# Patient Record
Sex: FEMALE | Race: WHITE | NOT HISPANIC OR LATINO | Employment: OTHER | URBAN - METROPOLITAN AREA
[De-identification: names, ages, dates, MRNs, and addresses within clinical notes are randomized per-mention and may not be internally consistent; named-entity substitution may affect disease eponyms.]

---

## 2017-01-20 ENCOUNTER — RECORDS - HEALTHEAST (OUTPATIENT)
Dept: GENERAL RADIOLOGY | Facility: CLINIC | Age: 57
End: 2017-01-20

## 2017-01-20 ENCOUNTER — OFFICE VISIT - HEALTHEAST (OUTPATIENT)
Dept: FAMILY MEDICINE | Facility: CLINIC | Age: 57
End: 2017-01-20

## 2017-01-20 DIAGNOSIS — R07.89 OTHER CHEST PAIN: ICD-10-CM
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
## 2017-01-20 DIAGNOSIS — R07.89 CHEST WALL PAIN: ICD-10-CM

## 2017-01-20 ASSESSMENT — MIFFLIN-ST. JEOR: SCORE: 1668.12

## 2017-01-21 ENCOUNTER — COMMUNICATION - HEALTHEAST (OUTPATIENT)
Dept: SCHEDULING | Facility: CLINIC | Age: 57
End: 2017-01-21

## 2017-06-12 ENCOUNTER — OFFICE VISIT - HEALTHEAST (OUTPATIENT)
Dept: FAMILY MEDICINE | Facility: CLINIC | Age: 57
End: 2017-06-12

## 2017-06-12 DIAGNOSIS — G47.33 OBSTRUCTIVE SLEEP APNEA (ADULT) (PEDIATRIC): ICD-10-CM

## 2017-06-12 DIAGNOSIS — E78.2 MIXED HYPERLIPIDEMIA: ICD-10-CM

## 2017-06-12 DIAGNOSIS — Z00.00 ROUTINE GENERAL MEDICAL EXAMINATION AT A HEALTH CARE FACILITY: ICD-10-CM

## 2017-06-12 DIAGNOSIS — M06.9 RHEUMATOID ARTHRITIS INVOLVING MULTIPLE SITES, UNSPECIFIED RHEUMATOID FACTOR PRESENCE: ICD-10-CM

## 2017-06-12 DIAGNOSIS — E66.9 OBESITY: ICD-10-CM

## 2017-06-12 DIAGNOSIS — I10 ESSENTIAL HYPERTENSION WITH GOAL BLOOD PRESSURE LESS THAN 140/90: ICD-10-CM

## 2017-06-12 DIAGNOSIS — F17.200 SMOKING: ICD-10-CM

## 2017-06-12 LAB
CHOLEST SERPL-MCNC: 217 MG/DL
FASTING STATUS PATIENT QL REPORTED: YES
HDLC SERPL-MCNC: 34 MG/DL
LDLC SERPL CALC-MCNC: 144 MG/DL
TRIGL SERPL-MCNC: 193 MG/DL

## 2017-06-12 ASSESSMENT — MIFFLIN-ST. JEOR: SCORE: 1624.35

## 2017-08-02 ENCOUNTER — OFFICE VISIT - HEALTHEAST (OUTPATIENT)
Dept: RHEUMATOLOGY | Facility: CLINIC | Age: 57
End: 2017-08-02

## 2017-08-02 ENCOUNTER — RECORDS - HEALTHEAST (OUTPATIENT)
Dept: GENERAL RADIOLOGY | Age: 57
End: 2017-08-02

## 2017-08-02 DIAGNOSIS — M19.049 CMC DJD(CARPOMETACARPAL DEGENERATIVE JOINT DISEASE), LOCALIZED PRIMARY, UNSPECIFIED LATERALITY: ICD-10-CM

## 2017-08-02 DIAGNOSIS — M15.0 PRIMARY GENERALIZED (OSTEO)ARTHRITIS: ICD-10-CM

## 2017-08-02 DIAGNOSIS — M25.50 POLYARTHRALGIA: ICD-10-CM

## 2017-08-02 DIAGNOSIS — M25.50 PAIN IN UNSPECIFIED JOINT: ICD-10-CM

## 2017-08-02 DIAGNOSIS — M15.0 PRIMARY OSTEOARTHRITIS INVOLVING MULTIPLE JOINTS: ICD-10-CM

## 2017-08-02 ASSESSMENT — MIFFLIN-ST. JEOR: SCORE: 1615.73

## 2017-09-13 ENCOUNTER — COMMUNICATION - HEALTHEAST (OUTPATIENT)
Dept: FAMILY MEDICINE | Facility: CLINIC | Age: 57
End: 2017-09-13

## 2017-09-13 DIAGNOSIS — Z91.09 OTHER ALLERGY, OTHER THAN TO MEDICINAL AGENTS: ICD-10-CM

## 2017-11-01 ENCOUNTER — OFFICE VISIT - HEALTHEAST (OUTPATIENT)
Dept: PODIATRY | Age: 57
End: 2017-11-01

## 2017-11-01 DIAGNOSIS — L60.0 INGROWN TOENAIL: ICD-10-CM

## 2017-11-01 ASSESSMENT — MIFFLIN-ST. JEOR: SCORE: 1614.82

## 2017-11-06 ENCOUNTER — OFFICE VISIT - HEALTHEAST (OUTPATIENT)
Dept: RHEUMATOLOGY | Facility: CLINIC | Age: 57
End: 2017-11-06

## 2017-11-06 DIAGNOSIS — M25.50 POLYARTHRALGIA: ICD-10-CM

## 2017-11-06 DIAGNOSIS — M47.816 LUMBAR SPONDYLOSIS: ICD-10-CM

## 2017-11-06 DIAGNOSIS — M19.032 LOCALIZED PRIMARY OSTEOARTHROSIS OF CARPOMETACARPAL JOINT OF LEFT WRIST: ICD-10-CM

## 2017-11-06 DIAGNOSIS — M15.0 PRIMARY OSTEOARTHRITIS INVOLVING MULTIPLE JOINTS: ICD-10-CM

## 2017-11-06 ASSESSMENT — MIFFLIN-ST. JEOR: SCORE: 1614.82

## 2018-01-08 ENCOUNTER — OFFICE VISIT - HEALTHEAST (OUTPATIENT)
Dept: FAMILY MEDICINE | Facility: CLINIC | Age: 58
End: 2018-01-08

## 2018-01-08 DIAGNOSIS — J01.00 ACUTE NON-RECURRENT MAXILLARY SINUSITIS: ICD-10-CM

## 2018-01-08 ASSESSMENT — MIFFLIN-ST. JEOR: SCORE: 1637.96

## 2018-06-12 ENCOUNTER — COMMUNICATION - HEALTHEAST (OUTPATIENT)
Dept: FAMILY MEDICINE | Facility: CLINIC | Age: 58
End: 2018-06-12

## 2018-06-14 ENCOUNTER — OFFICE VISIT - HEALTHEAST (OUTPATIENT)
Dept: FAMILY MEDICINE | Facility: CLINIC | Age: 58
End: 2018-06-14

## 2018-06-14 ENCOUNTER — COMMUNICATION - HEALTHEAST (OUTPATIENT)
Dept: FAMILY MEDICINE | Facility: CLINIC | Age: 58
End: 2018-06-14

## 2018-06-14 DIAGNOSIS — I10 ESSENTIAL HYPERTENSION: ICD-10-CM

## 2018-06-14 DIAGNOSIS — Z79.899 MEDICATION MANAGEMENT: ICD-10-CM

## 2018-06-14 DIAGNOSIS — F33.0 MILD EPISODE OF RECURRENT MAJOR DEPRESSIVE DISORDER (H): ICD-10-CM

## 2018-06-14 DIAGNOSIS — G47.33 OBSTRUCTIVE SLEEP APNEA: ICD-10-CM

## 2018-06-14 DIAGNOSIS — F17.200 SMOKING: ICD-10-CM

## 2018-06-14 DIAGNOSIS — E66.01 MORBID OBESITY (H): ICD-10-CM

## 2018-06-14 DIAGNOSIS — Z00.00 ROUTINE GENERAL MEDICAL EXAMINATION AT A HEALTH CARE FACILITY: ICD-10-CM

## 2018-06-14 DIAGNOSIS — E78.2 MIXED HYPERLIPIDEMIA: ICD-10-CM

## 2018-06-14 LAB
ANION GAP SERPL CALCULATED.3IONS-SCNC: 12 MMOL/L (ref 5–18)
BUN SERPL-MCNC: 12 MG/DL (ref 8–22)
CALCIUM SERPL-MCNC: 9.6 MG/DL (ref 8.5–10.5)
CHLORIDE BLD-SCNC: 109 MMOL/L (ref 98–107)
CHOLEST SERPL-MCNC: 247 MG/DL
CO2 SERPL-SCNC: 20 MMOL/L (ref 22–31)
CREAT SERPL-MCNC: 0.74 MG/DL (ref 0.6–1.1)
FASTING STATUS PATIENT QL REPORTED: YES
GFR SERPL CREATININE-BSD FRML MDRD: >60 ML/MIN/1.73M2
GLUCOSE BLD-MCNC: 107 MG/DL (ref 70–125)
HDLC SERPL-MCNC: 36 MG/DL
HGB BLD-MCNC: 15.6 G/DL (ref 12–16)
LDLC SERPL CALC-MCNC: 157 MG/DL
POTASSIUM BLD-SCNC: 4.4 MMOL/L (ref 3.5–5)
SODIUM SERPL-SCNC: 141 MMOL/L (ref 136–145)
TRIGL SERPL-MCNC: 271 MG/DL

## 2018-06-14 ASSESSMENT — MIFFLIN-ST. JEOR: SCORE: 1666.53

## 2018-06-19 ENCOUNTER — HOSPITAL ENCOUNTER (OUTPATIENT)
Dept: MAMMOGRAPHY | Facility: CLINIC | Age: 58
Discharge: HOME | End: 2018-06-19
Attending: NURSE PRACTITIONER

## 2018-06-19 DIAGNOSIS — Z12.31 VISIT FOR SCREENING MAMMOGRAM: ICD-10-CM

## 2018-06-22 ENCOUNTER — COMMUNICATION - HEALTHEAST (OUTPATIENT)
Dept: FAMILY MEDICINE | Facility: CLINIC | Age: 58
End: 2018-06-22

## 2018-07-11 ENCOUNTER — RECORDS - HEALTHEAST (OUTPATIENT)
Dept: ADMINISTRATIVE | Facility: OTHER | Age: 58
End: 2018-07-11

## 2018-07-17 ENCOUNTER — OFFICE VISIT - HEALTHEAST (OUTPATIENT)
Dept: FAMILY MEDICINE | Facility: CLINIC | Age: 58
End: 2018-07-17

## 2018-07-17 DIAGNOSIS — F33.0 MILD EPISODE OF RECURRENT MAJOR DEPRESSIVE DISORDER (H): ICD-10-CM

## 2018-07-17 ASSESSMENT — MIFFLIN-ST. JEOR: SCORE: 1640.22

## 2018-07-20 ENCOUNTER — RECORDS - HEALTHEAST (OUTPATIENT)
Dept: ADMINISTRATIVE | Facility: OTHER | Age: 58
End: 2018-07-20

## 2018-08-01 ENCOUNTER — RECORDS - HEALTHEAST (OUTPATIENT)
Dept: ADMINISTRATIVE | Facility: OTHER | Age: 58
End: 2018-08-01

## 2018-08-15 ENCOUNTER — COMMUNICATION - HEALTHEAST (OUTPATIENT)
Dept: FAMILY MEDICINE | Facility: CLINIC | Age: 58
End: 2018-08-15

## 2018-08-15 ENCOUNTER — RECORDS - HEALTHEAST (OUTPATIENT)
Dept: ADMINISTRATIVE | Facility: OTHER | Age: 58
End: 2018-08-15

## 2018-08-21 ENCOUNTER — RECORDS - HEALTHEAST (OUTPATIENT)
Dept: ADMINISTRATIVE | Facility: OTHER | Age: 58
End: 2018-08-21

## 2018-08-28 ENCOUNTER — COMMUNICATION - HEALTHEAST (OUTPATIENT)
Dept: FAMILY MEDICINE | Facility: CLINIC | Age: 58
End: 2018-08-28

## 2018-08-28 ENCOUNTER — AMBULATORY - HEALTHEAST (OUTPATIENT)
Dept: NURSING | Facility: CLINIC | Age: 58
End: 2018-08-28

## 2018-08-28 DIAGNOSIS — Z00.00 ROUTINE GENERAL MEDICAL EXAMINATION AT A HEALTH CARE FACILITY: ICD-10-CM

## 2019-02-13 ENCOUNTER — OFFICE VISIT - HEALTHEAST (OUTPATIENT)
Dept: FAMILY MEDICINE | Facility: CLINIC | Age: 59
End: 2019-02-13

## 2019-02-13 DIAGNOSIS — B86 SCABIES: ICD-10-CM

## 2019-02-13 DIAGNOSIS — R21 RASH AND NONSPECIFIC SKIN ERUPTION: ICD-10-CM

## 2019-02-22 ENCOUNTER — COMMUNICATION - HEALTHEAST (OUTPATIENT)
Dept: FAMILY MEDICINE | Facility: CLINIC | Age: 59
End: 2019-02-22

## 2019-06-21 ENCOUNTER — HOSPITAL ENCOUNTER (OUTPATIENT)
Dept: MAMMOGRAPHY | Facility: CLINIC | Age: 59
Discharge: HOME | End: 2019-06-21
Attending: NURSE PRACTITIONER

## 2019-06-21 DIAGNOSIS — Z12.31 VISIT FOR SCREENING MAMMOGRAM: ICD-10-CM

## 2019-07-11 ENCOUNTER — RECORDS - HEALTHEAST (OUTPATIENT)
Dept: GENERAL RADIOLOGY | Facility: CLINIC | Age: 59
End: 2019-07-11

## 2019-07-11 ENCOUNTER — COMMUNICATION - HEALTHEAST (OUTPATIENT)
Dept: FAMILY MEDICINE | Facility: CLINIC | Age: 59
End: 2019-07-11

## 2019-07-11 ENCOUNTER — AMBULATORY - HEALTHEAST (OUTPATIENT)
Dept: FAMILY MEDICINE | Facility: CLINIC | Age: 59
End: 2019-07-11

## 2019-07-11 ENCOUNTER — OFFICE VISIT - HEALTHEAST (OUTPATIENT)
Dept: FAMILY MEDICINE | Facility: CLINIC | Age: 59
End: 2019-07-11

## 2019-07-11 DIAGNOSIS — L02.92 BOIL: ICD-10-CM

## 2019-07-11 DIAGNOSIS — M79.675 PAIN OF TOE OF LEFT FOOT: ICD-10-CM

## 2019-07-11 DIAGNOSIS — F33.0 MILD EPISODE OF RECURRENT MAJOR DEPRESSIVE DISORDER (H): ICD-10-CM

## 2019-07-11 DIAGNOSIS — Z00.01 ENCOUNTER FOR GENERAL ADULT MEDICAL EXAMINATION WITH ABNORMAL FINDINGS: ICD-10-CM

## 2019-07-11 DIAGNOSIS — R05.9 COUGH: ICD-10-CM

## 2019-07-11 DIAGNOSIS — E78.2 MIXED HYPERLIPIDEMIA: ICD-10-CM

## 2019-07-11 DIAGNOSIS — E66.01 MORBID OBESITY (H): ICD-10-CM

## 2019-07-11 DIAGNOSIS — J20.9 ACUTE BRONCHITIS, UNSPECIFIED ORGANISM: ICD-10-CM

## 2019-07-11 DIAGNOSIS — R60.9 EDEMA, UNSPECIFIED TYPE: ICD-10-CM

## 2019-07-11 DIAGNOSIS — I10 HYPERTENSION: ICD-10-CM

## 2019-07-11 DIAGNOSIS — M47.816 LUMBAR SPONDYLOSIS: ICD-10-CM

## 2019-07-11 DIAGNOSIS — Z79.899 MEDICATION MANAGEMENT: ICD-10-CM

## 2019-07-11 LAB
ALBUMIN SERPL-MCNC: 3.9 G/DL (ref 3.5–5)
ALP SERPL-CCNC: 71 U/L (ref 45–120)
ALT SERPL W P-5'-P-CCNC: 23 U/L (ref 0–45)
ANION GAP SERPL CALCULATED.3IONS-SCNC: 7 MMOL/L (ref 5–18)
AST SERPL W P-5'-P-CCNC: 14 U/L (ref 0–40)
BILIRUB SERPL-MCNC: 0.3 MG/DL (ref 0–1)
BUN SERPL-MCNC: 9 MG/DL (ref 8–22)
CALCIUM SERPL-MCNC: 9.6 MG/DL (ref 8.5–10.5)
CHLORIDE BLD-SCNC: 107 MMOL/L (ref 98–107)
CHOLEST SERPL-MCNC: 207 MG/DL
CO2 SERPL-SCNC: 28 MMOL/L (ref 22–31)
CREAT SERPL-MCNC: 0.77 MG/DL (ref 0.6–1.1)
FASTING STATUS PATIENT QL REPORTED: YES
GFR SERPL CREATININE-BSD FRML MDRD: >60 ML/MIN/1.73M2
GLUCOSE BLD-MCNC: 88 MG/DL (ref 70–125)
HDLC SERPL-MCNC: 36 MG/DL
HGB BLD-MCNC: 15.6 G/DL (ref 12–16)
LDLC SERPL CALC-MCNC: 123 MG/DL
POTASSIUM BLD-SCNC: 4.3 MMOL/L (ref 3.5–5)
PROT SERPL-MCNC: 6.5 G/DL (ref 6–8)
SODIUM SERPL-SCNC: 142 MMOL/L (ref 136–145)
TRIGL SERPL-MCNC: 241 MG/DL
URATE SERPL-MCNC: 5.2 MG/DL (ref 2–7.5)

## 2019-07-11 ASSESSMENT — MIFFLIN-ST. JEOR: SCORE: 1625.59

## 2019-07-12 ENCOUNTER — COMMUNICATION - HEALTHEAST (OUTPATIENT)
Dept: FAMILY MEDICINE | Facility: CLINIC | Age: 59
End: 2019-07-12

## 2019-07-25 ENCOUNTER — AMBULATORY - HEALTHEAST (OUTPATIENT)
Dept: FAMILY MEDICINE | Facility: CLINIC | Age: 59
End: 2019-07-25

## 2019-07-25 ENCOUNTER — COMMUNICATION - HEALTHEAST (OUTPATIENT)
Dept: VASCULAR SURGERY | Facility: CLINIC | Age: 59
End: 2019-07-25

## 2019-07-25 DIAGNOSIS — R93.89 ABNORMAL CHEST X-RAY: ICD-10-CM

## 2019-07-26 ENCOUNTER — COMMUNICATION - HEALTHEAST (OUTPATIENT)
Dept: FAMILY MEDICINE | Facility: CLINIC | Age: 59
End: 2019-07-26

## 2019-08-05 ENCOUNTER — OFFICE VISIT - HEALTHEAST (OUTPATIENT)
Dept: FAMILY MEDICINE | Facility: CLINIC | Age: 59
End: 2019-08-05

## 2019-08-05 DIAGNOSIS — I10 HYPERTENSION: ICD-10-CM

## 2019-08-05 DIAGNOSIS — F33.0 MILD EPISODE OF RECURRENT MAJOR DEPRESSIVE DISORDER (H): ICD-10-CM

## 2019-08-05 DIAGNOSIS — E78.2 MIXED HYPERLIPIDEMIA: ICD-10-CM

## 2019-08-05 DIAGNOSIS — B35.1 ONYCHOMYCOSIS: ICD-10-CM

## 2019-08-05 DIAGNOSIS — R60.9 EDEMA, UNSPECIFIED TYPE: ICD-10-CM

## 2019-08-05 LAB
ANION GAP SERPL CALCULATED.3IONS-SCNC: 10 MMOL/L (ref 5–18)
BUN SERPL-MCNC: 14 MG/DL (ref 8–22)
CALCIUM SERPL-MCNC: 10 MG/DL (ref 8.5–10.5)
CHLORIDE BLD-SCNC: 103 MMOL/L (ref 98–107)
CO2 SERPL-SCNC: 27 MMOL/L (ref 22–31)
CREAT SERPL-MCNC: 0.74 MG/DL (ref 0.6–1.1)
GFR SERPL CREATININE-BSD FRML MDRD: >60 ML/MIN/1.73M2
GLUCOSE BLD-MCNC: 110 MG/DL (ref 70–125)
POTASSIUM BLD-SCNC: 4.1 MMOL/L (ref 3.5–5)
SODIUM SERPL-SCNC: 140 MMOL/L (ref 136–145)

## 2019-08-05 ASSESSMENT — MIFFLIN-ST. JEOR: SCORE: 1621.05

## 2019-08-09 ENCOUNTER — HOSPITAL ENCOUNTER (OUTPATIENT)
Dept: CT IMAGING | Facility: HOSPITAL | Age: 59
Discharge: HOME | End: 2019-08-09
Attending: NURSE PRACTITIONER

## 2019-08-09 DIAGNOSIS — R93.89 ABNORMAL CHEST X-RAY: ICD-10-CM

## 2019-08-09 ASSESSMENT — MIFFLIN-ST. JEOR: SCORE: 1625.02

## 2019-08-13 ENCOUNTER — OFFICE VISIT - HEALTHEAST (OUTPATIENT)
Dept: PODIATRY | Facility: CLINIC | Age: 59
End: 2019-08-13

## 2019-08-13 DIAGNOSIS — L60.0 INGROWN TOENAIL: ICD-10-CM

## 2019-08-13 DIAGNOSIS — L60.2 ONYCHAUXIS: ICD-10-CM

## 2019-08-13 DIAGNOSIS — B35.1 NAIL FUNGUS: ICD-10-CM

## 2019-08-13 ASSESSMENT — MIFFLIN-ST. JEOR: SCORE: 1625.02

## 2019-08-15 ENCOUNTER — COMMUNICATION - HEALTHEAST (OUTPATIENT)
Dept: FAMILY MEDICINE | Facility: CLINIC | Age: 59
End: 2019-08-15

## 2019-09-25 ENCOUNTER — COMMUNICATION - HEALTHEAST (OUTPATIENT)
Dept: FAMILY MEDICINE | Facility: CLINIC | Age: 59
End: 2019-09-25

## 2019-09-25 DIAGNOSIS — J20.9 ACUTE BRONCHITIS, UNSPECIFIED ORGANISM: ICD-10-CM

## 2019-09-30 ENCOUNTER — OFFICE VISIT - HEALTHEAST (OUTPATIENT)
Dept: VASCULAR SURGERY | Facility: CLINIC | Age: 59
End: 2019-09-30

## 2019-09-30 DIAGNOSIS — I87.2 VENOUS INSUFFICIENCY OF BOTH LOWER EXTREMITIES: ICD-10-CM

## 2019-09-30 DIAGNOSIS — I73.9 PAD (PERIPHERAL ARTERY DISEASE) (H): ICD-10-CM

## 2019-09-30 DIAGNOSIS — I89.0 LYMPHEDEMA OF BOTH LOWER EXTREMITIES: ICD-10-CM

## 2019-09-30 ASSESSMENT — MIFFLIN-ST. JEOR: SCORE: 1625.02

## 2019-10-02 ENCOUNTER — COMMUNICATION - HEALTHEAST (OUTPATIENT)
Dept: FAMILY MEDICINE | Facility: CLINIC | Age: 59
End: 2019-10-02

## 2019-10-02 DIAGNOSIS — I10 HYPERTENSION: ICD-10-CM

## 2019-10-02 DIAGNOSIS — E78.2 MIXED HYPERLIPIDEMIA: ICD-10-CM

## 2019-10-04 ENCOUNTER — OFFICE VISIT - HEALTHEAST (OUTPATIENT)
Dept: FAMILY MEDICINE | Facility: CLINIC | Age: 59
End: 2019-10-04

## 2019-10-04 DIAGNOSIS — E78.2 MIXED HYPERLIPIDEMIA: ICD-10-CM

## 2019-10-04 DIAGNOSIS — R60.9 EDEMA, UNSPECIFIED TYPE: ICD-10-CM

## 2019-10-04 DIAGNOSIS — F33.0 MILD EPISODE OF RECURRENT MAJOR DEPRESSIVE DISORDER (H): ICD-10-CM

## 2019-10-04 DIAGNOSIS — I10 ESSENTIAL HYPERTENSION: ICD-10-CM

## 2019-10-04 ASSESSMENT — MIFFLIN-ST. JEOR: SCORE: 1655.87

## 2019-10-04 ASSESSMENT — PATIENT HEALTH QUESTIONNAIRE - PHQ9: SUM OF ALL RESPONSES TO PHQ QUESTIONS 1-9: 3

## 2019-10-07 ENCOUNTER — RECORDS - HEALTHEAST (OUTPATIENT)
Dept: VASCULAR ULTRASOUND | Facility: CLINIC | Age: 59
End: 2019-10-07

## 2019-10-07 ENCOUNTER — OFFICE VISIT - HEALTHEAST (OUTPATIENT)
Dept: VASCULAR SURGERY | Facility: CLINIC | Age: 59
End: 2019-10-07

## 2019-10-07 DIAGNOSIS — I87.2 VENOUS INSUFFICIENCY OF BOTH LOWER EXTREMITIES: ICD-10-CM

## 2019-10-07 DIAGNOSIS — I73.9 PAD (PERIPHERAL ARTERY DISEASE) (H): ICD-10-CM

## 2019-10-07 DIAGNOSIS — I89.0 LYMPHEDEMA, NOT ELSEWHERE CLASSIFIED: ICD-10-CM

## 2019-10-07 DIAGNOSIS — I87.2 VENOUS INSUFFICIENCY (CHRONIC) (PERIPHERAL): ICD-10-CM

## 2019-10-07 DIAGNOSIS — I73.9 PERIPHERAL VASCULAR DISEASE, UNSPECIFIED (H): ICD-10-CM

## 2019-10-07 DIAGNOSIS — I89.0 LYMPHEDEMA OF BOTH LOWER EXTREMITIES: ICD-10-CM

## 2019-10-07 ASSESSMENT — MIFFLIN-ST. JEOR: SCORE: 1652.24

## 2019-10-08 ENCOUNTER — AMBULATORY - HEALTHEAST (OUTPATIENT)
Dept: LAB | Facility: CLINIC | Age: 59
End: 2019-10-08

## 2019-10-08 DIAGNOSIS — E78.2 MIXED HYPERLIPIDEMIA: ICD-10-CM

## 2019-10-08 LAB
CHOLEST SERPL-MCNC: 135 MG/DL
FASTING STATUS PATIENT QL REPORTED: YES
HDLC SERPL-MCNC: 37 MG/DL
LDLC SERPL CALC-MCNC: 55 MG/DL
TRIGL SERPL-MCNC: 214 MG/DL

## 2019-10-10 ENCOUNTER — AMBULATORY - HEALTHEAST (OUTPATIENT)
Dept: FAMILY MEDICINE | Facility: CLINIC | Age: 59
End: 2019-10-10

## 2019-10-10 DIAGNOSIS — E78.2 MIXED HYPERLIPIDEMIA: ICD-10-CM

## 2019-10-11 ENCOUNTER — COMMUNICATION - HEALTHEAST (OUTPATIENT)
Dept: FAMILY MEDICINE | Facility: CLINIC | Age: 59
End: 2019-10-11

## 2019-10-14 ENCOUNTER — COMMUNICATION - HEALTHEAST (OUTPATIENT)
Dept: VASCULAR SURGERY | Facility: CLINIC | Age: 59
End: 2019-10-14

## 2019-10-21 ENCOUNTER — COMMUNICATION - HEALTHEAST (OUTPATIENT)
Dept: VASCULAR SURGERY | Facility: CLINIC | Age: 59
End: 2019-10-21

## 2019-12-30 ENCOUNTER — COMMUNICATION - HEALTHEAST (OUTPATIENT)
Dept: FAMILY MEDICINE | Facility: CLINIC | Age: 59
End: 2019-12-30

## 2019-12-30 DIAGNOSIS — E78.2 MIXED HYPERLIPIDEMIA: ICD-10-CM

## 2020-01-13 ENCOUNTER — OFFICE VISIT - HEALTHEAST (OUTPATIENT)
Dept: VASCULAR SURGERY | Facility: CLINIC | Age: 60
End: 2020-01-13

## 2020-01-13 DIAGNOSIS — M79.89 SWELLING OF LIMB: ICD-10-CM

## 2020-01-13 ASSESSMENT — MIFFLIN-ST. JEOR: SCORE: 1652.24

## 2020-01-14 ENCOUNTER — OFFICE VISIT - HEALTHEAST (OUTPATIENT)
Dept: FAMILY MEDICINE | Facility: CLINIC | Age: 60
End: 2020-01-14

## 2020-01-14 DIAGNOSIS — F33.0 MILD EPISODE OF RECURRENT MAJOR DEPRESSIVE DISORDER (H): ICD-10-CM

## 2020-01-14 DIAGNOSIS — M47.816 LUMBAR SPONDYLOSIS: ICD-10-CM

## 2020-01-14 DIAGNOSIS — F17.200 SMOKING: ICD-10-CM

## 2020-01-14 DIAGNOSIS — I10 ESSENTIAL HYPERTENSION: ICD-10-CM

## 2020-01-14 DIAGNOSIS — E66.01 MORBID OBESITY (H): ICD-10-CM

## 2020-01-14 DIAGNOSIS — G47.33 OBSTRUCTIVE SLEEP APNEA: ICD-10-CM

## 2020-01-14 DIAGNOSIS — E78.2 MIXED HYPERLIPIDEMIA: ICD-10-CM

## 2020-01-14 LAB
CHOLEST SERPL-MCNC: 134 MG/DL
FASTING STATUS PATIENT QL REPORTED: YES
HDLC SERPL-MCNC: 35 MG/DL
LDLC SERPL CALC-MCNC: 58 MG/DL
TRIGL SERPL-MCNC: 204 MG/DL

## 2020-01-14 ASSESSMENT — MIFFLIN-ST. JEOR: SCORE: 1659.5

## 2020-01-14 ASSESSMENT — PATIENT HEALTH QUESTIONNAIRE - PHQ9: SUM OF ALL RESPONSES TO PHQ QUESTIONS 1-9: 4

## 2020-04-09 ENCOUNTER — COMMUNICATION - HEALTHEAST (OUTPATIENT)
Dept: SCHEDULING | Facility: CLINIC | Age: 60
End: 2020-04-09

## 2020-04-09 DIAGNOSIS — F33.0 MILD EPISODE OF RECURRENT MAJOR DEPRESSIVE DISORDER (H): ICD-10-CM

## 2020-06-25 ENCOUNTER — COMMUNICATION - HEALTHEAST (OUTPATIENT)
Dept: FAMILY MEDICINE | Facility: CLINIC | Age: 60
End: 2020-06-25

## 2020-06-25 DIAGNOSIS — M47.816 LUMBAR SPONDYLOSIS: ICD-10-CM

## 2020-06-26 ENCOUNTER — COMMUNICATION - HEALTHEAST (OUTPATIENT)
Dept: FAMILY MEDICINE | Facility: CLINIC | Age: 60
End: 2020-06-26

## 2020-06-26 DIAGNOSIS — F33.0 MILD EPISODE OF RECURRENT MAJOR DEPRESSIVE DISORDER (H): ICD-10-CM

## 2020-06-27 ENCOUNTER — COMMUNICATION - HEALTHEAST (OUTPATIENT)
Dept: FAMILY MEDICINE | Facility: CLINIC | Age: 60
End: 2020-06-27

## 2020-06-27 DIAGNOSIS — I10 HYPERTENSION: ICD-10-CM

## 2020-06-27 DIAGNOSIS — F33.0 MILD EPISODE OF RECURRENT MAJOR DEPRESSIVE DISORDER (H): ICD-10-CM

## 2020-07-10 ENCOUNTER — RECORDS - HEALTHEAST (OUTPATIENT)
Dept: ADMINISTRATIVE | Facility: OTHER | Age: 60
End: 2020-07-10

## 2020-07-10 LAB — OCCULT BLOOD (FIT)_EXT (HISTORICAL CONVERSION): NEGATIVE

## 2020-08-07 ENCOUNTER — COMMUNICATION - HEALTHEAST (OUTPATIENT)
Dept: FAMILY MEDICINE | Facility: CLINIC | Age: 60
End: 2020-08-07

## 2020-08-07 DIAGNOSIS — B35.1 ONYCHOMYCOSIS: ICD-10-CM

## 2020-08-25 ENCOUNTER — RECORDS - HEALTHEAST (OUTPATIENT)
Dept: HEALTH INFORMATION MANAGEMENT | Facility: CLINIC | Age: 60
End: 2020-08-25

## 2020-12-07 ENCOUNTER — COMMUNICATION - HEALTHEAST (OUTPATIENT)
Dept: PHARMACY | Facility: CLINIC | Age: 60
End: 2020-12-07

## 2020-12-07 DIAGNOSIS — I10 HYPERTENSION: ICD-10-CM

## 2020-12-08 ENCOUNTER — COMMUNICATION - HEALTHEAST (OUTPATIENT)
Dept: FAMILY MEDICINE | Facility: CLINIC | Age: 60
End: 2020-12-08

## 2020-12-08 DIAGNOSIS — I10 HYPERTENSION: ICD-10-CM

## 2020-12-14 ENCOUNTER — COMMUNICATION - HEALTHEAST (OUTPATIENT)
Dept: FAMILY MEDICINE | Facility: CLINIC | Age: 60
End: 2020-12-14

## 2021-01-08 ENCOUNTER — OFFICE VISIT - HEALTHEAST (OUTPATIENT)
Dept: FAMILY MEDICINE | Facility: CLINIC | Age: 61
End: 2021-01-08

## 2021-01-08 DIAGNOSIS — Z23 ENCOUNTER FOR IMMUNIZATION: ICD-10-CM

## 2021-01-08 DIAGNOSIS — Z79.899 MEDICATION MANAGEMENT: ICD-10-CM

## 2021-01-08 DIAGNOSIS — E66.01 MORBID OBESITY (H): ICD-10-CM

## 2021-01-08 DIAGNOSIS — Z00.00 ROUTINE GENERAL MEDICAL EXAMINATION AT A HEALTH CARE FACILITY: ICD-10-CM

## 2021-01-08 DIAGNOSIS — F33.0 MILD EPISODE OF RECURRENT MAJOR DEPRESSIVE DISORDER (H): ICD-10-CM

## 2021-01-08 DIAGNOSIS — G47.33 OBSTRUCTIVE SLEEP APNEA: ICD-10-CM

## 2021-01-08 DIAGNOSIS — I10 HYPERTENSION: ICD-10-CM

## 2021-01-08 DIAGNOSIS — I10 ESSENTIAL HYPERTENSION: ICD-10-CM

## 2021-01-08 DIAGNOSIS — E78.2 MIXED HYPERLIPIDEMIA: ICD-10-CM

## 2021-01-08 DIAGNOSIS — F17.200 SMOKING: ICD-10-CM

## 2021-01-08 LAB
ALBUMIN SERPL-MCNC: 4.2 G/DL (ref 3.5–5)
ALBUMIN UR-MCNC: NEGATIVE MG/DL
ALP SERPL-CCNC: 83 U/L (ref 45–120)
ALT SERPL W P-5'-P-CCNC: 35 U/L (ref 0–45)
ANION GAP SERPL CALCULATED.3IONS-SCNC: 12 MMOL/L (ref 5–18)
APPEARANCE UR: ABNORMAL
AST SERPL W P-5'-P-CCNC: 15 U/L (ref 0–40)
BACTERIA #/AREA URNS HPF: ABNORMAL HPF
BILIRUB SERPL-MCNC: 0.4 MG/DL (ref 0–1)
BILIRUB UR QL STRIP: NEGATIVE
BUN SERPL-MCNC: 13 MG/DL (ref 8–22)
CALCIUM SERPL-MCNC: 9.6 MG/DL (ref 8.5–10.5)
CHLORIDE BLD-SCNC: 103 MMOL/L (ref 98–107)
CHOLEST SERPL-MCNC: 166 MG/DL
CO2 SERPL-SCNC: 26 MMOL/L (ref 22–31)
COLOR UR AUTO: YELLOW
CREAT SERPL-MCNC: 0.76 MG/DL (ref 0.6–1.1)
ERYTHROCYTE [DISTWIDTH] IN BLOOD BY AUTOMATED COUNT: 11.9 % (ref 11–14.5)
FASTING STATUS PATIENT QL REPORTED: YES
GFR SERPL CREATININE-BSD FRML MDRD: >60 ML/MIN/1.73M2
GLUCOSE BLD-MCNC: 131 MG/DL (ref 70–125)
GLUCOSE UR STRIP-MCNC: NEGATIVE MG/DL
HCT VFR BLD AUTO: 49.3 % (ref 35–47)
HDLC SERPL-MCNC: 42 MG/DL
HGB BLD-MCNC: 16.4 G/DL (ref 12–16)
HGB UR QL STRIP: ABNORMAL
KETONES UR STRIP-MCNC: NEGATIVE MG/DL
LDLC SERPL CALC-MCNC: 86 MG/DL
LEUKOCYTE ESTERASE UR QL STRIP: NEGATIVE
MCH RBC QN AUTO: 32.4 PG (ref 27–34)
MCHC RBC AUTO-ENTMCNC: 33.4 G/DL (ref 32–36)
MCV RBC AUTO: 97 FL (ref 80–100)
MUCOUS THREADS #/AREA URNS LPF: ABNORMAL LPF
NITRATE UR QL: NEGATIVE
PH UR STRIP: 5.5 [PH] (ref 5–8)
PLATELET # BLD AUTO: 228 THOU/UL (ref 140–440)
PMV BLD AUTO: 7.8 FL (ref 7–10)
POTASSIUM BLD-SCNC: 4 MMOL/L (ref 3.5–5)
PROT SERPL-MCNC: 6.8 G/DL (ref 6–8)
RBC # BLD AUTO: 5.08 MILL/UL (ref 3.8–5.4)
RBC #/AREA URNS AUTO: ABNORMAL HPF
SODIUM SERPL-SCNC: 141 MMOL/L (ref 136–145)
SP GR UR STRIP: 1.02 (ref 1–1.03)
SQUAMOUS #/AREA URNS AUTO: ABNORMAL LPF
TRIGL SERPL-MCNC: 191 MG/DL
UROBILINOGEN UR STRIP-ACNC: ABNORMAL
WBC #/AREA URNS AUTO: ABNORMAL HPF
WBC: 7.4 THOU/UL (ref 4–11)

## 2021-01-08 RX ORDER — HYDROCHLOROTHIAZIDE 25 MG/1
25 TABLET ORAL DAILY
Qty: 90 TABLET | Refills: 2 | Status: SHIPPED | OUTPATIENT
Start: 2021-01-08 | End: 2021-11-22

## 2021-01-08 RX ORDER — FLUOXETINE 10 MG/1
10 CAPSULE ORAL DAILY
Qty: 90 CAPSULE | Refills: 2 | Status: SHIPPED | OUTPATIENT
Start: 2021-01-08 | End: 2021-11-22

## 2021-01-08 ASSESSMENT — PATIENT HEALTH QUESTIONNAIRE - PHQ9: SUM OF ALL RESPONSES TO PHQ QUESTIONS 1-9: 2

## 2021-01-08 ASSESSMENT — MIFFLIN-ST. JEOR: SCORE: 1700.7

## 2021-01-10 ENCOUNTER — AMBULATORY - HEALTHEAST (OUTPATIENT)
Dept: FAMILY MEDICINE | Facility: CLINIC | Age: 61
End: 2021-01-10

## 2021-01-10 DIAGNOSIS — D58.2 ELEVATED HEMOGLOBIN (H): ICD-10-CM

## 2021-01-10 DIAGNOSIS — E78.2 MIXED HYPERLIPIDEMIA: ICD-10-CM

## 2021-01-10 DIAGNOSIS — R73.01 IMPAIRED FASTING GLUCOSE: ICD-10-CM

## 2021-03-29 ENCOUNTER — COMMUNICATION - HEALTHEAST (OUTPATIENT)
Dept: FAMILY MEDICINE | Facility: CLINIC | Age: 61
End: 2021-03-29

## 2021-03-29 DIAGNOSIS — M47.816 LUMBAR SPONDYLOSIS: ICD-10-CM

## 2021-03-30 RX ORDER — IBUPROFEN 600 MG/1
600 TABLET, FILM COATED ORAL EVERY 6 HOURS PRN
Qty: 30 TABLET | Refills: 0 | Status: SHIPPED | OUTPATIENT
Start: 2021-03-30 | End: 2022-04-10

## 2021-04-04 ENCOUNTER — COMMUNICATION - HEALTHEAST (OUTPATIENT)
Dept: FAMILY MEDICINE | Facility: CLINIC | Age: 61
End: 2021-04-04

## 2021-04-04 DIAGNOSIS — E78.2 MIXED HYPERLIPIDEMIA: ICD-10-CM

## 2021-04-05 RX ORDER — ATORVASTATIN CALCIUM 40 MG/1
TABLET, FILM COATED ORAL
Qty: 90 TABLET | Refills: 3 | Status: SHIPPED | OUTPATIENT
Start: 2021-04-05 | End: 2021-11-22

## 2021-04-08 ENCOUNTER — HOSPITAL ENCOUNTER (OUTPATIENT)
Dept: MAMMOGRAPHY | Facility: CLINIC | Age: 61
Discharge: HOME | End: 2021-04-08
Attending: NURSE PRACTITIONER

## 2021-04-08 DIAGNOSIS — Z12.31 VISIT FOR SCREENING MAMMOGRAM: ICD-10-CM

## 2021-04-10 ENCOUNTER — AMBULATORY - HEALTHEAST (OUTPATIENT)
Dept: NURSING | Facility: CLINIC | Age: 61
End: 2021-04-10

## 2021-05-26 ASSESSMENT — PATIENT HEALTH QUESTIONNAIRE - PHQ9: SUM OF ALL RESPONSES TO PHQ QUESTIONS 1-9: 3

## 2021-05-27 ENCOUNTER — RECORDS - HEALTHEAST (OUTPATIENT)
Dept: ADMINISTRATIVE | Facility: CLINIC | Age: 61
End: 2021-05-27

## 2021-05-27 ASSESSMENT — PATIENT HEALTH QUESTIONNAIRE - PHQ9
SUM OF ALL RESPONSES TO PHQ QUESTIONS 1-9: 2
SUM OF ALL RESPONSES TO PHQ QUESTIONS 1-9: 4

## 2021-05-28 ENCOUNTER — RECORDS - HEALTHEAST (OUTPATIENT)
Dept: ADMINISTRATIVE | Facility: CLINIC | Age: 61
End: 2021-05-28

## 2021-05-30 ENCOUNTER — RECORDS - HEALTHEAST (OUTPATIENT)
Dept: ADMINISTRATIVE | Facility: CLINIC | Age: 61
End: 2021-05-30

## 2021-05-30 VITALS — HEIGHT: 65 IN | WEIGHT: 243 LBS | BODY MASS INDEX: 40.48 KG/M2

## 2021-05-30 NOTE — TELEPHONE ENCOUNTER
----- Message from Leonora Dolan CNP sent at 7/25/2019  9:52 PM CDT -----  Please notify the patient that her chest x-ray does not show pneumonia.  The radiologist did incidentally note a possible aortic aneurysm which is an abnormal bulge within the wall of the major blood vessel in her heart.  This generally does not have symptoms and needs to be monitored.  I placed an order for a chest CT to confirm whether this is actually present.  Thanks.

## 2021-05-30 NOTE — TELEPHONE ENCOUNTER
----- Message from Leonora Dolan CNP sent at 7/11/2019  8:07 PM CDT -----  Please notify the patient that her labs are normal except her cholesterol is elevated, especially her triglycerides.  I sent a prescription for her to restart the Atorvastatin 20 mg daily. I encourage her to consume a healthy diet, exercise, and avoid alcohol.  I recommend a lab recheck in 3 months.  Thanks.

## 2021-05-30 NOTE — TELEPHONE ENCOUNTER
Reason contacted:  Results   Information relayed:  Below message   Additional questions:  No  Further follow-up needed:  No  Okay to leave a detailed message:  No

## 2021-05-30 NOTE — PROGRESS NOTES
Assessment and Plan:       1. Encounter for general adult medical examination with abnormal findings  Discussed consuming a healthy diet and exercising.  She is up-to-date on vaccinations.    - Hemoglobin    2. Hypertension  This has been suboptimal without medication.  Due to lower extremity edema, will restart hydrochlorothiazide.  She will follow-up in 2 weeks for blood pressure and BMP recheck.   - hydroCHLOROthiazide (HYDRODIURIL) 25 MG tablet; Take 1 tablet (25 mg total) by mouth daily.  Dispense: 90 tablet; Refill: 0    3. Mixed hyperlipidemia  She stopped her statin on her own.  Will check lipid cascade.  I anticipate restarting this due to history of hyperlipidemia and smoking.   - Lipid Cascade    4. Mild episode of recurrent major depressive disorder (H)  She states this is controlled.  She continues Fluoxetine.     5. Morbid Obesity  The following high BMI interventions were performed this visit: encouragement to exercise and lifestyle education regarding diet    6. Medication management  - Comprehensive Metabolic Panel    7. Acute bronchitis, unspecified organism  Chest x-ray shows no acute infiltrate.  Will have radiology review.  Due to abnormal lung sounds with auscultation, will treat with Doxycyline. Educated on its indications and side effects. She is instructed to avoid sun exposure.  Also provided prescription for Ventolin to use as needed.   - doxycycline (VIBRA-TABS) 100 MG tablet; Take 1 tablet (100 mg total) by mouth 2 (two) times a day for 10 days.  Dispense: 20 tablet; Refill: 0  - albuterol (PROAIR HFA;PROVENTIL HFA;VENTOLIN HFA) 90 mcg/actuation inhaler; Inhale 2 puffs every 4 (four) hours as needed for wheezing.  Dispense: 1 each; Refill: 0    8. Cough  - XR Chest 2 Views; Future    9. Lumbar spondylosis  Patient will take Ibuprofen as needed for flares of back pain.  I encouraged her not to take this regularly.   - ibuprofen (ADVIL,MOTRIN) 600 MG tablet; Take 1 tablet (600 mg total)  by mouth every 6 (six) hours as needed for pain.  Dispense: 30 tablet; Refill: 0    10. Edema, unspecified type  Will start Hydrochlorothiazide.  Educated on its indications and side effects. Will also refer to vascular center for possible venous insufficiency or PVD.    - Ambulatory referral to Vascular Medicine    11. Pain of toe of left foot  Will rule out gout. She denies any known trauma.  If uric acid is abnormal, may consider referral to podiatry.    - Uric Acid    12.  Boil  Patient's lump within her right axilla appears to be a boil.  Lymphadenopathy is another differential.  Discussed applying warm compresses.  Will treat with Doxycycline.  She is to follow-up if symptoms persist or worsen.       The patient's current medical problems were reviewed.    The following health maintenance schedule was reviewed with the patient and provided in printed form in the after visit summary:   Health Maintenance   Topic Date Due     ADVANCE DIRECTIVES DISCUSSED WITH PATIENT  06/05/2011     INFLUENZA VACCINE RULE BASED (1) 08/01/2019     DEPRESSION FOLLOW UP  01/11/2020     PAP SMEAR  06/06/2021     MAMMOGRAM  06/21/2021     COLONOSCOPY  09/13/2022     TD 18+ HE  06/06/2026     TDAP ADULT ONE TIME DOSE  Completed        Subjective:   Chief Complaint: Rochelle Garza is an 58 y.o. female here for an Annual Wellness visit.   HPI: Patient is single and is not sexually active.  She has no concerns for STDs.  Last Pap smear was on 6/6/2016 and was normal.  Patient has difficulty tolerating Pap smears.  Patient has multiple comorbidities including depression, obesity, NADIA, hypertension, hyperlipidemia, lumbar spondylosis.  Patient discontinued her hydrochlorothiazide as her blood pressure has been well controlled.  Over the past few months, though, she has been experiencing bilateral lower extremity swelling.  This is occurring daily for her.  She denies any recent travel.  She denies any personal family history of blood clots.   Patient has had intermittent toe pain throughout the winter.  It varies on which toe is being affected.  Over the past week, she has had a second and third toe pain.  She has noticed some bruising of her toes.  She denies any trauma. She denies calf pain. Patient has a history of hyperlipidemia.  She discontinued atorvastatin on her own.  Last cholesterol check was on 6/14/2018 with a total cholesterol 247, triglycerides 271, HDL 36, .  She is not consuming a healthy diet or exercising.    Patient is concerned of cold symptoms for 1 week.  She complains of rhinorrhea, postnasal drainage, occasionally productive cough with clear drainage.  She will occasionally vomit due to the cough.  She denies headache, stomachache, nausea, vomiting, fever, chest tightness, wheezing.  She has had some mild shortness of breath.  She has been using over-the-counter cough syrup and cough drops.  No one else around her is ill.    Patient has had a small sore present within her right axilla for 1 month.  It has not changed in size.  She has not noticed any drainage.  Patient states it is painful to touch.  She has a history of boils in the past.  No fever has been present.    She requests Ibuprofen to take as needed for chronic lumbar pain.      Review of Systems:  Please see above.  The rest of the review of systems are negative for all systems.    Patient Care Team:  Leonora Dolan CNP as PCP - General     Patient Active Problem List   Diagnosis     Osteoarthrosis     Rosacea     Insomnia     Edema     Allergies     Morbid Obesity     Major Depression, Recurrent     Obstructive sleep apnea     Hypertension     Diverticulitis Of Colon     Abnormal involuntary movement     Mixed hyperlipidemia     Polyarthralgia     Primary osteoarthritis involving multiple joints     CMC DJD(carpometacarpal degenerative joint disease), localized primary     Lumbar spondylosis     Past Medical History:   Diagnosis Date     Anxiety       Depression      Hyperlipidemia      Hypertension      Ingrown toenail      Onychomycosis      Painful swelling of joint      Rheumatoid arthritis (H)      Sleep apnea     Using CPAP machine      Past Surgical History:   Procedure Laterality Date     ANKLE SURGERY Right      HERNIA REPAIR       LUMBAR FUSION  2006    Done at [facility] APPENDECTOMY      Description: Appendectomy;  Recorded: 09/23/2008;     SPINAL FUSION        Family History   Problem Relation Age of Onset     Breast cancer Mother 49     Hypertension Father      Dementia Father      No Medical Problems Sister      No Medical Problems Daughter      No Medical Problems Maternal Grandmother      No Medical Problems Maternal Grandfather      Diabetes Paternal Grandmother      No Medical Problems Paternal Grandfather      No Medical Problems Maternal Aunt      No Medical Problems Paternal Aunt      BRCA 1/2 Neg Hx      Colon cancer Neg Hx      Endometrial cancer Neg Hx      Ovarian cancer Neg Hx       Social History     Socioeconomic History     Marital status: Single     Spouse name: Not on file     Number of children: Not on file     Years of education: Not on file     Highest education level: Not on file   Occupational History     Not on file   Social Needs     Financial resource strain: Not on file     Food insecurity:     Worry: Not on file     Inability: Not on file     Transportation needs:     Medical: Not on file     Non-medical: Not on file   Tobacco Use     Smoking status: Current Every Day Smoker     Packs/day: 0.50     Smokeless tobacco: Never Used   Substance and Sexual Activity     Alcohol use: No     Drug use: No     Sexual activity: Never   Lifestyle     Physical activity:     Days per week: Not on file     Minutes per session: Not on file     Stress: Not on file   Relationships     Social connections:     Talks on phone: Not on file     Gets together: Not on file     Attends Anglican service: Not on file     Active member of club  "or organization: Not on file     Attends meetings of clubs or organizations: Not on file     Relationship status: Not on file     Intimate partner violence:     Fear of current or ex partner: Not on file     Emotionally abused: Not on file     Physically abused: Not on file     Forced sexual activity: Not on file   Other Topics Concern     Not on file   Social History Narrative     Not on file      Current Outpatient Medications   Medication Sig Dispense Refill     FLUoxetine (PROZAC) 10 MG tablet Take 1 tablet (10 mg total) by mouth daily. 90 tablet 2     fluticasone (FLONASE) 50 mcg/actuation nasal spray SHAKE WELL AND USE 2 SPRAYS IN EACH NOSTRIL DAILY FOR 10 DAYS 48 g 3     albuterol (PROAIR HFA;PROVENTIL HFA;VENTOLIN HFA) 90 mcg/actuation inhaler Inhale 2 puffs every 4 (four) hours as needed for wheezing. 1 each 0     doxycycline (VIBRA-TABS) 100 MG tablet Take 1 tablet (100 mg total) by mouth 2 (two) times a day for 10 days. 20 tablet 0     hydroCHLOROthiazide (HYDRODIURIL) 25 MG tablet Take 1 tablet (25 mg total) by mouth daily. 90 tablet 0     ibuprofen (ADVIL,MOTRIN) 600 MG tablet Take 1 tablet (600 mg total) by mouth every 6 (six) hours as needed for pain. 30 tablet 0     No current facility-administered medications for this visit.       Objective:   Vital Signs:   Visit Vitals  /88   Pulse 82   Temp 98.3  F (36.8  C)   Ht 5' 3.75\" (1.619 m)   Wt (!) 238 lb (108 kg)   SpO2 98%   BMI 41.17 kg/m         VisionScreening:  No exam data present     PHYSICAL EXAM  /88   Pulse 82   Temp 98.3  F (36.8  C)   Ht 5' 3.75\" (1.619 m)   Wt (!) 238 lb (108 kg)   SpO2 98%   BMI 41.17 kg/m      General Appearance:    Alert, cooperative, no distress, appears stated age   Head:    Normocephalic, without obvious abnormality, atraumatic   Eyes:    PERRL, EOM's intact, both eyes   Ears:    Normal TM's and external ear canals, both ears   Nose:   Nares normal, septum midline, mucosa red, no drainage     or " sinus tenderness   Throat:   Lips, mucosa, and tongue normal; teeth and gums normal   Neck:   Supple, symmetrical, trachea midline, no adenopathy;     thyroid:  no enlargement/tenderness/nodules; no carotid    bruit or JVD   Back:     Symmetric, no curvature, ROM normal, no CVA tenderness   Lungs:     Slight expiratory rhonchi heard in right lower lung, respirations unlabored, no wheezing or rales noted.    Chest Wall:    No tenderness or deformity    Heart:    Regular rate and rhythm, S1 and S2 normal, no murmur, rub    or gallop   Breast Exam:    No tenderness, masses, or nipple abnormality   Abdomen:     Soft, non-tender, bowel sounds active all four quadrants,     no masses, no organomegaly   Genitalia:    deferred   Rectal:    deferred   Extremities:   Extremities normal, atraumatic, +1 nonpitting edema present in bilateral lower extremities.  The toes of both feet are warm to touch yet appear mildly cyanotic.  Pedal pulses present and palpable.  She is non-tender to palpation of the toes.    Pulses:   2+ and symmetric all extremities   Skin:   She has a 1/2 cm palpable lump present within the right axilla.  It is mildly erythematous and tender to touch.    Lymph nodes:   Cervical, supraclavicular, and axillary nodes normal   Neurologic:   CNII-XII intact, normal strength, sensation and reflexes     Throughout     I ordered and personally revtiewed a chest x-ray showing no obvious infiltrate.  Will have radiology review.        Assessment Results 7/11/2019   Activities of Daily Living No help needed   Instrumental Activities of Daily Living No help needed   Get Up and Go Score Less than 12 seconds   Mini Cog Total Score 5   Some recent data might be hidden     A Mini-Cog score of 0-2 suggests the possibility of dementia, score of 3-5 suggests no dementia    Identified Health Risks:     The patient was provided with suggestions to help her develop a healthy physical lifestyle.   She is at risk for lack of  exercise and has been provided with information to increase physical activity for the benefit of her well-being.  The patient was provided with written information regarding signs of hearing loss.  Information on urinary incontinence and treatment options given to patient.  Information regarding advance directives (living hui), including where she can download the appropriate form, was provided to the patient via the AVS.

## 2021-05-31 ENCOUNTER — RECORDS - HEALTHEAST (OUTPATIENT)
Dept: ADMINISTRATIVE | Facility: CLINIC | Age: 61
End: 2021-05-31

## 2021-05-31 VITALS — WEIGHT: 242.1 LBS | HEIGHT: 63 IN | BODY MASS INDEX: 42.89 KG/M2

## 2021-05-31 VITALS — HEIGHT: 63 IN | BODY MASS INDEX: 42.52 KG/M2 | WEIGHT: 240 LBS

## 2021-05-31 VITALS — BODY MASS INDEX: 43.43 KG/M2 | WEIGHT: 245.1 LBS | HEIGHT: 63 IN

## 2021-05-31 VITALS — HEIGHT: 63 IN | WEIGHT: 240.2 LBS | BODY MASS INDEX: 42.56 KG/M2

## 2021-05-31 NOTE — TELEPHONE ENCOUNTER
----- Message from Leonora Dolan CNP sent at 8/14/2019  5:17 PM CDT -----  Please notify the patient that her chest CT did not show an aneurysm.  It did show multiple small lung nodules.  They do not appear concerning at this time, but the radiologist recommends repeating the CT scan in one year. I encourage her to quit smoking.  Thanks.

## 2021-05-31 NOTE — PROGRESS NOTES
FOOT AND ANKLE SURGERY/PODIATRY Progress Note        ASSESSMENT:   Onychomycosis, onychauxis, onychocryptosis    HPI: Rochelle Garza was seen again today complaining of long thick painful nails both feet.  She stated that the second toenail left foot is quite painful.  She has a sharp pain which is aggravated by her shoe gear.  She does not recall any particular trauma to the toenail.  She has not had any associated redness or swelling.  The pain is moderate to severe.  There are no factors which give her any relief.    Past Medical History:   Diagnosis Date     Anxiety      Depression      Hyperlipidemia      Hypertension      Ingrown toenail      Onychomycosis      Painful swelling of joint      Rheumatoid arthritis (H)      Sleep apnea     Using CPAP machine       Past Surgical History:   Procedure Laterality Date     ANKLE SURGERY Right      HERNIA REPAIR       LUMBAR FUSION  2006    Done at [facility] APPENDECTOMY      Description: Appendectomy;  Recorded: 09/23/2008;     SPINAL FUSION         Allergies   Allergen Reactions     Penicillins Unknown     Sulfites          Current Outpatient Medications:      albuterol (PROAIR HFA;PROVENTIL HFA;VENTOLIN HFA) 90 mcg/actuation inhaler, INHALE 2 PUFF EVERY 4 HOURS AS NEEDED, Disp: 90 g, Rfl: 0     atorvastatin (LIPITOR) 20 MG tablet, Take 1 tablet (20 mg total) by mouth at bedtime., Disp: 90 tablet, Rfl: 0     ciclopirox (PENLAC) 8 % solution, Apply over nail and surrounding skin. Apply daily over previous coat. After seven (7) days, may remove with alcohol and continue cycle., Disp: 6.6 mL, Rfl: 3     FLUoxetine (PROZAC) 10 MG tablet, Take 1 tablet (10 mg total) by mouth daily., Disp: 90 tablet, Rfl: 2     fluticasone (FLONASE) 50 mcg/actuation nasal spray, SHAKE WELL AND USE 2 SPRAYS IN EACH NOSTRIL DAILY FOR 10 DAYS, Disp: 48 g, Rfl: 3     hydroCHLOROthiazide (HYDRODIURIL) 25 MG tablet, Take 1 tablet (25 mg total) by mouth daily., Disp: 90 tablet, Rfl: 2      ibuprofen (ADVIL,MOTRIN) 600 MG tablet, Take 1 tablet (600 mg total) by mouth every 6 (six) hours as needed for pain., Disp: 30 tablet, Rfl: 0    Family History   Problem Relation Age of Onset     Breast cancer Mother 49     Hypertension Father      Dementia Father      No Medical Problems Sister      No Medical Problems Daughter      No Medical Problems Maternal Grandmother      No Medical Problems Maternal Grandfather      Diabetes Paternal Grandmother      No Medical Problems Paternal Grandfather      No Medical Problems Maternal Aunt      No Medical Problems Paternal Aunt      BRCA 1/2 Neg Hx      Colon cancer Neg Hx      Endometrial cancer Neg Hx      Ovarian cancer Neg Hx        Social History     Socioeconomic History     Marital status: Single     Spouse name: Not on file     Number of children: Not on file     Years of education: Not on file     Highest education level: Not on file   Occupational History     Not on file   Social Needs     Financial resource strain: Not on file     Food insecurity:     Worry: Not on file     Inability: Not on file     Transportation needs:     Medical: Not on file     Non-medical: Not on file   Tobacco Use     Smoking status: Current Every Day Smoker     Packs/day: 0.50     Smokeless tobacco: Never Used   Substance and Sexual Activity     Alcohol use: No     Drug use: No     Sexual activity: Never   Lifestyle     Physical activity:     Days per week: Not on file     Minutes per session: Not on file     Stress: Not on file   Relationships     Social connections:     Talks on phone: Not on file     Gets together: Not on file     Attends Judaism service: Not on file     Active member of club or organization: Not on file     Attends meetings of clubs or organizations: Not on file     Relationship status: Not on file     Intimate partner violence:     Fear of current or ex partner: Not on file     Emotionally abused: Not on file     Physically abused: Not on file     Forced  sexual activity: Not on file   Other Topics Concern     Not on file   Social History Narrative     Not on file       10 point Review of Systems is negative        Vitals:    08/13/19 1012   BP: 120/80   Pulse: 72   Resp: 20   Temp: 98.8  F (37.1  C)       BMI= Body mass index is 40.68 kg/m .    OBJECTIVE:  General appearance: Patient is alert and fully cooperative with history & exam.  No sign of distress is noted during the visit.  Vascular: Dorsalis pedis and posterior tibial pulses are palpable. There is no pedal hair growth bilaterally.  CFT < 3 sec from anterior tibial surface to distal digits bilaterally. There is no appreciable edema noted.  Dermatologic: Nails on the left foot are elongated and 2-3 times normal thickness.  The lateral border of the second toenail is incurvated.  There is no edema, erythema surrounding any of the nails left foot.  Turgor and texture are within normal limits. No coloration or temperature changes. No primary or secondary lesions noted.  Neurologic: All epicritic and proprioceptive sensations are grossly intact bilaterally.  Musculoskeletal: All active and passive ankle, subtalar, midtarsal, and 1st MPJ range of motion are grossly intact without pain or crepitus, with the exception of none. Manual muscle strength is within normal limits bilaterally. All dorsiflexors, plantarflexors, invertors, evertors are intact bilaterally. Tenderness present to second toenail left foot on palpation.  No tenderness to left foot or ankle with range of motion. Calf is soft/non-tenderwithout warmth/induration    Imaging:     Ct Chest With Contrast    Result Date: 8/9/2019  EXAM: CT CHEST W CONTRAST LOCATION: [facility] DATE/TIME: 8/9/2019 11:05 AM INDICATION: Right parahilar enlargement. Aortic disease, nontraumatic abnormal chest x-ray showing possible ascending aortic aneurysm. COMPARISON: Chest radiograph 07/11/2019. TECHNIQUE: Helical images were obtained through the chest during  injection of IV contrast. Multiplanar reformats were obtained. Dose reduction techniques were used. CONTRAST: 90 ml omni 350. FINDINGS: LUNGS AND PLEURA: Right basilar atelectasis. Mild basilar bronchiectasis. Mosaicism which can be seen with small airways disease and/or gas trapping. 2 mm left apex nodule (series 5, image 26). 1 mm subpleural left upper lobe nodule (image 70). Negative pleural spaces. MEDIASTINUM: Nonaneurysmal aorta. Ascending aorta measures 3.8 cm. Patent aortic branch vessels. Borderline pulmonary trunk enlargement at 3 cm. No adenopathy. Small hiatus hernia. LIMITED UPPER ABDOMEN: Elevated right hemidiaphragm. 2 x 2.1 cm presumed left adrenal adenoma. MUSCULOSKELETAL: Negative.     CONCLUSION: 1.  Nonaneurysmal aorta. No dissection. 2.  No mediastinal or hilar adenopathy. 3.  Mild basilar predominant bronchiectasis. 4.  Couple tiny pulmonary nodules which may be incidental. Optional 12 month follow-up chest CT could be performed per guidelines below. REFERENCE: Guidelines for Management of Incidental Pulmonary Nodules Detected on CT Images: From the Fleischner Society 2017. Guidelines apply to incidental nodules in patients who are 35 years or older. Guidelines do not apply to lung cancer screening, patients with immunosuppression, or patients with known primary cancer. MULTIPLE NODULES Nodule size less than or equal to 6 mm Low-risk patients: No follow-up needed. High-risk patients: Optional follow-up at 12 months. Nodule size 6 mm or larger Low-risk patients: Follow-up CT at 3-6 months, then consider CT at 18-24 months. High-risk patients: Follow-up CT at 3-6 months, then at 18-24 months if no change. -Use most suspicious nodule as guide to management.            TREATMENT:  Debrided nails 1 through 5 left foot.  I informed the patient that this should give her some relief.  If she continues to have pain I would recommend total nail excision and matrixectomy of the second toenail left  foot.        Neftali Troy; NONA  [facility] Foot & Ankle Surgery/Podiatry

## 2021-05-31 NOTE — PROGRESS NOTES
Assessment and Plan:     1. Onychomycosis  We will treat with Penlac, use as directed.  Educated on its indications and side effects.  Will refer to podiatry due to discomfort within the toe.  - ciclopirox (PENLAC) 8 % solution; Apply over nail and surrounding skin. Apply daily over previous coat. After seven (7) days, may remove with alcohol and continue cycle.  Dispense: 6.6 mL; Refill: 3  - Ambulatory referral to Podiatry    2. Mild episode of recurrent major depressive disorder (H)  She continues fluoxetine 10 mg daily.  This is stable.  - FLUoxetine (PROZAC) 10 MG tablet; Take 1 tablet (10 mg total) by mouth daily.  Dispense: 90 tablet; Refill: 2    3. Hypertension  This is controlled with hydrochlorothiazide.  We will recheck BMP today.  - hydroCHLOROthiazide (HYDRODIURIL) 25 MG tablet; Take 1 tablet (25 mg total) by mouth daily.  Dispense: 90 tablet; Refill: 2  - Basic Metabolic Panel    4. Edema, unspecified type  She has found significant improvement with the edema.  She continues hydrochlorothiazide.    5. Mixed hyperlipidemia  She continues atorvastatin.  She will follow-up for lab only recheck in 3 months.  She is content with the plan.      Subjective:     Rochelle is a 59 y.o. female presenting to the clinic for for follow-up on edema.  Patient presented to the clinic for a physical on 7/11/2019 with concerns of lower extremity edema.  Blood pressure was suboptimal.  We restarted hydrochlorothiazide 25 mg daily.  Patient has been tolerating the medication well without any side effects.  She has not been checking her blood pressure at home.  The edema has significantly improved.  She denies chest pain, shortness of breath with exertion, orthopnea, syncope.  Patient has hyperlipidemia.  She recently restarted her atorvastatin 20 mg daily.  She is taking fluoxetine 10 mg daily for depression.  She feels as though this is well controlled.  She denies thoughts of suicide.  Patient has had thickened toenails for  multiple years.  She is now having discomfort within her left foot second toenail.  She has not tried any over-the-counter products for her symptoms.  Patient had a chest x-ray on 7/11/2019 showing stable prominence of the right hilum, possibly ascending aortic aneurysm.  Patient has a CT of the chest scheduled for Friday.    Review of Systems: A complete 14 point review of systems was obtained and is negative or as stated in the history of present illness.    Social History     Socioeconomic History     Marital status: Single     Spouse name: Not on file     Number of children: Not on file     Years of education: Not on file     Highest education level: Not on file   Occupational History     Not on file   Social Needs     Financial resource strain: Not on file     Food insecurity:     Worry: Not on file     Inability: Not on file     Transportation needs:     Medical: Not on file     Non-medical: Not on file   Tobacco Use     Smoking status: Current Every Day Smoker     Packs/day: 0.50     Smokeless tobacco: Never Used   Substance and Sexual Activity     Alcohol use: No     Drug use: No     Sexual activity: Never   Lifestyle     Physical activity:     Days per week: Not on file     Minutes per session: Not on file     Stress: Not on file   Relationships     Social connections:     Talks on phone: Not on file     Gets together: Not on file     Attends Zoroastrian service: Not on file     Active member of club or organization: Not on file     Attends meetings of clubs or organizations: Not on file     Relationship status: Not on file     Intimate partner violence:     Fear of current or ex partner: Not on file     Emotionally abused: Not on file     Physically abused: Not on file     Forced sexual activity: Not on file   Other Topics Concern     Not on file   Social History Narrative     Not on file       Active Ambulatory Problems     Diagnosis Date Noted     Osteoarthrosis      Rosacea      Insomnia      Edema       "Allergies      Morbid Obesity      Major Depression, Recurrent      Obstructive sleep apnea      Hypertension      Diverticulitis Of Colon      Abnormal involuntary movement      Mixed hyperlipidemia 06/12/2017     Polyarthralgia 08/02/2017     Primary osteoarthritis involving multiple joints 08/02/2017     CMC DJD(carpometacarpal degenerative joint disease), localized primary 08/02/2017     Lumbar spondylosis 11/06/2017     Resolved Ambulatory Problems     Diagnosis Date Noted     Dyspepsia      Hyperlipidemia      Rheumatoid arthritis (H) 06/06/2016     Past Medical History:   Diagnosis Date     Anxiety      Depression      Hyperlipidemia      Hypertension      Ingrown toenail      Onychomycosis      Painful swelling of joint      Rheumatoid arthritis (H)      Sleep apnea        Family History   Problem Relation Age of Onset     Breast cancer Mother 49     Hypertension Father      Dementia Father      No Medical Problems Sister      No Medical Problems Daughter      No Medical Problems Maternal Grandmother      No Medical Problems Maternal Grandfather      Diabetes Paternal Grandmother      No Medical Problems Paternal Grandfather      No Medical Problems Maternal Aunt      No Medical Problems Paternal Aunt      BRCA 1/2 Neg Hx      Colon cancer Neg Hx      Endometrial cancer Neg Hx      Ovarian cancer Neg Hx        Objective:     /80   Pulse 67   Ht 5' 3.75\" (1.619 m)   Wt (!) 237 lb (107.5 kg)   SpO2 97%   BMI 41.00 kg/m      Patient is alert, in no obvious distress.   Skin: Warm, dry.  No lesions or rashes.  Skin turgor rapid return.   HEENT:  Head normocephalic, atraumatic.  Eyes normal.  Ears normal.  Nose patent, mucosa pink.  Oropharynx mucosa pink.  No lesions or tonsillar enlargement.   Neck: Supple, no lymphadenopathy.  Lungs:  Clear to auscultation. Respirations even and unlabored.  No wheezing or rales noted.   Heart:  Regular rate and rhythm.  No murmurs.   Musculoskeletal:  No edema " present in bilateral lower extremities.

## 2021-06-01 ENCOUNTER — RECORDS - HEALTHEAST (OUTPATIENT)
Dept: ADMINISTRATIVE | Facility: CLINIC | Age: 61
End: 2021-06-01

## 2021-06-01 VITALS — BODY MASS INDEX: 43.52 KG/M2 | WEIGHT: 245.6 LBS | HEIGHT: 63 IN

## 2021-06-01 VITALS — BODY MASS INDEX: 44.54 KG/M2 | HEIGHT: 63 IN | WEIGHT: 251.4 LBS

## 2021-06-01 NOTE — TELEPHONE ENCOUNTER
Refill Approved    Rx renewed per Medication Renewal Policy. Medication was last renewed on 7/11/19.8/5/19    Marjorie Wise, [facility] Triage/Med Refill 10/2/2019     Requested Prescriptions   Pending Prescriptions Disp Refills     hydroCHLOROthiazide (HYDRODIURIL) 25 MG tablet [Pharmacy Med Name: HYDROCHLOROTHIAZIDE 25MG TABLETS] 90 tablet 0     Sig: TAKE 1 TABLET(25 MG) BY MOUTH DAILY       Diuretics/Combination Diuretics Refill Protocol  Passed - 10/2/2019  5:07 AM        Passed - Visit with PCP or prescribing provider visit in past 12 months     Last office visit with prescriber/PCP: 8/5/2019 Leonora Dolan CNP OR same dept: 8/5/2019 Leonora Dolan CNP OR same specialty: 8/5/2019 Leonora Dolan CNP  Last physical: 7/11/2019 Last MTM visit: Visit date not found   Next visit within 3 mo: Visit date not found  Next physical within 3 mo: Visit date not found  Prescriber OR PCP: Leonora Dolan CNP  Last diagnosis associated with med order: 1. Hypertension  - hydroCHLOROthiazide (HYDRODIURIL) 25 MG tablet [Pharmacy Med Name: HYDROCHLOROTHIAZIDE 25MG TABLETS]; TAKE 1 TABLET(25 MG) BY MOUTH DAILY  Dispense: 90 tablet; Refill: 0    2. Mixed hyperlipidemia  - atorvastatin (LIPITOR) 20 MG tablet [Pharmacy Med Name: ATORVASTATIN 20MG TABLETS]; TAKE 1 TABLET(20 MG) BY MOUTH AT BEDTIME  Dispense: 90 tablet; Refill: 0    If protocol passes may refill for 12 months if within 3 months of last provider visit (or a total of 15 months).             Passed - Serum Potassium in past 12 months      Lab Results   Component Value Date    Potassium 4.1 08/05/2019             Passed - Serum Sodium in past 12 months      Lab Results   Component Value Date    Sodium 140 08/05/2019             Passed - Blood pressure on file in past 12 months     BP Readings from Last 1 Encounters:   09/30/19 112/70             Passed - Serum Creatinine in past 12 months      Creatinine   Date Value Ref Range Status   08/05/2019 0.74 0.60 - 1.10  mg/dL Final             atorvastatin (LIPITOR) 20 MG tablet [Pharmacy Med Name: ATORVASTATIN 20MG TABLETS] 90 tablet 0     Sig: TAKE 1 TABLET(20 MG) BY MOUTH AT BEDTIME       Statins Refill Protocol (Hmg CoA Reductase Inhibitors) Passed - 10/2/2019  5:07 AM        Passed - PCP or prescribing provider visit in past 12 months      Last office visit with prescriber/PCP: 8/5/2019 Leonora Dolan CNP OR same dept: 8/5/2019 Leonora Dolan CNP OR same specialty: 8/5/2019 Leonora Dolan CNP  Last physical: 7/11/2019 Last MTM visit: Visit date not found   Next visit within 3 mo: Visit date not found  Next physical within 3 mo: Visit date not found  Prescriber OR PCP: Leonora Dolan CNP  Last diagnosis associated with med order: 1. Hypertension  - hydroCHLOROthiazide (HYDRODIURIL) 25 MG tablet [Pharmacy Med Name: HYDROCHLOROTHIAZIDE 25MG TABLETS]; TAKE 1 TABLET(25 MG) BY MOUTH DAILY  Dispense: 90 tablet; Refill: 0    2. Mixed hyperlipidemia  - atorvastatin (LIPITOR) 20 MG tablet [Pharmacy Med Name: ATORVASTATIN 20MG TABLETS]; TAKE 1 TABLET(20 MG) BY MOUTH AT BEDTIME  Dispense: 90 tablet; Refill: 0    If protocol passes may refill for 12 months if within 3 months of last provider visit (or a total of 15 months).

## 2021-06-01 NOTE — PROGRESS NOTES
Vascular Medicine Progress note    Dr Scooter Brunson MD, Vascular Medicine      Rochelle Garza    Medical Record #:  [id number]    Date of Service: 09/30/2019     Date last seen:  Visit date not found    PRIMARY CARE PROVIDER: Leonora Dolan CNP      IMPRESSION/PLAN: Bilateral leg swelling, swelling is worse at the end of the day better in the early morning in addition patient did notice that there is discoloration of both feet on leg dependency and is better with leg elevation  Patient denies any history of restless leg symptoms, spontaneous bleeding spontaneous ulceration no history of claudication yet the patient reports pain when she walks pain affects both lower extremities after walking for almost like half a block but pain is better when she rests  Patient denies any history of chest pain shortness of breath or palpitations  Patient denies any history of DVT no history of varicose veins yet the patient did work as a CNA for 28 years    DISPOSITION:  1- Doppler venous ultrasound with insufficiency study  2-patient will definitely benefit from compression treatment, the degree and the level is to be determined once test results are available  3- care instructions were given to the patient in addition to skin care  4- EVIE with toe pressures  5-we will see the patient once test results are available      ______________________________________________________________________    Subjective:    ALLERGIES:  Penicillins and Sulfites    MEDS:    Current Outpatient Medications:      atorvastatin (LIPITOR) 20 MG tablet, Take 1 tablet (20 mg total) by mouth at bedtime., Disp: 90 tablet, Rfl: 0     ciclopirox (PENLAC) 8 % solution, Apply over nail and surrounding skin. Apply daily over previous coat. After seven (7) days, may remove with alcohol and continue cycle., Disp: 6.6 mL, Rfl: 3     FLUoxetine (PROZAC) 10 MG tablet, Take 1 tablet (10 mg total) by mouth daily., Disp: 90 tablet, Rfl: 2     fluticasone (FLONASE) 50  "mcg/actuation nasal spray, SHAKE WELL AND USE 2 SPRAYS IN EACH NOSTRIL DAILY FOR 10 DAYS, Disp: 48 g, Rfl: 3     hydroCHLOROthiazide (HYDRODIURIL) 25 MG tablet, Take 1 tablet (25 mg total) by mouth daily., Disp: 90 tablet, Rfl: 2     ibuprofen (ADVIL,MOTRIN) 600 MG tablet, Take 1 tablet (600 mg total) by mouth every 6 (six) hours as needed for pain., Disp: 30 tablet, Rfl: 0     VENTOLIN HFA 90 mcg/actuation inhaler, INHALE 2 PUFF EVERY 4 HOURS AS NEEDED, Disp: 3 each, Rfl: 1    REVIEW OF SYSTEMS:    A 12 point ROS was reviewed and except for what is listed in the HPI above, all others are negative    Objective:    PE:  /70 (Patient Site: Left Arm, Patient Position: Sitting, Cuff Size: Adult Large)   Pulse 68   Temp 98.6  F (37  C) (Oral)   Ht 5' 4\" (1.626 m)   Wt (!) 237 lb (107.5 kg)   BMI 40.68 kg/m    Wt Readings from Last 1 Encounters:   09/30/19 (!) 237 lb (107.5 kg)     Body mass index is 40.68 kg/m .    EXAM:  GENERAL: This is a well-developed 59 y.o. female who appears her stated age  EYES: Grossly normal.  MOUTH: Buccal mucosa normal   CARDIAC:  Normal S1 and S2, no Murmur  CHEST/LUNG:  Clear lung fields bilaterally  GASTROINTESINAL (ABDOMEN):Soft, non-tender, B/S present, no pulsatile mass     MUSCULOSKELETAL: Grossly normal and both lower extremities are intact.  HEME/LYMPH: No lymphedema  NEUROLOGIC: Focally intact, Alert and oriented x 3.   PSYCH: appropriate affect  INTEGUMENT: No open lesions or ulcers  Pulse Exam: Palpable DP and PT pulses bilaterally  Circumferential measures:  Vasc Edema 9/30/2019   Right just above MTP 23.1   Right Ankle 25   Right Widest Calf 40.7   Left - just above MTP 23.1   Left Ankle 24.5   Left Widest Calf 42           DIAGNOSTIC STUDIES:     No results found.  I personally reviewed the following imaging today and those on care everywhere, if indicated    LABS:      Sodium   Date Value Ref Range Status   08/05/2019 140 136 - 145 mmol/L Final   07/11/2019 142 136 " - 145 mmol/L Final   06/14/2018 141 136 - 145 mmol/L Final     Potassium   Date Value Ref Range Status   08/05/2019 4.1 3.5 - 5.0 mmol/L Final   07/11/2019 4.3 3.5 - 5.0 mmol/L Final   06/14/2018 4.4 3.5 - 5.0 mmol/L Final     Chloride   Date Value Ref Range Status   08/05/2019 103 98 - 107 mmol/L Final   07/11/2019 107 98 - 107 mmol/L Final   06/14/2018 109 (H) 98 - 107 mmol/L Final     BUN   Date Value Ref Range Status   08/05/2019 14 8 - 22 mg/dL Final   07/11/2019 9 8 - 22 mg/dL Final   06/14/2018 12 8 - 22 mg/dL Final     Creatinine   Date Value Ref Range Status   08/05/2019 0.74 0.60 - 1.10 mg/dL Final   07/11/2019 0.77 0.60 - 1.10 mg/dL Final   06/14/2018 0.74 0.60 - 1.10 mg/dL Final     Hemoglobin   Date Value Ref Range Status   07/11/2019 15.6 12.0 - 16.0 g/dL Final   06/14/2018 15.6 12.0 - 16.0 g/dL Final   06/12/2017 15.8 12.0 - 16.0 g/dL Final     Platelets   Date Value Ref Range Status   10/07/2010 188 140 - 440 thou/uL Final     BNP   Date Value Ref Range Status   09/07/2012 17 <79 pg/mL Final   10/07/2010 <10 <75 pg/mL Final     Comment:     Checked and verified.       Total time spent 25  minutes face to face with patient with more than 50% time spent in counseling and coordination of care.    Scooter Brunson MD  VASCULAR MEDICINE

## 2021-06-01 NOTE — TELEPHONE ENCOUNTER
Refill Approved    Rx renewed per Medication Renewal Policy. Medication was last renewed on 7/11/19.    Marjorie Wise, [facility] Triage/Med Refill 9/26/2019     Requested Prescriptions   Pending Prescriptions Disp Refills     VENTOLIN HFA 90 mcg/actuation inhaler [Pharmacy Med Name: VENTOLIN HFA INH W/DOS CTR 200PUFFS] 90 g 0     Sig: INHALE 2 PUFF EVERY 4 HOURS AS NEEDED       Albuterol/Levalbuterol Refill Protocol Passed - 9/25/2019  9:11 AM        Passed - PCP or prescribing provider visit in last year     Last office visit with prescriber/PCP: 8/5/2019 Leonora Dolan CNP OR same dept: 8/5/2019 Leonora Dolan CNP OR same specialty: 8/5/2019 Leonora Dolan CNP Last physical: 7/11/2019       Next appt within 3 mo: Visit date not found  Next physical within 3 mo: Visit date not found  Prescriber OR PCP: Leonora Dolan CNP  Last diagnosis associated with med order: 1. Acute bronchitis, unspecified organism  - VENTOLIN HFA 90 mcg/actuation inhaler [Pharmacy Med Name: VENTOLIN HFA INH W/DOS CTR 200PUFFS]; INHALE 2 PUFF EVERY 4 HOURS AS NEEDED  Dispense: 90 g; Refill: 0    If protocol passes may refill for 6 months if within 3 months of last provider visit (or a total of 9 months). If patient requesting >1 inhaler per month refill x 6 months and have patient make appointment with provider.

## 2021-06-02 ENCOUNTER — RECORDS - HEALTHEAST (OUTPATIENT)
Dept: ADMINISTRATIVE | Facility: CLINIC | Age: 61
End: 2021-06-02

## 2021-06-02 VITALS — WEIGHT: 244 LBS | BODY MASS INDEX: 43.92 KG/M2

## 2021-06-02 NOTE — TELEPHONE ENCOUNTER
----- Message from Leonora Dolan CNP sent at 10/10/2019  7:37 PM CDT -----  Please notify the patient that her cholesterol has improved, but her triglycerides remain elevated.  I increased her Atorvastatin to 40 mg daily.  I recommend a lab only recheck in 3 months.  Thanks.

## 2021-06-02 NOTE — PROGRESS NOTES
Vascular Medicine Progress note    Dr Scooter Brunson MD, Vascular Medicine      Rochelle Garza    Medical Record #:  [id number]    Date of Service: 10/07/2019     Date last seen:  9/30/2019    PRIMARY CARE PROVIDER: Leonora Dolan CNP      IMPRESSION/PLAN: Patient is here for follow-up  ABIs were completely normal no evidence of PAD not consistent with  Venous insufficiency study showed no significant evidence of venous reflux    DISPOSITION: Bilateral leg swelling no evidence of significant venous reflux and no evidence of secondary lymphedema    1- patient can benefit from compression treatment, 15 to 20 mmHg, knee-high bilateral  2- follow-up in 3 months      ______________________________________________________________________    Subjective:    ALLERGIES:  Penicillins and Sulfites    MEDS:    Current Outpatient Medications:      atorvastatin (LIPITOR) 20 MG tablet, TAKE 1 TABLET(20 MG) BY MOUTH AT BEDTIME, Disp: 90 tablet, Rfl: 3     ciclopirox (PENLAC) 8 % solution, Apply over nail and surrounding skin. Apply daily over previous coat. After seven (7) days, may remove with alcohol and continue cycle., Disp: 6.6 mL, Rfl: 3     FLUoxetine (PROZAC) 10 MG tablet, Take 1 tablet (10 mg total) by mouth daily., Disp: 90 tablet, Rfl: 2     fluticasone (FLONASE) 50 mcg/actuation nasal spray, SHAKE WELL AND USE 2 SPRAYS IN EACH NOSTRIL DAILY FOR 10 DAYS, Disp: 48 g, Rfl: 3     hydroCHLOROthiazide (HYDRODIURIL) 25 MG tablet, Take 1 tablet (25 mg total) by mouth daily., Disp: 90 tablet, Rfl: 2     ibuprofen (ADVIL,MOTRIN) 600 MG tablet, Take 1 tablet (600 mg total) by mouth every 6 (six) hours as needed for pain., Disp: 30 tablet, Rfl: 0     VENTOLIN HFA 90 mcg/actuation inhaler, INHALE 2 PUFF EVERY 4 HOURS AS NEEDED, Disp: 3 each, Rfl: 1    REVIEW OF SYSTEMS:    A 12 point ROS was reviewed and except for what is listed in the HPI above, all others are negative    Objective:    PE:  /86 (Patient Site: Left Arm, Patient  "Position: Sitting, Cuff Size: Adult Large)   Pulse 68   Temp 98  F (36.7  C) (Oral)   Resp 16   Ht 5' 4\" (1.626 m)   Wt (!) 243 lb (110.2 kg)   BMI 41.71 kg/m    Wt Readings from Last 1 Encounters:   10/07/19 (!) 243 lb (110.2 kg)     Body mass index is 41.71 kg/m .    EXAM:  GENERAL: This is a well-developed 59 y.o. female who appears her stated age  EYES: Grossly normal.  MOUTH: Buccal mucosa normal   CARDIAC:  Normal S1 and S2, no Murmur  CHEST/LUNG:  Clear lung fields bilaterally  GASTROINTESINAL (ABDOMEN):Soft, non-tender, B/S present, no pulsatile mass     MUSCULOSKELETAL: Grossly normal and both lower extremities are intact.  HEME/LYMPH: No lymphedema  NEUROLOGIC: Focally intact, Alert and oriented x 3.   PSYCH: appropriate affect  INTEGUMENT: No open lesions or ulcers  Pulse Exam: Intact  Circumferential measures:  Vasc Edema 9/30/2019   Right just above MTP 23.1   Right Ankle 25   Right Widest Calf 40.7   Left - just above MTP 23.1   Left Ankle 24.5   Left Widest Calf 42           DIAGNOSTIC STUDIES:     No results found.  I personally reviewed the following imaging today and those on care everywhere, if indicated    LABS:      Sodium   Date Value Ref Range Status   08/05/2019 140 136 - 145 mmol/L Final   07/11/2019 142 136 - 145 mmol/L Final   06/14/2018 141 136 - 145 mmol/L Final     Potassium   Date Value Ref Range Status   08/05/2019 4.1 3.5 - 5.0 mmol/L Final   07/11/2019 4.3 3.5 - 5.0 mmol/L Final   06/14/2018 4.4 3.5 - 5.0 mmol/L Final     Chloride   Date Value Ref Range Status   08/05/2019 103 98 - 107 mmol/L Final   07/11/2019 107 98 - 107 mmol/L Final   06/14/2018 109 (H) 98 - 107 mmol/L Final     BUN   Date Value Ref Range Status   08/05/2019 14 8 - 22 mg/dL Final   07/11/2019 9 8 - 22 mg/dL Final   06/14/2018 12 8 - 22 mg/dL Final     Creatinine   Date Value Ref Range Status   08/05/2019 0.74 0.60 - 1.10 mg/dL Final   07/11/2019 0.77 0.60 - 1.10 mg/dL Final   06/14/2018 0.74 0.60 - 1.10 " mg/dL Final     Hemoglobin   Date Value Ref Range Status   07/11/2019 15.6 12.0 - 16.0 g/dL Final   06/14/2018 15.6 12.0 - 16.0 g/dL Final   06/12/2017 15.8 12.0 - 16.0 g/dL Final     Platelets   Date Value Ref Range Status   10/07/2010 188 140 - 440 thou/uL Final     BNP   Date Value Ref Range Status   09/07/2012 17 <79 pg/mL Final   10/07/2010 <10 <75 pg/mL Final     Comment:     Checked and verified.       Total time spent 15  minutes face to face with patient with more than 50% time spent in counseling and coordination of care.    Scooter Brunson MD  VASCULAR MEDICINE

## 2021-06-02 NOTE — TELEPHONE ENCOUNTER
Patient notified of us results.  She will wear her compression stockings as ordered and follow up with MD Brunson January, 2020

## 2021-06-02 NOTE — PROGRESS NOTES
Assessment and Plan:     1. Mixed hyperlipidemia  Patient is tolerating a atorvastatin well.  She is not fasting today.  She will follow-up for fasting lab only cholesterol check within the next week.  - Lipid Cascade; Future    2. Mild episode of recurrent major depressive disorder (H)  She continues fluoxetine 10 mg daily.    3. Hypertension  This is controlled with hydrochlorothiazide.    4. Edema, unspecified type  She continues hydrochlorothiazide.  She has a follow-up appointment with vascular clinic next week.  She is content with the plan.      Subjective:     Rochelle is a 59 y.o. female presenting to the clinic for cholesterol check.  Patient has multiple comorbidities including hypertension, NADIA, depression, edema, hyperlipidemia.  Patient restarted atorvastatin 20 mg daily for hyperlipidemia.  She is tolerating the medication well.  Last cholesterol check was on 7/11/2019 with a total cholesterol 207, triglycerides 241, HDL 36, .  She has hypertension which is controlled with hydrochlorothiazide 25 mg daily.  She does not check her blood pressure outside the clinic.  She denies chest pain, shortness of breath with exertion, orthopnea, syncope.  She is seen in the vascular clinic for lower extremity edema.  Patient states she may wear compression socks.  She is taking fluoxetine 10 mg daily for depression.  This is well controlled.  She denies thoughts of suicide.    Review of Systems: A complete 14 point review of systems was obtained and is negative or as stated in the history of present illness.    Social History     Socioeconomic History     Marital status: Single     Spouse name: Not on file     Number of children: Not on file     Years of education: Not on file     Highest education level: Not on file   Occupational History     Not on file   Social Needs     Financial resource strain: Not on file     Food insecurity:     Worry: Not on file     Inability: Not on file     Transportation needs:      Medical: Not on file     Non-medical: Not on file   Tobacco Use     Smoking status: Current Every Day Smoker     Packs/day: 0.50     Smokeless tobacco: Never Used   Substance and Sexual Activity     Alcohol use: No     Drug use: No     Sexual activity: Never   Lifestyle     Physical activity:     Days per week: Not on file     Minutes per session: Not on file     Stress: Not on file   Relationships     Social connections:     Talks on phone: Not on file     Gets together: Not on file     Attends Taoist service: Not on file     Active member of club or organization: Not on file     Attends meetings of clubs or organizations: Not on file     Relationship status: Not on file     Intimate partner violence:     Fear of current or ex partner: Not on file     Emotionally abused: Not on file     Physically abused: Not on file     Forced sexual activity: Not on file   Other Topics Concern     Not on file   Social History Narrative     Not on file       Active Ambulatory Problems     Diagnosis Date Noted     Osteoarthrosis      Rosacea      Insomnia      Edema      Allergies      Morbid Obesity      Major Depression, Recurrent      Obstructive sleep apnea      Hypertension      Diverticulitis Of Colon      Abnormal involuntary movement      Mixed hyperlipidemia 06/12/2017     Polyarthralgia 08/02/2017     Primary osteoarthritis involving multiple joints 08/02/2017     CMC DJD(carpometacarpal degenerative joint disease), localized primary 08/02/2017     Lumbar spondylosis 11/06/2017     Resolved Ambulatory Problems     Diagnosis Date Noted     Dyspepsia      Hyperlipidemia      Rheumatoid arthritis (H) 06/06/2016     Past Medical History:   Diagnosis Date     Anxiety      Depression      Hyperlipidemia      Hypertension      Ingrown toenail      Onychomycosis      Painful swelling of joint      Sleep apnea        Family History   Problem Relation Age of Onset     Breast cancer Mother 49     Hypertension Father      Dementia  "Father      No Medical Problems Sister      No Medical Problems Daughter      No Medical Problems Maternal Grandmother      No Medical Problems Maternal Grandfather      Diabetes Paternal Grandmother      No Medical Problems Paternal Grandfather      No Medical Problems Maternal Aunt      No Medical Problems Paternal Aunt      BRCA 1/2 Neg Hx      Colon cancer Neg Hx      Endometrial cancer Neg Hx      Ovarian cancer Neg Hx        Objective:     /72   Pulse 82   Ht 5' 4\" (1.626 m)   Wt (!) 243 lb 12.8 oz (110.6 kg)   SpO2 95%   BMI 41.85 kg/m      Patient is alert, in no obvious distress.   Skin: Warm, dry.   Neck: Supple, no lymphadenopathy. No thyromegaly.  Lungs:  Clear to auscultation. Respirations even and unlabored.  No wheezing or rales noted.   Heart:  Regular rate and rhythm.  No murmurs, S3, S4, gallops, or rubs.    Abdomen: Soft, nontender.  No organomegaly. Bowel sounds normoactive. No guarding or masses noted.   Musculoskeletal:  +1 pitting edema present in bilateral lower extremities.               "

## 2021-06-03 VITALS
WEIGHT: 243 LBS | BODY MASS INDEX: 41.48 KG/M2 | DIASTOLIC BLOOD PRESSURE: 86 MMHG | TEMPERATURE: 98 F | SYSTOLIC BLOOD PRESSURE: 118 MMHG | HEIGHT: 64 IN | RESPIRATION RATE: 16 BRPM | HEART RATE: 68 BPM

## 2021-06-03 VITALS
WEIGHT: 237 LBS | SYSTOLIC BLOOD PRESSURE: 112 MMHG | TEMPERATURE: 98.6 F | HEART RATE: 68 BPM | HEIGHT: 64 IN | DIASTOLIC BLOOD PRESSURE: 70 MMHG | BODY MASS INDEX: 40.46 KG/M2

## 2021-06-03 VITALS — BODY MASS INDEX: 40.46 KG/M2 | WEIGHT: 237 LBS | HEIGHT: 64 IN

## 2021-06-03 VITALS — HEIGHT: 64 IN | BODY MASS INDEX: 40.46 KG/M2 | WEIGHT: 237 LBS

## 2021-06-03 VITALS
OXYGEN SATURATION: 95 % | HEIGHT: 64 IN | WEIGHT: 243.8 LBS | HEART RATE: 82 BPM | BODY MASS INDEX: 41.62 KG/M2 | DIASTOLIC BLOOD PRESSURE: 72 MMHG | SYSTOLIC BLOOD PRESSURE: 110 MMHG

## 2021-06-03 VITALS — HEIGHT: 64 IN | WEIGHT: 238 LBS | BODY MASS INDEX: 40.63 KG/M2

## 2021-06-04 VITALS
HEART RATE: 86 BPM | DIASTOLIC BLOOD PRESSURE: 80 MMHG | SYSTOLIC BLOOD PRESSURE: 120 MMHG | TEMPERATURE: 98.2 F | OXYGEN SATURATION: 98 % | WEIGHT: 243 LBS | BODY MASS INDEX: 41.48 KG/M2 | HEIGHT: 64 IN

## 2021-06-04 VITALS
HEIGHT: 64 IN | WEIGHT: 244.6 LBS | HEART RATE: 81 BPM | BODY MASS INDEX: 41.76 KG/M2 | SYSTOLIC BLOOD PRESSURE: 112 MMHG | DIASTOLIC BLOOD PRESSURE: 74 MMHG | OXYGEN SATURATION: 93 %

## 2021-06-04 NOTE — TELEPHONE ENCOUNTER
Refill Approved    Rx renewed per Medication Renewal Policy. Medication was last renewed on 7.11.19.    Helen Yusuf, [facility] Triage/Med Refill 1/1/2020     Requested Prescriptions   Pending Prescriptions Disp Refills     atorvastatin (LIPITOR) 40 MG tablet [Pharmacy Med Name: ATORVASTATIN 40MG TABLETS] 90 tablet 0     Sig: TAKE 1 TABLET(40 MG) BY MOUTH AT BEDTIME       Statins Refill Protocol (Hmg CoA Reductase Inhibitors) Passed - 12/30/2019 11:13 AM        Passed - PCP or prescribing provider visit in past 12 months      Last office visit with prescriber/PCP: 10/4/2019 Leonora Dolan CNP OR same dept: 10/4/2019 Leonora Dolan CNP OR same specialty: 10/4/2019 Leonora Dolan CNP  Last physical: 7/11/2019 Last MTM visit: Visit date not found   Next visit within 3 mo: Visit date not found  Next physical within 3 mo: Visit date not found  Prescriber OR PCP: Leonora Dolan CNP  Last diagnosis associated with med order: 1. Mixed hyperlipidemia  - atorvastatin (LIPITOR) 40 MG tablet [Pharmacy Med Name: ATORVASTATIN 40MG TABLETS]; TAKE 1 TABLET(40 MG) BY MOUTH AT BEDTIME  Dispense: 90 tablet; Refill: 0    If protocol passes may refill for 12 months if within 3 months of last provider visit (or a total of 15 months).

## 2021-06-05 VITALS
BODY MASS INDEX: 41.94 KG/M2 | HEIGHT: 65 IN | OXYGEN SATURATION: 98 % | WEIGHT: 251.7 LBS | SYSTOLIC BLOOD PRESSURE: 122 MMHG | HEART RATE: 85 BPM | DIASTOLIC BLOOD PRESSURE: 90 MMHG

## 2021-06-05 NOTE — PROGRESS NOTES
"Vascular Medicine Progress note    Dr Scooter Brunson MD, Vascular Medicine      Rochelle Garza    Medical Record #:  [id number]    Date of Service: 01/13/2020     Date last seen:  10/7/2019    PRIMARY CARE PROVIDER: Leonora Dolan CNP      IMPRESSION/PLAN: Bilateral leg swelling secondary to venous insufficiency  Patient wears her compression stockings her measurements are much better than the initial exam and her symptoms are way way better than before she has some residual fatigue and leg tightness other than that she is almost symptom-free    DISPOSITION:  Continue with wearing compression stockings follow-up in 1 year      ______________________________________________________________________    Subjective:    ALLERGIES:  Penicillins and Sulfites    MEDS:    Current Outpatient Medications:      atorvastatin (LIPITOR) 40 MG tablet, TAKE 1 TABLET(40 MG) BY MOUTH AT BEDTIME, Disp: 90 tablet, Rfl: 0     FLUoxetine (PROZAC) 10 MG tablet, Take 1 tablet (10 mg total) by mouth daily., Disp: 90 tablet, Rfl: 2     fluticasone (FLONASE) 50 mcg/actuation nasal spray, SHAKE WELL AND USE 2 SPRAYS IN EACH NOSTRIL DAILY FOR 10 DAYS, Disp: 48 g, Rfl: 3     hydroCHLOROthiazide (HYDRODIURIL) 25 MG tablet, Take 1 tablet (25 mg total) by mouth daily., Disp: 90 tablet, Rfl: 2     ibuprofen (ADVIL,MOTRIN) 600 MG tablet, Take 1 tablet (600 mg total) by mouth every 6 (six) hours as needed for pain., Disp: 30 tablet, Rfl: 0     VENTOLIN HFA 90 mcg/actuation inhaler, INHALE 2 PUFF EVERY 4 HOURS AS NEEDED, Disp: 3 each, Rfl: 1    REVIEW OF SYSTEMS:    A 12 point ROS was reviewed and except for what is listed in the HPI above, all others are negative    Objective:    PE:  /80   Pulse 86   Temp 98.2  F (36.8  C)   Ht 5' 4\" (1.626 m)   Wt (!) 243 lb (110.2 kg)   SpO2 98%   BMI 41.71 kg/m    Wt Readings from Last 1 Encounters:   01/13/20 (!) 243 lb (110.2 kg)     Body mass index is 41.71 kg/m .    EXAM:  GENERAL: This is a " well-developed 59 y.o. female who appears her stated age  EYES: Grossly normal.  MOUTH: Buccal mucosa normal   CARDIAC:  Normal S1 and S2, no Murmur  CHEST/LUNG:  Clear lung fields bilaterally  GASTROINTESINAL (ABDOMEN):Soft, non-tender, B/S present, no pulsatile mass     MUSCULOSKELETAL: Grossly normal and both lower extremities are intact.  HEME/LYMPH: No lymphedema  NEUROLOGIC: Focally intact, Alert and oriented x 3.   PSYCH: appropriate affect  INTEGUMENT: No open lesions or ulcers  Pulse Exam: palpable  Circumferential measures:  Vasc Edema 9/30/2019 1/13/2020   Right just above MTP 23.1 23   Right Ankle 25 25   Right Widest Calf 40.7 41   Left - just above MTP 23.1 23   Left Ankle 24.5 21   Left Widest Calf 42 41           DIAGNOSTIC STUDIES:     No results found.  I personally reviewed the following imaging today and those on care everywhere, if indicated    LABS:      Sodium   Date Value Ref Range Status   08/05/2019 140 136 - 145 mmol/L Final   07/11/2019 142 136 - 145 mmol/L Final   06/14/2018 141 136 - 145 mmol/L Final     Potassium   Date Value Ref Range Status   08/05/2019 4.1 3.5 - 5.0 mmol/L Final   07/11/2019 4.3 3.5 - 5.0 mmol/L Final   06/14/2018 4.4 3.5 - 5.0 mmol/L Final     Chloride   Date Value Ref Range Status   08/05/2019 103 98 - 107 mmol/L Final   07/11/2019 107 98 - 107 mmol/L Final   06/14/2018 109 (H) 98 - 107 mmol/L Final     BUN   Date Value Ref Range Status   08/05/2019 14 8 - 22 mg/dL Final   07/11/2019 9 8 - 22 mg/dL Final   06/14/2018 12 8 - 22 mg/dL Final     Creatinine   Date Value Ref Range Status   08/05/2019 0.74 0.60 - 1.10 mg/dL Final   07/11/2019 0.77 0.60 - 1.10 mg/dL Final   06/14/2018 0.74 0.60 - 1.10 mg/dL Final     Hemoglobin   Date Value Ref Range Status   07/11/2019 15.6 12.0 - 16.0 g/dL Final   06/14/2018 15.6 12.0 - 16.0 g/dL Final   06/12/2017 15.8 12.0 - 16.0 g/dL Final     Platelets   Date Value Ref Range Status   10/07/2010 188 140 - 440 thou/uL Final     BNP    Date Value Ref Range Status   09/07/2012 17 <79 pg/mL Final   10/07/2010 <10 <75 pg/mL Final     Comment:     Checked and verified.       Total time spent 25 (15,25,35) minutes face to face with patient with more than 50% time spent in counseling and coordination of care.    Scooter Brunson MD  VASCULAR MEDICINE

## 2021-06-05 NOTE — PROGRESS NOTES
Assessment and Plan:     1. Mild episode of recurrent major depressive disorder (H)  Obtain PHQ 9 score of 4.  She continues fluoxetine 10 mg daily.  This is controlled.  - FLUoxetine (PROZAC) 10 MG tablet; Take 1 tablet (10 mg total) by mouth daily.  Dispense: 90 tablet; Refill: 2    2. Mixed hyperlipidemia  Will check lipid cascade and adjust atorvastatin accordingly.  She is taking 40 mg daily.  - Lipid [place]    3. Hypertension  This is controlled with hydrochlorothiazide.    4. Lumbar spondylosis  She continues ibuprofen as needed for lumbar pain.  - ibuprofen (ADVIL,MOTRIN) 600 MG tablet; Take 1 tablet (600 mg total) by mouth every 6 (six) hours as needed for pain.  Dispense: 30 tablet; Refill: 0    5. Morbid Obesity  Discussed weight loss goals.  The following high BMI interventions were performed this visit: encouragement to exercise and lifestyle education regarding diet    6. Obstructive sleep apnea  Patient requires 2 L of oxygen at bedtime for her CPAP machine for NADIA.    7.  Smoking  I have counseled the patient for tobacco cessation and the follow up will occur  at the next visit.      Subjective:     Rochelle is a 59 y.o. female presenting to the clinic for medication management.  Patient has hypertension which is controlled with hydrochlorothiazide 25 mg daily.  She denies chest pain, shortness of breath with exertion, edema, orthopnea, syncope.  She is taking a atorvastatin 40 mg daily for hyperlipidemia.  She tries to consume healthy diet and walks for exercise.  She does continue to smoke 1/2 pack/day and says she is not ready to quit.  She takes ibuprofen as needed for chronic lumbar pain from lumbar spondylosis.  Patient states she takes this sparingly.  She is taking fluoxetine 10 mg daily for depression.  She feels as though she is in a good place.  She occasionally has difficulty sleeping, but this does not affect her mood.  She denies thoughts of suicide.  Patient uses Flonase daily for  allergies which is well controlled.  Patient requests a letter regarding the need of oxygen for her CPAP machine.  She uses this for NADIA.    Review of Systems: A complete 14 point review of systems was obtained and is negative or as stated in the history of present illness.    Social History     Socioeconomic History     Marital status: Single     Spouse name: Not on file     Number of children: Not on file     Years of education: Not on file     Highest education level: Not on file   Occupational History     Not on file   Social Needs     Financial resource strain: Not on file     Food insecurity:     Worry: Not on file     Inability: Not on file     Transportation needs:     Medical: Not on file     Non-medical: Not on file   Tobacco Use     Smoking status: Current Every Day Smoker     Packs/day: 0.50     Smokeless tobacco: Never Used   Substance and Sexual Activity     Alcohol use: No     Drug use: No     Sexual activity: Never   Lifestyle     Physical activity:     Days per week: Not on file     Minutes per session: Not on file     Stress: Not on file   Relationships     Social connections:     Talks on phone: Not on file     Gets together: Not on file     Attends Cheondoism service: Not on file     Active member of club or organization: Not on file     Attends meetings of clubs or organizations: Not on file     Relationship status: Not on file     Intimate partner violence:     Fear of current or ex partner: Not on file     Emotionally abused: Not on file     Physically abused: Not on file     Forced sexual activity: Not on file   Other Topics Concern     Not on file   Social History Narrative     Not on file       Active Ambulatory Problems     Diagnosis Date Noted     Osteoarthrosis      Rosacea      Insomnia      Edema      Allergies      Morbid Obesity      Major Depression, Recurrent      Obstructive sleep apnea      Hypertension      Diverticulitis Of Colon      Abnormal involuntary movement      Mixed  "hyperlipidemia 06/12/2017     Polyarthralgia 08/02/2017     Primary osteoarthritis involving multiple joints 08/02/2017     CMC DJD(carpometacarpal degenerative joint disease), localized primary 08/02/2017     Lumbar spondylosis 11/06/2017     Resolved Ambulatory Problems     Diagnosis Date Noted     Dyspepsia      Hyperlipidemia      Rheumatoid arthritis (H) 06/06/2016     Past Medical History:   Diagnosis Date     Anxiety      Depression      Hyperlipidemia      Hypertension      Ingrown toenail      Onychomycosis      Painful swelling of joint      Sleep apnea        Family History   Problem Relation Age of Onset     Breast cancer Mother 49     Hypertension Father      Dementia Father      No Medical Problems Sister      No Medical Problems Daughter      No Medical Problems Maternal Grandmother      No Medical Problems Maternal Grandfather      Diabetes Paternal Grandmother      No Medical Problems Paternal Grandfather      No Medical Problems Maternal Aunt      No Medical Problems Paternal Aunt      BRCA 1/2 Neg Hx      Colon cancer Neg Hx      Endometrial cancer Neg Hx      Ovarian cancer Neg Hx        Objective:     /74 (Patient Site: Right Arm, Cuff Size: Adult Large)   Pulse 81   Ht 5' 4\" (1.626 m)   Wt (!) 244 lb 9.6 oz (110.9 kg)   SpO2 93%   BMI 41.99 kg/m      Patient is alert, in no obvious distress.   Skin: Warm, dry.  Neck: Supple, no lymphadenopathy, JVD, bruits noted.  No thyromegaly.  Lungs:  Clear to auscultation. Respirations even and unlabored.  No wheezing or rales noted.   Heart:  Regular rate and rhythm.  No murmurs, S3, S4, gallops, or rubs.    Abdomen: Soft, nontender.  No organomegaly. Bowel sounds normoactive. No guarding or masses noted.   Musculoskeletal:  No edema is present in bilateral lower extremities.                 "

## 2021-06-07 NOTE — TELEPHONE ENCOUNTER
Medication Question or Clarification  Who is calling: Patient  What medication are you calling about (include dose and sig)?:    Disp  Refills  Start  End     FLUoxetine (PROZAC) 10 MG tablet  90 tablet  2  1/14/2020      Sig - Route: Take 1 tablet (10 mg total) by mouth daily. - Oral     Sent to pharmacy as: FLUoxetine 10 mg tablet (PROzac)     E-Prescribing Status: Receipt confirmed by pharmacy (1/14/2020  7:58 AM CST)         Who prescribed the medication?: Leonora Dolan CNP    What is your question/concern?: Patient is requesting this medication to be filled in capsule form, as then her insurance covers it at 100%, where now with the tablets the patient pays 25.00 each refill out of pocket.  Requested [facility]acy: [id number]  Okay to leave a detailed message?: Yes
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
## 2021-06-09 NOTE — TELEPHONE ENCOUNTER
Refill Approved    Rx renewed per Medication Renewal Policy. Medication was last renewed on 8/5/19.    Marjorie Wise, [facility] Triage/Med Refill 6/29/2020     Requested Prescriptions   Pending Prescriptions Disp Refills     hydroCHLOROthiazide (HYDRODIURIL) 25 MG tablet [Pharmacy Med Name: HYDROCHLOROTHIAZIDE 25MG TABLETS] 90 tablet 2     Sig: TAKE 1 TABLET(25 MG) BY MOUTH DAILY       Diuretics/Combination Diuretics Refill Protocol  Passed - 6/27/2020  3:12 AM        Passed - Visit with PCP or prescribing provider visit in past 12 months     Last office visit with prescriber/PCP: 1/14/2020 Leonora Dolan CNP OR same dept: 1/14/2020 Leonora Dolan CNP OR same specialty: 1/14/2020 Leonora Dolan CNP  Last physical: 7/11/2019 Last MTM visit: Visit date not found   Next visit within 3 mo: Visit date not found  Next physical within 3 mo: Visit date not found  Prescriber OR PCP: Leonora Dolan CNP  Last diagnosis associated with med order: 1. Hypertension  - hydroCHLOROthiazide (HYDRODIURIL) 25 MG tablet [Pharmacy Med Name: HYDROCHLOROTHIAZIDE 25MG TABLETS]; TAKE 1 TABLET(25 MG) BY MOUTH DAILY  Dispense: 90 tablet; Refill: 2    2. Mild episode of recurrent major depressive disorder (H)  - FLUoxetine (PROZAC) 10 MG capsule [Pharmacy Med Name: FLUOXETINE 10MG CAPSULES]; TAKE 1 TABLET(10 MG) BY MOUTH DAILY  Dispense: 90 capsule; Refill: 1    If protocol passes may refill for 12 months if within 3 months of last provider visit (or a total of 15 months).             Passed - Serum Potassium in past 12 months      Lab Results   Component Value Date    Potassium 4.1 08/05/2019             Passed - Serum Sodium in past 12 months      Lab Results   Component Value Date    Sodium 140 08/05/2019             Passed - Blood pressure on file in past 12 months     BP Readings from Last 1 Encounters:   01/14/20 112/74             Passed - Serum Creatinine in past 12 months      Creatinine   Date Value Ref Range Status    08/05/2019 0.74 0.60 - 1.10 mg/dL Final                FLUoxetine (PROZAC) 10 MG capsule [Pharmacy Med Name: FLUOXETINE 10MG CAPSULES] 90 capsule 1     Sig: TAKE 1 TABLET(10 MG) BY MOUTH DAILY       SSRI Refill Protocol  Passed - 6/27/2020  3:12 AM        Passed - PCP or prescribing provider visit in last year     Last office visit with prescriber/PCP: 1/14/2020 Leonora Dolan CNP OR same dept: 1/14/2020 Leonora Dolan CNP OR same specialty: 1/14/2020 Leonora Dolan CNP  Last physical: 7/11/2019 Last MTM visit: Visit date not found   Next visit within 3 mo: Visit date not found  Next physical within 3 mo: Visit date not found  Prescriber OR PCP: Leonora Dolan CNP  Last diagnosis associated with med order: 1. Hypertension  - hydroCHLOROthiazide (HYDRODIURIL) 25 MG tablet [Pharmacy Med Name: HYDROCHLOROTHIAZIDE 25MG TABLETS]; TAKE 1 TABLET(25 MG) BY MOUTH DAILY  Dispense: 90 tablet; Refill: 2    2. Mild episode of recurrent major depressive disorder (H)  - FLUoxetine (PROZAC) 10 MG capsule [Pharmacy Med Name: FLUOXETINE 10MG CAPSULES]; TAKE 1 TABLET(10 MG) BY MOUTH DAILY  Dispense: 90 capsule; Refill: 1    If protocol passes may refill for 12 months if within 3 months of last provider visit (or a total of 15 months).

## 2021-06-10 NOTE — TELEPHONE ENCOUNTER
RN cannot approve Refill Request    RN can NOT refill this medication med is not covered by policy/route to provider     . Last office visit: 1/14/2020 Leonora Dolan CNP Last Physical: 7/11/2019 Last MTM visit: Visit date not found Last visit same specialty: 1/14/2020 Leonora Dolan CNP.  Next visit within 3 mo: Visit date not found  Next physical within 3 mo: Visit date not found      Marjorie Wise, [facility] Triage/Med Refill 8/7/2020    Requested Prescriptions   Pending Prescriptions Disp Refills     ciclopirox (PENLAC) 8 % solution [Pharmacy Med Name: CICLOPIROX 8% NAIL LACQUER TOP SOLN] 6.6 mL 3     Sig: APPLY OVER NAIL AND SURROUNDING SKIN. APPLY EVERY DAY OVER PREVIOUS COAT. MAY REMOVE WITH ALCOHOL AFTER 7 DAYS AND REPEAT       There is no refill protocol information for this order

## 2021-06-11 NOTE — PROGRESS NOTES
Assessment and Plan:    1. Routine general medical examination at a health care facility  Discussed consuming a healthy diet and exercising.  Discussed adequate calcium and vitamin D intake.  She is up-to-date on mammogram and colonoscopy.  She is up-to-date on vaccinations.  Patient  - Glucose  - Lipid Cascade  - Hemoglobin    2. Rheumatoid arthritis involving multiple sites, unspecified rheumatoid factor presence  States she has been diagnosed with this in the past.  Will refer to rheumatology for further evaluation due to ongoing polyarthropathy.  - Ambulatory referral to Rheumatology    3. Obstructive Sleep Apnea  Patient continues CPAP machine.    4. Essential hypertension with goal blood pressure less than 140/90  Patient discontinued her hydrochlorothiazide.  Blood pressure is well controlled today.  Will discontinue hydrochlorothiazide and continue to monitor.    5. Mixed hyperlipidemia  Patient discontinued her atorvastatin on her own.  Will notify patient of results.    6. Obesity  The following high BMI interventions were performed this visit: exercise promotion: strength training, exercise promotion: stretching and nutrition therapy    7. Smoking  I have counseled the patient for tobacco cessation and the follow up will occur  at the next visit.  She declines smoking cessation treatment options.     Subjective:     Rochelle is a 56 y.o. female presenting to the clinic for a female physical.     LMP: 11 years ago   Hx of abnormal pap smear: none  Last pap smear: 6/6/16 normal, negative HPV   Perform self-breast exams: yes   Vaginal discharge or irritation: none   Sexually active: no   Contraception: none  Concerns for STDs: none  Previous pregnancies:none    Patient has hypertension and was taking hydrochlorothiazide.  Patient states she has not been taking the medication.  She has been checking her blood pressure has been normal.  She was taking atorvastatin for hyperlipidemia but has not taken it for  multiple months.  States she does have some medication at home.  Last cholesterol check was on 6/6/16 with a total cholesterol 135, triglycerides 139, HDL 36, LDL 71.  She is trying to consume a healthy diet.  She denies any exercise due to osteo and rheumatoid arthritis.  Patient states she has been diagnosed with rheumatoid arthritis in the past.  Unfortunately, I do not have this on record.  Patient requests [employer] mobility form.  States she cannot walk further than 3 blocks without having to stop due to the pain within her knees and ankles.    Review of systems:  I performed a 10 point review of systems.  All pertinent positives and negatives are noted in the HPI. All others are negative.     Allergies   Allergen Reactions     Penicillins        Current Outpatient Prescriptions on File Prior to Visit   Medication Sig Dispense Refill     atorvastatin (LIPITOR) 40 MG tablet Take 1 tablet (40 mg total) by mouth bedtime. 90 tablet 2     diclofenac (CATAFLAM) 50 MG tablet Take 1 tablet (50 mg total) by mouth 3 (three) times a day as needed. 60 tablet 1     hydrochlorothiazide (HYDRODIURIL) 25 MG tablet TAKE 1 TABLET BY MOUTH EVERY DAY 90 tablet 0     No current facility-administered medications on file prior to visit.        Social History     Social History     Marital status: Single     Spouse name: N/A     Number of children: N/A     Years of education: N/A     Occupational History     Not on file.     Social History Main Topics     Smoking status: Current Every Day Smoker     Packs/day: 0.50     Smokeless tobacco: Never Used     Alcohol use No     Drug use: No     Sexual activity: No     Other Topics Concern     Not on file     Social History Narrative       Past Medical History:   Diagnosis Date     Anxiety      Depression      Hyperlipidemia      Hypertension      Painful swelling of joint      Rheumatoid arthritis      Sleep apnea     Using CPAP machine       Family History   Problem Relation Age of Onset      Breast cancer Mother 49     Hypertension Father      Dementia Father      No Medical Problems Sister      No Medical Problems Daughter      No Medical Problems Maternal Grandmother      No Medical Problems Maternal Grandfather      Diabetes Paternal Grandmother      No Medical Problems Paternal Grandfather      No Medical Problems Maternal Aunt      No Medical Problems Paternal Aunt      BRCA 1/2 Neg Hx      Colon cancer Neg Hx      Endometrial cancer Neg Hx      Ovarian cancer Neg Hx        Past Surgical History:   Procedure Laterality Date     ANKLE SURGERY Right      HERNIA REPAIR       LUMBAR FUSION  2006    Done at [facility] APPENDECTOMY      Description: Appendectomy;  Recorded: 09/23/2008;     SPINAL FUSION         Objective:     Vitals:    06/12/17 0758   BP: 122/70   Pulse: 79   SpO2: 93%       Patient is alert, no obvious distress.   Skin: Warm, dry.  Multiple areas of seborrheic keratosis present on her trunk.   HEENT:  Eyes normal.  Ears normal.  Nose patent, mucosa pink.  Oropharynx mucosa pink, no lesions or tonsil enlargement.   Neck:  Supple, without lymphadenopathy, bruits, JVD. Thyroid normal texture and size.    Lungs:  Clear to auscultation.  No wheezing, rales noted.  Respirations even and unlabored.   Heart:  Regular rate and rhythm.  No murmurs.   Breasts:  Normal.  No surrounding adenopathy.   Abdomen: Soft, nontender.  No organomegaly.  Bowel sounds normoactive.  No guarding or masses noted.   :  Deferred per patient.   Musculoskeletal:  Full ROM of extremities.  Muscle strength equal +5/5.   Neurological:  Cranial nerves 2-12 intact.

## 2021-06-12 NOTE — PROGRESS NOTES
ASSESSMENT AND PLAN:  Rochelle Garza 57 y.o. female is seen here on 08/02/17 for evaluation of widespread severe joint pains worse in her knees and hands bases of thumbs.  She has ample evidence of osteoarthritis.  X-rays of the hands and knees are reviewed, personally, findings tricompartmental space reduction in the knee joints, and the first CMC's.  She like to proceed off the various options are discussed including pain management orally, physical therapy, surgical intervention, local injections of steroids, to go ahead with corticosteroid injections was done under ultrasound guidance for her right first CMC, 20 mg of methylprednisolone and right knee 40 m of Kenalog under ultrasound guidance.  She had a brisk Marcaine effect.  Diagnoses and all orders for this visit:    Polyarthralgia  -     XR Hands Bilateral 3 or More VWS; Future; Expected date: 8/2/17  -     XR Knees Bilateral 1 Or 2 VWS; Future; Expected date: 8/2/17    Primary osteoarthritis involving multiple joints  -     XR Hands Bilateral 3 or More VWS; Future; Expected date: 8/2/17  -     XR Knees Bilateral 1 Or 2 VWS; Future; Expected date: 8/2/17  -     triamcinolone acetonide 40 mg/mL injection 40 mg (KENALOG-40); Inject 1 mL (40 mg total) into the joint once.    CMC DJD(carpometacarpal degenerative joint disease), localized primary, unspecified laterality  -     methylPREDNISolone acetate injection 20 mg (DEPO-MEDROL); Inject 0.5 mL (20 mg total) into the joint once.      HISTORY OF PRESENTING ILLNESS:  Rochelle Garza, 57 y.o., female is here for severe joint pains..  Joints affected include multiple joints, both knee(s) and Bases of the thumbs, multiple PIPs, low back. This has gone on for several months ago. Pain is described as sharp. It is when active and at night/ wakes from sleep at night.  His symptoms are moderate, severe. The symptoms are gradually worsening. Symptoms include pain, tenderness to touch, limited range of motion.  Treatment to date  has been without significant relief.  She noted the pain level was at a lower level for the past several years as far back as 8 years.  In the past she has had injection in her back and knees with good benefit.  She had physical therapy for both.  She noted the pain level is such that it interferes with a variety of day-to-day activities.  She noted that there is very little if any morning stiffness.  She has noted generalized weakness, no rash, mouth ulcers, photosensitivity, no swelling of the joints.  She has not had psoriasis herself or the family or ulcerative colitis Crohn's disease.  She had surgery in her right ankle that continues to trouble her she has use braces.  She is a smoker.  She does not take alcohol.  Further historical information and ADL limitations as noted in the multidimensional health assessment questionnaire attached in the EMR. Rest of the 13 system ROS is negative.     ALLERGIES:Penicillins    PAST MEDICAL/ACTIVE PROBLEMS/MEDICATION/ FAMILY HISTORY/SOCIAL DATA:  The patient has a family history of  Past Medical History:   Diagnosis Date     Anxiety      Depression      Hyperlipidemia      Hypertension      Painful swelling of joint      Rheumatoid arthritis      Sleep apnea     Using CPAP machine     History   Smoking Status     Current Every Day Smoker     Packs/day: 0.50   Smokeless Tobacco     Never Used     Patient Active Problem List   Diagnosis     Osteoarthrosis     Rosacea     Insomnia     Edema     Allergies     Morbid Obesity     Major Depression, Recurrent     Obstructive Sleep Apnea     Hypertension     Diverticulitis Of Colon     Tremor     Rheumatoid arthritis     Mixed hyperlipidemia     Current Outpatient Prescriptions   Medication Sig Dispense Refill     diclofenac (CATAFLAM) 50 MG tablet Take 1 tablet (50 mg total) by mouth 3 (three) times a day as needed. 60 tablet 1     No current facility-administered medications for this visit.        COMPREHENSIVE  "EXAMINATION:  Vitals:    08/02/17 0846   BP: 130/70   Patient Site: Left Arm   Patient Position: Sitting   Cuff Size: Adult Regular   Pulse: 64   Weight: (!) 240 lb 3.2 oz (109 kg)   Height: 5' 2.5\" (1.588 m)     A well appearing alert oriented female. Vital data as noted above. Her eyes without inflammation/scleromalacia. ENTwithout oral mucositis, thrush, nasal deformity, external ear redness, deformity. Her neck is without lymphadenopathy and supple. Lungs normal sounds, no pleural rub. Heart auscultation normal rate, rhythm; no pericardial rub and murmurs. Abdomen soft, non tender, no organomegaly. Skin examined for heliotrope, malar area eruption, lupus pernio, periungual erythema, sclerodactyly, papules, erythema nodosum, purpura, nail pitting, onycholysis, and obvious psoriasis lesion. Neurological examination shows normal alertness, speech, facial symmetry, tone and power in upper and lower extremities, Tinel's and Phalen's at wrist and gait. The joint examination is performed for swelling, tenderness, warmth, erythema, and range of motion in the following joints: DIPs, PIPs, MCPs, wrists, first CMC's, elbows, shoulders, hips, knees, ankles, feet; spine for range of motion and paraspinal muscles for tenderness. The salient normal / abnormal findings are appended.  She has marked tenderness of the first CMC with crepitation, Heberden nodes, JLT of both the knees.  She does not have synovitis in any of the palpable appendicular joints.    LAB / IMAGING DATA:  ALT   Date Value Ref Range Status   05/27/2015 36 0 - 45 U/L Final   11/22/2013 28 <46 IU/L Final   09/07/2012 27 <46 IU/L Final     Albumin   Date Value Ref Range Status   05/27/2015 4.0 3.5 - 5.0 g/dL Final   11/22/2013 3.9 3.5 - 5.0 g/dL Final   09/07/2012 3.9 3.5 - 5.0 g/dL Final     Creatinine   Date Value Ref Range Status   06/06/2016 0.73 0.60 - 1.10 mg/dL Final   05/27/2015 0.71 0.60 - 1.10 mg/dL Final   11/22/2013 0.77 0.60 - 1.10 mg/dL Final "       WBC   Date Value Ref Range Status   10/07/2010 5.3 4.0 - 11.0 thou/uL Final     Hemoglobin   Date Value Ref Range Status   06/12/2017 15.8 12.0 - 16.0 g/dL Final   06/06/2016 16.5 (H) 12.0 - 16.0 g/dL Final   11/22/2013 15.4 12.0 - 16.0 g/dL Final     Platelets   Date Value Ref Range Status   10/07/2010 188 140 - 440 thou/uL Final       No results found for: NATY, RF, SEDRATE

## 2021-06-13 NOTE — PROGRESS NOTES
"Assessment: Onychocryptosis, medial border of the left great toenail.    Plan: Local anesthesia was given, consisting of 3 ml of 2% lidocaine plain. The left great toe was prepped with betadine. A tourniquet was fixed at the base of the left great toe to maintain local hemostasis. The offending nail border was freed from surrounding soft tissues and excised. The nail root was treated with phenol for 30 seconds x3 applications. The site was flushed with alcohol and dressed with bacitracin, Telfa and gauze. The tourniquet was removed and good perfusion was restored to the toe. The patient was given written and verbal post-procedure instructions. Follow up here as needed.        Subjective: New patient visit to the [facility] with ingrown toenail troubles.  The medial border of the left great toenail has been painful over the past several months.  Trimming provides temporary relief, but symptoms quickly return. She is ready to proceed with permanent partial nail removal.  She has noticed discoloration and thickening in the neighboring second toenail.  Concerns about fungus.  No prior treatment.  History of partial nail avulsion with chemical matrixectomy of the lateral border of the left great toenail in 2011.  History of right hallux total nail avulsion without matrixectomy in 2014.    Physical Exam:  /80  Pulse 72  Resp 16  Ht 5' 2.5\" (1.588 m)  Wt (!) 240 lb (108.9 kg)  SpO2 99%  BMI 43.2 kg/m2  General: Pleasant 57 y.o. female in no acute distress.  Vascular: DP pulses are palpable. PT pulses are palpable. Pedal hair is present. Feet are warm to the touch.  Cardiac: Pulse is regular.  Lymphatic: No edema at the ankles.  Neuro: Sensation in the feet is grossly intact to light touch.  Derm: Ingrown nail with surrounding redness and swelling.  Musculoskeletal: Adequate passive range of motion in foot and ankle joints.      Medical History:   has a past medical history of Anxiety; Depression; " Hyperlipidemia; Hypertension; Ingrown toenail; Onychomycosis; Painful swelling of joint; Rheumatoid arthritis; and Sleep apnea.    Surgical History:   has a past surgical history that includes appendectomy; Spinal fusion; Lumbar fusion (2006); Ankle surgery (Right); and Hernia repair.    Allergies:  Allergies   Allergen Reactions     Penicillins        Medications:    Current Outpatient Prescriptions:      fluticasone (FLONASE) 50 mcg/actuation nasal spray, SHAKE WELL AND USE 2 SPRAYS IN EACH NOSTRIL DAILY FOR 10 DAYS, Disp: 48 g, Rfl: 3     FLUZONE QUAD [id number] 60 mcg (15 mcg x 4)/0.5 mL Susp injection, , Disp: , Rfl: 0    Family History:  family history includes Breast cancer (age of onset: 49) in her mother; Dementia in her father; Diabetes in her paternal grandmother; Hypertension in her father; No Medical Problems in her daughter, maternal aunt, maternal grandfather, maternal grandmother, paternal aunt, paternal grandfather, and sister. There is no history of BRCA 1/2, Colon cancer, Endometrial cancer, or Ovarian cancer.    Social History:  Reviewed, and she reports that she has been smoking.  She has been smoking about 0.50 packs per day. She has never used smokeless tobacco. She reports that she does not drink alcohol or use illicit drugs.    Review of Systems:  A 12 point comprehensive review of systems was negative except as noted.

## 2021-06-13 NOTE — PROGRESS NOTES
ASSESSMENT AND PLAN:  Rochelle Garza 57 y.o. female is seen here on 11/06/17 for follow-up of osteoarthritis widespread including mostly painful and her first CMC's and knees, with good response to corticosteroid injections done in August.  She is aware of the option of taking acetaminophen over-the-counter.  She is aware of the options besides that for management of osteoarthritis.  There is little to suggest inflammatory arthropathy is going on here.  Her x-rays discussed with her also confirmed the is clinical suspicion of osteoarthritis of the first CMC is in the knees.  Offered her a duloxetine, she would like to defer.  She will let us know the phone within the next few days if she so desires.  She is to return for follow-up here in 6 months or sooner.    Diagnoses and all orders for this visit:    Primary osteoarthritis involving multiple joints    Polyarthralgia    Localized primary osteoarthrosis of carpometacarpal joint of left wrist    Lumbar spondylosis      HISTORY OF PRESENTING ILLNESS:  Rochelle Garza, 57 y.o., female is here for follow-up of osteoarthritis associated polyarthralgias.  She is done so much better after the right first CMC in the right knee injection.  She noted her pain level to be 2.0/10.  She still has difficulty performing some of the day-to-day activities.  She has noted more pain in her left first CMC area and also the left knee.  She noted left hip pain to be moderately severe.  In fact is a left lower back area pain not trochanteric of the groin region pain.  This is worse with lateral part of the day.  This is associated with no stiffness in the mornings in any of the joints.  She noted no fever or weight loss blurry vision eye redness mouth also nausea.  There is no rash.  She had physical therapy for both.  She noted the pain level is such that it interferes with a variety of day-to-day activities.  There is no radiation down the lower extremities no numbness tingling in either side.   She has had surgery in the lumbar spine.  Lumbar spine MRI reports of 2015 were reviewed.  She noted that there is very little if any morning stiffness.  She has noted generalized weakness, no rash, mouth ulcers, photosensitivity, no swelling of the joints.  She has not had psoriasis herself or the family or ulcerative colitis Crohn's disease.  She had surgery in her right ankle that continues to trouble her she has use braces.  She is a smoker.  She does not take alcohol.  Further historical information and ADL limitations as noted in the multidimensional health assessment questionnaire attached in the EMR. Rest of the 13 system ROS is negative.     ALLERGIES:Penicillins    PAST MEDICAL/ACTIVE PROBLEMS/MEDICATION/ FAMILY HISTORY/SOCIAL DATA:  The patient has a family history of  Past Medical History:   Diagnosis Date     Anxiety      Depression      Hyperlipidemia      Hypertension      Ingrown toenail      Onychomycosis      Painful swelling of joint      Rheumatoid arthritis      Sleep apnea     Using CPAP machine     History   Smoking Status     Current Every Day Smoker     Packs/day: 0.50   Smokeless Tobacco     Never Used     Patient Active Problem List   Diagnosis     Osteoarthrosis     Rosacea     Insomnia     Edema     Allergies     Morbid Obesity     Major Depression, Recurrent     Obstructive Sleep Apnea     Hypertension     Diverticulitis Of Colon     Tremor     Mixed hyperlipidemia     Polyarthralgia     Primary osteoarthritis involving multiple joints     CMC DJD(carpometacarpal degenerative joint disease), localized primary     Current Outpatient Prescriptions   Medication Sig Dispense Refill     fluticasone (FLONASE) 50 mcg/actuation nasal spray SHAKE WELL AND USE 2 SPRAYS IN EACH NOSTRIL DAILY FOR 10 DAYS 48 g 3     FLUZONE QUAD 3546-3956 60 mcg (15 mcg x 4)/0.5 mL Susp injection   0     No current facility-administered medications for this visit.      DETAILED EXAMINATION  11/06/17  :  Vitals:     "11/06/17 1017   BP: 122/76   Pulse: 88   Weight: (!) 240 lb (108.9 kg)   Height: 5' 2.5\" (1.588 m)     Alert oriented. Head including the face is examined for malar rash, heliotropes, scarring, lupus pernio. Eyes examined for redness such as in episcleritis/scleritis, periorbital lesions.   Neck examined  for lymph nodes, range of motion Both upper and lower extremities (all four) examined for swollen, warm &/or  tender joints, range of motion, rash, muscle weakness, edema. The salient normal / abnormal findings are appended.  She has [place] nodes.  She has tenderness of the left first CMC more so than the right, JLT of the knee left side.  There is no synovitis in any of the palpable appendicular joints.  Paraspinal tenderness.  Intact tone power in lower extremities.  Gait is normal.    LAB / IMAGING DATA:  ALT   Date Value Ref Range Status   05/27/2015 36 0 - 45 U/L Final   11/22/2013 28 <46 IU/L Final   09/07/2012 27 <46 IU/L Final     Albumin   Date Value Ref Range Status   05/27/2015 4.0 3.5 - 5.0 g/dL Final   11/22/2013 3.9 3.5 - 5.0 g/dL Final   09/07/2012 3.9 3.5 - 5.0 g/dL Final     Creatinine   Date Value Ref Range Status   06/06/2016 0.73 0.60 - 1.10 mg/dL Final   05/27/2015 0.71 0.60 - 1.10 mg/dL Final   11/22/2013 0.77 0.60 - 1.10 mg/dL Final       WBC   Date Value Ref Range Status   10/07/2010 5.3 4.0 - 11.0 thou/uL Final     Hemoglobin   Date Value Ref Range Status   06/12/2017 15.8 12.0 - 16.0 g/dL Final   06/06/2016 16.5 (H) 12.0 - 16.0 g/dL Final   11/22/2013 15.4 12.0 - 16.0 g/dL Final     Platelets   Date Value Ref Range Status   10/07/2010 188 140 - 440 thou/uL Final       No results found for: NATY, RF, SEDRATE     "

## 2021-06-13 NOTE — TELEPHONE ENCOUNTER
Received refill request from [facility] for hydrochlorothiazide.  Patient was last seen by PCP January 2020, therefore we will send a 1 month supply.

## 2021-06-13 NOTE — TELEPHONE ENCOUNTER
90 day prescription request from [facility] for- Hydrochlorothiazide 25 mg.    Looks like patient is requesting a 90 day supply instead of 30.     Please authorize this request or not.

## 2021-06-13 NOTE — TELEPHONE ENCOUNTER
RN cannot approve Refill Request    RN can NOT refill this medication Protocol failed and NO refill given. Last office visit: 1/14/2020 Leonora Dolan CNP Last Physical: 7/11/2019 Last MTM visit: Visit date not found Last visit same specialty: 1/14/2020 Leonora Dolan CNP.  Next visit within 3 mo: Visit date not found  Next physical within 3 mo: Visit date not found      Marjorie Wise, [facility] Triage/Med Refill 12/9/2020    Requested Prescriptions   Pending Prescriptions Disp Refills     hydroCHLOROthiazide (HYDRODIURIL) 25 MG tablet 30 tablet 0     Sig: Take 1 tablet (25 mg total) by mouth daily.       Diuretics/Combination Diuretics Refill Protocol  Failed - 12/8/2020 12:19 PM        Failed - Serum Potassium in past 12 months      No results found for: LN-POTASSIUM          Failed - Serum Sodium in past 12 months      No results found for: LN-SODIUM          Failed - Serum Creatinine in past 12 months      Creatinine   Date Value Ref Range Status   08/05/2019 0.74 0.60 - 1.10 mg/dL Final             Passed - Visit with PCP or prescribing provider visit in past 12 months     Last office visit with prescriber/PCP: 1/14/2020 Leonora Dolan CNP OR same dept: 1/14/2020 Leonora Dolan CNP OR same specialty: 1/14/2020 Leonora Dolan CNP  Last physical: 7/11/2019 Last MTM visit: Visit date not found   Next visit within 3 mo: Visit date not found  Next physical within 3 mo: Visit date not found  Prescriber OR PCP: Leonora Dolan CNP  Last diagnosis associated with med order: 1. Hypertension  - hydroCHLOROthiazide (HYDRODIURIL) 25 MG tablet; Take 1 tablet (25 mg total) by mouth daily.  Dispense: 30 tablet; Refill: 0    If protocol passes may refill for 12 months if within 3 months of last provider visit (or a total of 15 months).             Passed - Blood pressure on file in past 12 months     BP Readings from Last 1 Encounters:   01/14/20 112/74

## 2021-06-14 NOTE — PROGRESS NOTES
Assessment and Plan:     Patient has been advised of split billing requirements and indicates understanding: Yes  1. Routine general medical examination at a health care facility  Recommend consuming a healthy diet and exercising.  She declines HIV and hepatitis C screening.  Provided influenza vaccine.  She is otherwise up-to-date on mammogram and colon cancer screening.  - Influenza, Recombinant, Inj, Quadrivalent, PF, 18+YRS    2. Hypertension  This is controlled with hydrochlorothiazide.  - hydroCHLOROthiazide (HYDRODIURIL) 25 MG tablet; Take 1 tablet (25 mg total) by mouth daily.  Dispense: 90 tablet; Refill: 2    3. Mixed hyperlipidemia  Patient continues atorvastatin.  We will check lipid cascade results.  - Lipid Cascade  - atorvastatin (LIPITOR) 20 MG tablet; Take 1 tablet (20 mg total) by mouth daily.  Dispense: 90 tablet; Refill: 2    4. Mild episode of recurrent major depressive disorder (H)  Obtained PHQ 9 score of 2.  She continues fluoxetine.  - FLUoxetine (PROZAC) 10 MG capsule; Take 1 capsule (10 mg total) by mouth daily.  Dispense: 90 capsule; Refill: 2    5. Morbid Obesity  Discussed weight loss goals.    6. Obstructive sleep apnea  She continues to use CPAP.    7. Medication management  - HM2(CBC w/o Differential)  - Comprehensive Metabolic Panel  - Urinalysis-UC if Indicated    8. Encounter for immunization   - Influenza, Recombinant, Inj, Quadrivalent, PF, 18+YRS    9.  Smoking  Discussed smoking cessation options, but she declines.    I enjoyed my visit with Rochelle and look forward to seeing her as needed in the future.        The patient's current medical problems were reviewed.    The following health maintenance schedule was reviewed with the patient and provided in printed form in the after visit summary:   Health Maintenance   Topic Date Due     DEPRESSION ACTION PLAN  1960     HEPATITIS C SCREENING  1960     HIV SCREENING  07/20/1974     ADVANCE CARE PLANNING  06/05/2011      PAP SMEAR  06/06/2021     HPV TEST  06/06/2021     MAMMOGRAM  06/21/2021     MEDICARE ANNUAL WELLNESS VISIT  01/08/2022     COLORECTAL CANCER SCREENING  09/13/2022     LIPID  01/08/2026     TD 18+ HE  06/06/2026     Pneumococcal Vaccine: Pediatrics (0 to 5 Years) and At-Risk Patients (6 to 64 Years)  Completed     INFLUENZA VACCINE RULE BASED  Completed     TDAP ADULT ONE TIME DOSE  Completed     ZOSTER VACCINES  Completed     HEPATITIS B VACCINES  Aged Out        Subjective:   Chief Complaint: Rochelle Garza is an 60 y.o. female here for an Annual Wellness visit.   HPI: Patient is single and is not sexually.  She has not been sexually active in the past.  She has no concerns for STDs.  Last Pap smear was on 6/6/2016 and was normal.  Patient has had difficulty tolerating Pap smears and declines a Pap smear today.  She has multiple comorbidities including depression, obesity, NADIA, hypertension, hyperlipidemia, lumbar spondylosis.  Patient is currently taking hydrochlorothiazide for hypertension which is well controlled.  She is taking atorvastatin 20 mg daily for hyperlipidemia.  Last cholesterol check was on 1/14/2020 with a total cholesterol 134, triglycerides 204, HDL 35, LDL 58.  She takes fluoxetine for depression which is well controlled.  She denies thoughts of suicide.  She feels well supported.  She was able to see her brother and sister on [place].  She has NADIA and uses a CPAP.  Overall, she feels well today and has no other concerns.    Review of Systems:  Please see above.  The rest of the review of systems are negative for all systems.    Patient Care Team:  Leonora Dolan CNP as PCP - General  Leonora Dolan CNP as Assigned PCP  Neftali Troy DPM as Assigned Musculoskeletal Provider  Precious Castillo, PharmD as Pharmacist (Pharmacist)     Patient Active Problem List   Diagnosis     Osteoarthrosis     Rosacea     Insomnia     Edema     Allergies     Morbid Obesity     Major Depression, Recurrent      Obstructive sleep apnea     Hypertension     Diverticulitis Of Colon     Abnormal involuntary movement     Mixed hyperlipidemia     Polyarthralgia     Primary osteoarthritis involving multiple joints     CMC DJD(carpometacarpal degenerative joint disease), localized primary     Lumbar spondylosis     Past Medical History:   Diagnosis Date     Anxiety      Depression      Hyperlipidemia      Hypertension      Ingrown toenail      Onychomycosis      Painful swelling of joint      Rheumatoid arthritis (H)      Sleep apnea     Using CPAP machine      Past Surgical History:   Procedure Laterality Date     ANKLE SURGERY Right      HERNIA REPAIR       LUMBAR FUSION  2006    Done at [facility] APPENDECTOMY      Description: Appendectomy;  Recorded: 09/23/2008;     SPINAL FUSION        Family History   Problem Relation Age of Onset     Breast cancer Mother 49     Hypertension Father      Dementia Father      No Medical Problems Sister      No Medical Problems Daughter      No Medical Problems Maternal Grandmother      No Medical Problems Maternal Grandfather      Diabetes Paternal Grandmother      No Medical Problems Paternal Grandfather      No Medical Problems Maternal Aunt      No Medical Problems Paternal Aunt      BRCA 1/2 Neg Hx      Colon cancer Neg Hx      Endometrial cancer Neg Hx      Ovarian cancer Neg Hx       Social History     Socioeconomic History     Marital status: Single     Spouse name: Not on file     Number of children: Not on file     Years of education: Not on file     Highest education level: Not on file   Occupational History     Not on file   Social Needs     Financial resource strain: Not on file     Food insecurity     Worry: Not on file     Inability: Not on file     Transportation needs     Medical: Not on file     Non-medical: Not on file   Tobacco Use     Smoking status: Current Every Day Smoker     Packs/day: 0.50     Smokeless tobacco: Never Used   Substance and Sexual Activity      "Alcohol use: No     Drug use: No     Sexual activity: Never   Lifestyle     Physical activity     Days per week: Not on file     Minutes per session: Not on file     Stress: Not on file   Relationships     Social connections     Talks on phone: Not on file     Gets together: Not on file     Attends Sikh service: Not on file     Active member of club or organization: Not on file     Attends meetings of clubs or organizations: Not on file     Relationship status: Not on file     Intimate partner violence     Fear of current or ex partner: Not on file     Emotionally abused: Not on file     Physically abused: Not on file     Forced sexual activity: Not on file   Other Topics Concern     Not on file   Social History Narrative     Not on file      Current Outpatient Medications   Medication Sig Dispense Refill     atorvastatin (LIPITOR) 20 MG tablet Take 1 tablet (20 mg total) by mouth daily. 90 tablet 2     FLUoxetine (PROZAC) 10 MG capsule Take 1 capsule (10 mg total) by mouth daily. 90 capsule 2     fluticasone (FLONASE) 50 mcg/actuation nasal spray SHAKE WELL AND USE 2 SPRAYS IN EACH NOSTRIL DAILY FOR 10 DAYS 48 g 3     hydroCHLOROthiazide (HYDRODIURIL) 25 MG tablet Take 1 tablet (25 mg total) by mouth daily. 90 tablet 2     ibuprofen (ADVIL,MOTRIN) 600 MG tablet TAKE 1 TABLET BY MOUTH EVERY 6 HOURS AS NEEDED FOR PAIN 30 tablet 0     No current facility-administered medications for this visit.       Objective:   Vital Signs:   Visit Vitals  /90 (Patient Site: Right Arm, Patient Position: Sitting, Cuff Size: Adult Large)   Pulse 85   Ht 5' 4.57\" (1.64 m)   Wt (!) 251 lb 11.2 oz (114.2 kg)   SpO2 98%   BMI 42.45 kg/m           VisionScreening:  No exam data present     PHYSICAL EXAM  General appearance: alert, appears stated age and cooperative  Head: Normocephalic, without obvious abnormality, atraumatic  Eyes: negative  Ears: normal TM's and external ear canals both ears  Nose: Nares normal. Septum " midline. Mucosa normal. No drainage or sinus tenderness.  Throat: lips, mucosa, and tongue normal; teeth and gums normal  Neck: no adenopathy, no carotid bruit, no JVD, supple, symmetrical, trachea midline and thyroid not enlarged, symmetric, no tenderness/mass/nodules  Lungs: clear to auscultation bilaterally  Breasts: normal appearance, no masses or tenderness  Heart: regular rate and rhythm, S1, S2 normal, no murmur, click, rub or gallop  Abdomen: soft, non-tender; bowel sounds normal; no masses,  no organomegaly  Pelvic: deferred per patient   Extremities: extremities normal, atraumatic, no cyanosis or edema  Skin: Skin color, texture, turgor normal. No rashes or lesions  Neurologic: Grossly normal      Assessment Results 1/8/2021   Activities of Daily Living No help needed   Instrumental Activities of Daily Living No help needed   Get Up and Go Score -   Mini Cog Total Score 4   Some recent data might be hidden     A Mini-Cog score of 0-2 suggests the possibility of dementia, score of 3-5 suggests no dementia    Identified Health Risks:     She is at risk for lack of exercise and has been provided with information to increase physical activity for the benefit of her well-being.  The patient was provided with written information regarding signs of hearing loss.  Information regarding advance directives (living hui), including where she can download the appropriate form, was provided to the patient via the AVS.

## 2021-06-15 NOTE — PROGRESS NOTES
Assessment and Plan:     1. Acute non-recurrent maxillary sinusitis  Will treat with Doxycycline.  Educated on its indications and side effects.  She is instructed to avoid sun exposure.  Will treat cough with Tessalon Perles to use as needed.  Discussed symptomatic treatment including OTC nasal saline sprays.  If no improvement with symptoms, the patient is to follow-up.  The patient is content with the plan.   - doxycycline (VIBRA-TABS) 100 MG tablet; Take 1 tablet (100 mg total) by mouth 2 (two) times a day for 10 days.  Dispense: 20 tablet; Refill: 0  - benzonatate (TESSALON) 200 MG capsule; Take 1 capsule (200 mg total) by mouth 3 (three) times a day as needed for cough.  Dispense: 20 capsule; Refill: 0    Subjective:     Rochelle is a 57 y.o. female presenting to the clinic for concerns for cold symptoms for 10 days.  She complains of sinus congestion with purulent drainage, facial pain and pressure, productive cough, headache.  She denies stomachache, nausea, vomiting, fever, shortness of breath, chest tightness, wheezing.  She has been taking over-the-counter DayQuil.  She states that multiple people living in her building have been ill recently.  She feels as though her symptoms are worsening.    Review of Systems: A complete 14 point review of systems was obtained and is negative or as stated in the history of present illness.    Social History     Social History     Marital status: Single     Spouse name: N/A     Number of children: N/A     Years of education: N/A     Occupational History     Not on file.     Social History Main Topics     Smoking status: Current Every Day Smoker     Packs/day: 0.50     Smokeless tobacco: Never Used     Alcohol use No     Drug use: No     Sexual activity: No     Other Topics Concern     Not on file     Social History Narrative       Active Ambulatory Problems     Diagnosis Date Noted     Osteoarthrosis      Rosacea      Insomnia      Edema      Allergies      Morbid Obesity   "    Major Depression, Recurrent      Obstructive Sleep Apnea      Hypertension      Diverticulitis Of Colon      Tremor      Mixed hyperlipidemia 06/12/2017     Polyarthralgia 08/02/2017     Primary osteoarthritis involving multiple joints 08/02/2017     CMC DJD(carpometacarpal degenerative joint disease), localized primary 08/02/2017     Lumbar spondylosis 11/06/2017     Resolved Ambulatory Problems     Diagnosis Date Noted     Dyspepsia      Hyperlipidemia      Rheumatoid arthritis 06/06/2016     Past Medical History:   Diagnosis Date     Anxiety      Depression      Hyperlipidemia      Hypertension      Ingrown toenail      Onychomycosis      Painful swelling of joint      Rheumatoid arthritis      Sleep apnea        Family History   Problem Relation Age of Onset     Breast cancer Mother 49     Hypertension Father      Dementia Father      No Medical Problems Sister      No Medical Problems Daughter      No Medical Problems Maternal Grandmother      No Medical Problems Maternal Grandfather      Diabetes Paternal Grandmother      No Medical Problems Paternal Grandfather      No Medical Problems Maternal Aunt      No Medical Problems Paternal Aunt      BRCA 1/2 Neg Hx      Colon cancer Neg Hx      Endometrial cancer Neg Hx      Ovarian cancer Neg Hx        Objective:     /82  Pulse 88  Ht 5' 2.5\" (1.588 m)  Wt (!) 245 lb 1.6 oz (111.2 kg)  SpO2 98%  BMI 44.11 kg/m2    Patient is alert, in no obvious distress.   Skin: Warm, dry.  No lesions or rashes.  Skin turgor rapid return.   HEENT:  Head normocephalic, atraumatic.  Eyes normal.  Ears normal.  Nose patent, mucosa red.  Oropharynx mucosa pink.  No lesions or tonsillar enlargement.   Sinuses:  Tenderness to palpation of bilateral maxillary sinuses.   Neck: Supple, no lymphadenopathy.   Lungs:  Clear to auscultation. Respirations even and unlabored.  No wheezing or rales noted.   Heart:  Regular rate and rhythm.  No murmurs.               "

## 2021-06-16 PROBLEM — M15.0 PRIMARY OSTEOARTHRITIS INVOLVING MULTIPLE JOINTS: Status: ACTIVE | Noted: 2017-08-02

## 2021-06-16 PROBLEM — M25.50 POLYARTHRALGIA: Status: ACTIVE | Noted: 2017-08-02

## 2021-06-16 PROBLEM — M19.049 CMC DJD(CARPOMETACARPAL DEGENERATIVE JOINT DISEASE), LOCALIZED PRIMARY: Status: ACTIVE | Noted: 2017-08-02

## 2021-06-16 PROBLEM — M47.816 LUMBAR SPONDYLOSIS: Status: ACTIVE | Noted: 2017-11-06

## 2021-06-16 PROBLEM — F17.200 SMOKING: Status: ACTIVE | Noted: 2021-01-08

## 2021-06-16 PROBLEM — E78.2 MIXED HYPERLIPIDEMIA: Status: ACTIVE | Noted: 2017-06-12

## 2021-06-16 NOTE — TELEPHONE ENCOUNTER
Refill Approved    Rx renewed per Medication Renewal Policy. Medication was last renewed on 1/10/21.    Virgilio Anton, [facility] Triage/Med Refill 4/5/2021     Requested Prescriptions   Pending Prescriptions Disp Refills     atorvastatin (LIPITOR) 40 MG tablet [Pharmacy Med Name: ATORVASTATIN 40MG TABLETS] 90 tablet 0     Sig: TAKE 1 TABLET(40 MG) BY MOUTH DAILY       Statins Refill Protocol (Hmg CoA Reductase Inhibitors) Passed - 4/4/2021  6:40 AM        Passed - PCP or prescribing provider visit in past 12 months      Last office visit with prescriber/PCP: 1/14/2020 Leonora Dolan CNP OR same dept: Visit date not found OR same specialty: 1/14/2020 Leonora Dolan CNP  Last physical: 1/8/2021 Last MTM visit: Visit date not found   Next visit within 3 mo: Visit date not found  Next physical within 3 mo: Visit date not found  Prescriber OR PCP: Leonora Dolan CNP  Last diagnosis associated with med order: 1. Mixed hyperlipidemia  - atorvastatin (LIPITOR) 40 MG tablet [Pharmacy Med Name: ATORVASTATIN 40MG TABLETS]; TAKE 1 TABLET(40 MG) BY MOUTH DAILY  Dispense: 90 tablet; Refill: 0    If protocol passes may refill for 12 months if within 3 months of last provider visit (or a total of 15 months).

## 2021-06-17 NOTE — PATIENT INSTRUCTIONS - HE
"Patient Instructions by Paulette Lorenzana LPN at 10/7/2019 11:00 AM     Author: Paulette Lorenzana LPN Service: -- Author Type: Licensed Nurse    Filed: 10/7/2019 10:45 AM Encounter Date: 10/7/2019 Status: Signed    : Paulette Lorenzana LPN (Licensed Nurse)       We are prescribing some compression stockings for you. I have included different suppliers that should help you get measured and fitting to ensure proper fitting socks. You should wear this socks as much as you can. It is especially important to wear them with long periods of sitting/standing, long car rides or if you will be flying. Compression socks should get refilled every 4-6 months. They do not need to be worn at night while in bed.    If you do a lot of standing it is good to do calf raises to help keep the blood pumping. If you sit a lot at work it is good to get up periodically to walk around. Elevation of the foot of your bed 4-6\" helps the blood return back to where it is needed.        Varicose Veins      Varicose veins are swollen, enlarged veins most often found in the legs. They are usually blue or purple in color and may bulge, twist, and stand out under the skin.  Normally, veins return blood from the body to the heart. The leg veins have one-way valves that prevent blood from flowing backward in the vein. When the valves are weak or damaged, blood backs up in the veins. This may cause some of the veins to swell and bulge and become varicose veins.  Symptoms  Varicose veins may or may not cause symptoms. If symptoms do occur, they can include:    Legs that feel tired, achy, heavy, or itchy    Leg muscle cramps    Skin changes, such as discoloration, dryness, redness, or rash (in more severe cases, you may also have sores on the skin called venous leg ulcers)  Risk Factors  There are a number of factors that increase the risk for varicose veins. These can include:    Being a woman    Being older    Sitting or standing for long " periods    Being overweight    Being pregnant    Having a family history of varicose veins  Treatment begins with simple self-help measures (see below). If these dont help, there are many procedures that can be done to shrink or remove varicose veins. Your healthcare provider can tell you more about these options, if needed.  Home care    Support or compression stockings will likely be prescribed. If so, be sure to wear them as directed. They may help improve blood flow.    Exercising helps strengthen your leg muscles and improve blood flow. To get the most benefit, choose exercises such as walking, swimming, or cycling. Also try to exercise for at least 30 minutes on most days.    Raising (elevating) your legs lets gravity help blood flow back to the heart. Sit or lie with your feet above heart level a few times throughout the day, or as directed.    Avoid long periods of sitting or standing. Change positions often. Also, move your ankles, toes and knees often. This may also help improve blood flow.    If you are overweight, talk with your healthcare provider about setting up a weight-loss plan. Maintaining a healthy weight can help reduce the strain on your veins. It may also improve symptoms, such as swelling and aching.    If you have dryness and itching, ask your provider about special lotions that can be applied to the skin to help improve symptoms.  Follow-up care  Follow up with your healthcare provider, or as directed. If imaging tests were done, youll be told the results and if there are any new findings that affect your care.  When to seek medical advice  Call your healthcare provider right away if any of these occur:    Sudden, severe leg swelling, pain, or redness    Symptoms worsen, or they dont improve with self-care    Bleeding from any affected veins    Ulcers form on the legs, ankles, or feet    Fever of 100.4 F (38 C) or higher, or as advised by your provider      Understanding Spider and Varicose  Veins  Do you often hide your legs because of the way they look? You may have noticed tiny red or blue bursts (spider veins). Or maybe you have veins that bulge or look twisted (varicose veins). If so, there are treatments that can help  What are the symptoms?  Spider veins or varicose veins may never be a problem. But sometimes they can cause legs to ache or swell. Your legs may also feel heavy and tired, or like theyre burning. These symptoms may be more severe at the end of the day. Prolonged sitting or standing can also make your symptoms worse.  Who gets spider and varicose veins?  Anyone can get spider or varicose veins. But vein problems tend to be hereditary (run in families). Other factors that can affect veins include:    Pregnancy, hormones, and birth control pills    A job where you stand or sit a lot    Extra weight or lack of exercise    Age         Spider veins look like tiny webs on the ankles, legs, and upper thighs.       Ropy, dark blue, red, or flesh-colored varicose veins are most common on the thighs, calves, and feet.    What can be done?  Spider and varicose veins can affect the way you feel about yourself. Talk to your healthcare provider about your concerns. There are treatments that can ease symptoms and make your legs look better.  Your treatment choices  Treatment may include self-care, sclerotherapy (injecting veins with a chemical), surgery, or newer nonsurgical minimally invasive therapies. Spider veins and some varicose veins can be treated with sclerotherapy. Large varicose veins can often be treated with newer minimally invasive procedures and, in rare cases, surgery may be needed.     Please call [facility] to schedule an appointment. If you received a prescription please bring it with you to your appointment. You may call one of the locations below, although some locations are limited to what they carry.    Office Locations      [facility]  [address]  [place] [address]    [place] [phone]  [phone]  [place] [phone]  [email or web address]      New Locations  [facility]  [address], [place] [address]  [facility] ([facility])  [address], [place] [address]  Phone [phone] /Fax [phone]        [place]   [address]   Medical [address]/Orthotics and Prosthetics [address]  [place] [address]   Phone: [phone]  Fax [phone]    [facility]  [address]  [place] [address]  Phone: [phone]  Fax: [phone]    [facility]   [address]  [place] [address]  Phone: [phone]  Fax: [phone]    [facility]  [address]  [place] [address]  Phone: [phone]  Fax: [phone]    [facility]  [address]  [place] [address]  Phone: [phone]  Fax: [phone]    [facility]  [address]   [place] [address]   Phone: [phone]  Fax: [phone]    [place]   [address] [place] [address]   Phone: [phone]  Fax: [phone]      [facility]   [address]             [address]                 [address]     [place] [address]      [place] [address]         [place] [address]  [phone]  [phone]     [phone]               Fax: [phone]  [email or web address]                                                     [facility]  [address] [place] [address]  [phone]  [phone]  [email or web address]    Debi Ernandez  [phone]  [email or web address]    [facility]   [phone]    [place] [address]  [place], [place] [address] [phone]

## 2021-06-17 NOTE — PATIENT INSTRUCTIONS - HE
Patient Instructions by Paulette Lorenzana LPN at 9/30/2019 11:00 AM     Author: Paulette Lorenzana LPN Service: -- Author Type: Licensed Nurse    Filed: 9/30/2019 11:28 AM Encounter Date: 9/30/2019 Status: Signed    : Paulette Lorenzana LPN (Licensed Nurse)       Patient Education     Peripheral Artery Disease (PAD)   Peripheral artery disease (PAD) occurs when the arteries that carry blood to the limbs are narrowed or blocked. This is usually due to a buildup of a fatty substance called plaque in the walls of the arteries.  PAD most often affects the arteries in the legs. When these arteries are narrowed or blocked, blood flow to the legs is reduced. This can cause leg and foot pain and other symptoms. If severe enough, reduced blood flow to the legs can lead to tissue death (gangrene) and the loss of a toe, foot, or leg. Having PAD also makes it more likely that arteries in other body areas are blocked. For instance, arteries that carry blood to the heart or brain may be affected. This raises the chances of heart attack and stroke.  Risk factors  Certain factors can make PAD more likely. They include:    Smoking    Diabetes    High blood pressure    Unhealthy cholesterol levels    Obesity    Inactive lifestyle    Older age    Family history of PAD  Symptoms  Many people with PAD have no symptoms. If symptoms do occur, they can include:    Pain in the muscles of the calves, thighs or hips that gets worse with activity and better with rest (intermittent claudication)    Achy, tired, or heavy feeling in the legs    Weakness, numbness, tingling, or loss of feeling in the legs    Changes in skin color of the legs    Sores on the legs and feet    Cold leg, feet, or toes    Pain the feet or toes even when lying down (rest pain)  Home care  PAD is a chronic (lifelong) condition. Treatment is focused on managing your condition and lowering your health risks. This may include doing the following:    If you smoke,  quit. This helps prevent further damage to your arteries and lowers your health risks. Ask your provider about medicines or products that can help you quit smoking. Also consider joining a stop-smoking program or support group.    Be more active. This helps you lose weight and manage problems such as high blood pressure and unhealthy cholesterol levels. Start a walking program if advised to by your provider. Your provider may also help you form a safe exercise program that is right for your needs.    Make healthy eating changes. This includes eating less fat, salt, and sugar.    Take medicines for high blood pressure, unhealthy cholesterol levels, and diabetes as directed.    Have your blood pressure and cholesterol levels checked as often as directed.    If you have diabetes, try to keep your blood sugar well controlled. Test your blood sugar as directed.    If you are overweight, talk to your provider about forming a weight-loss plan.    Watch for cuts, scrapes, or open sores on your feet. Poor blood flow to the feet may slow healing and increase the risk of infection from these problems.   Follow-up care  Follow up with your healthcare provider, or as advised. If imaging tests such as ultrasound were done, they will be reviewed by a doctor. You will be told the results and any new findings that may affect your care.  When to seek medical advice   Call your healthcare provider right away if any of these occur:    Sudden severe pain in the legs or feet    Sudden cold, pale or blue color in the legs or feet    Weakness or numbness in the legs or feet that worsens    Any sore or wound in the legs or feet that wont heal    Weak pulse in your legs or feet  Know the signs of heart attack and stroke  People with PAD are at high risk for heart attack and stroke. Knowing the signs of these problems can help you protect your health and get help when you need it. Call 911 right away if you have any of the following:  Signs  of a heart attack    Chest discomfort, such as pain, aching, tightness, or pressure that lasts more than a few minutes, or that comes and goes    Pain or discomfort in the arms, back, shoulders, neck, or jaw    Shortness of breath    Sweating (often a cold, clammy sweat)    Nausea    Lightheadedness  Signs of a stroke    Sudden numbness or weakness of the face, arms, or legs, especially on one side    Sudden confusion or trouble speaking or understanding    Sudden trouble seeing in one or both eyes    Sudden trouble walking, dizziness, or loss of balance    Sudden, severe headache with no known cause   Date Last Reviewed: 9/21/2015 2000-2017 [employer] [address] [place] [address], [place] [address]. All rights reserved. This information is not intended as a substitute for professional medical care. Always follow your healthcare professional's instructions.

## 2021-06-17 NOTE — PATIENT INSTRUCTIONS - HE
Patient Instructions by Leonora Dolan CNP at 7/11/2019  9:50 AM     Author: Leonora Dolan CNP Service: -- Author Type: Nurse Practitioner    Filed: 7/11/2019 10:19 AM Encounter Date: 7/11/2019 Status: Signed    : Leonora Dolan CNP (Nurse Practitioner)         Patient Education     Your Health Risk Assessment indicates you feel you are not in good physical health.    A healthy lifestyle helps keep the body fit and the mind alert. It helps protect you from disease, helps you fight disease, and helps prevent chronic disease (disease that doesn't go away) from getting worse. This is important as you get older and begin to notice twinges in muscles and joints and a decline in the strength and stamina you once took for granted. A healthy lifestyle includes good healthcare, good nutrition, weight control, recreation, and regular exercise. Avoid harmful substances and do what you can to keep safe. Another part of a healthy lifestyle is stay mentally active and socially involved.    Good healthcare     Have a wellness visit every year.     If you have new symptoms, let us know right away. Don't wait until the next checkup.     Take medicines exactly as prescribed and keep your medicines in a safe place. Tell us if your medicine causes problems.   Healthy diet and weight control     Eat 3 or 4 small, nutritious, low-fat, high-fiber meals a day. Include a variety of fruits, vegetables, and whole-grain foods.     Make sure you get enough calcium in your diet. Calcium, vitamin D, and exercise help prevent osteoporosis (bone thinning).     If you live alone, try eating with others when you can. That way you get a good meal and have company while you eat it.     Try to keep a healthy weight. If you eat more calories than your body uses for energy, it will be stored as fat and you will gain weight.     Recreation   Recreation is not limited to sports and team events. It includes any activity that provides relaxation,  interest, enjoyment, and exercise. Recreation provides an outlet for physical, mental, and social energy. It can give a sense of worth and achievement. It can help you stay healthy.       Patient Education     Exercise for a Healthier Heart  You may wonder how you can improve the health of your heart. If youre thinking about exercise, youre on the right track. You dont need to become an athlete, but you do need a certain amount of brisk exercise to help strengthen your heart. If you have been diagnosed with a heart condition, your doctor may recommend exercise to help stabilize your condition. To help make exercise a habit, choose safe, fun activities.       Be sure to check with your health care provider before starting an exercise program.    Why exercise?  Exercising regularly offers many healthy rewards. It can help you do all of the following:    Improve your blood cholesterol levels to help prevent further heart trouble    Lower your blood pressure to help prevent a stroke or heart attack    Control diabetes, or reduce your risk of getting this disease    Improve your heart and lung function    Reach and maintain a healthy weight    Make your muscles stronger and more limber so you can stay active    Prevent falls and fractures by slowing the loss of bone mass (osteoporosis)    Manage stress better  Exercise tips  Ease into your routine. Set small goals. Then build on them.  Exercise on most days. Aim for a total of 150 or more minutes of moderate to  vigorous intensity activity each week. Consider 40 minutes, 3 to 4 times a week. For best results, activity should last for 40 minutes on average. It is OK to work up to the 40 minute period over time. Examples of moderate-intensity activity is walking one mile in 15 minutes or 30 to 45 minutes of yard work.  Step up your daily activity level. Along with your exercise program, try being more active throughout the day. Walk instead of drive. Do more household  tasks or yard work.  Choose one or more activities you enjoy. Walking is one of the easiest things you can do. You can also try swimming, riding a bike, or taking an exercise class.  Stop exercising and call your doctor if you:    Have chest pain or feel dizzy or lightheaded    Feel burning, tightness, pressure, or heaviness in your chest, neck, shoulders, back, or arms    Have unusual shortness of breath    Have increased joint or muscle pain    Have palpitations or an irregular heartbeat      [id number] [employer]. [address] [place] [address]. All rights reserved. This information is not intended as a substitute for professional medical care. Always follow your healthcare professional's instructions.         Patient Education   Signs of Hearing Loss  Hearing loss is a problem shared by many people. In fact, it is one of the most common health conditions, particularly as people age. Most people over age 65 have some hearing loss, and by age 80, almost everyone does. Because hearing loss usually occurs slowly over the years, you may not realize your hearing ability has gotten worse.       Have your hearing checked  Contact your [facility] care provider if you:    Have to strain to hear normal conversation.    Have to watch other peoples faces very carefully to follow what theyre saying.    Need to ask people to repeat what theyve said.    Often misunderstand what people are saying.    Turn the volume of the television or radio up so high that others complain.    Feel that people are mumbling when theyre talking to you.    Find that the effort to hear leaves you feeling tired and irritated.    Notice, when using the phone, that you hear better with 1 ear than the other.    [id number] [employer]. [address] [place] [address]. All rights reserved. This information is not intended as a substitute for professional medical care. Always follow your healthcare professional's  instructions.         Patient Education   Urinary Incontinence, Female (Adult)  Urinary incontinence means loss of control of the bladder. This problem affects many women, especially as they get older. If you have incontinence, you may be embarrassed to ask for help. But know that this problem can be treated.  Types of Incontinence  There are different types of incontinence. Two of the main types are described here. You can have more than one type.    Stress incontinence. With this type, urine leaks when pressure (stress) is put on the bladder. This may happen when you cough, sneeze, or laugh. Stress incontinence most often occurs because the pelvic floor muscles that support the bladder and urethra are weak. This can happen after pregnancy and vaginal childbirth or a hysterectomy. It can also be due to excess body weight or hormone changes.    Urge incontinence (also called overactive bladder). With this type, a sudden urge to urinate is felt often. This may happen even though there may not be much urine in the bladder. The need to urinate often during the night is common. Urge incontinence most often occurs because of bladder spasms. This may be due to bladder irritation or infection. Damage to bladder nerves or pelvic muscles, constipation, and certain medicines can also lead to urge incontinence.  Treatment of urinary incontinence depends on the cause. Further evaluation is needed to find the type you have. This will likely include an exam and certain tests. Based on the results, you and your healthcare provider can then plan treatment. Until a diagnosis is made, the home care tips below can help relieve symptoms.  Home care    Do pelvic floor muscle exercises, if they are prescribed. The pelvic floor muscles help support the bladder and urethra. Many women find that their symptoms improve when doing special exercises that strengthen these muscles. To do the exercises contract the muscles you would use to stop  your stream of urine, but do this when youre not urinating. Hold for 10 seconds, then relax. Repeat 10 to 20 times in a row, at least 3 times a day. Your provider may give you other instructions for how to do the exercises and how often.    Keep a bladder diary. This helps track how often and how much you urinate over a set period of time. Bring this diary with you to your next visit with the provider. The information can help your provider learn more about your bladder problem.    Lose weight, if advised to by your provider. Excess weight puts pressure on the bladder. Your provider can help you create a weight-loss plan thats right for you. This may include exercising more and making certain diet changes.    Don't consume foods and drinks that may irritate the bladder. These can include alcohol and caffeinated drinks.    Quit smoking. Smoking and other tobacco use can lead to chronic cough that strains the pelvic floor muscles. Smoking may also damage the bladder and urethra. Talk with your provider about treatments or methods you can use to quit smoking.    If drinking large amounts of fluid causes you to have symptoms, you may be advised to limit your fluid intake. You may also be advised to drink most of your fluids during the day and to limit fluids at night.    If youre worried about urine leakage or accidents, you may wear absorbent pads to catch urine. Change the pads often. This helps reduce discomfort. It may also reduce the risk of skin or bladder infections.  Follow-up care  Follow up with your healthcare provider, or as directed. It may take some to find the right treatment for your problem. Your treatment plan may include special therapies or medicines. Certain procedures or surgery may also be options. Be sure to discuss any questions you have with your provider.  When to seek medical advice  Call the healthcare provider right away if any of these occur:    Fever of 100.4 F (38 C) or higher, or as  directed by your provider    Bladder pain or fullness    Abdominal swelling    Nausea or vomiting    Back pain    Weakness, dizziness or fainting  Date Last Reviewed: 10/1/2017    7570-7759 [employer]. [address] [place] [address], [place] [address]. All rights reserved. This information is not intended as a substitute for professional medical care. Always follow your healthcare professional's instructions.     Patient Education   Understanding Advance Care Planning  Advance care planning is the process of deciding ones own future medical care. It helps ensure that if you cant speak for yourself, your wishes can still be carried out. The plan is a series of legal documents that note a persons wishes. The documents vary by state. Advance care planning may be done when a person has a serious illness that is expected to get worse. It may be done before major surgery. And it can help you and your family be prepared in case of a major illness or injury. Advance care planning helps with making decisions at these times.       A health care proxy is a person who acts as the voice of a patient when the patient cant speak for himself or herself. The name of this role varies by state. It may be called a Durable Medical Power of  or Durable Power of  for Healthcare. It may be called an agent, surrogate, or advocate. Or it may be called a representative or decision maker. It is an official duty that is identified by a legal document. The document also varies by state.    Why Is Advance Care Planning Important?  If a person communicates their healthcare wishes:    They will be given medical care that matches their values and goals.    Their family members will not be forced to make decisions in a crisis with no guidance.  Creating a Plan  Making an advance care plan is often done in 3 steps:    Thinking about ones wishes. To create an advance care plan, you should think about what kind of medical  treatment you would want if you lose the ability to communicate. Are there any situations in which you would refuse or stop treatment? Are there therapies you would want or not want? And whom do you want to make decisions for you? There are many places to learn more about how to plan for your care. Ask your doctor or  for resources.    Picking a health care proxy. This means choosing a trusted person to speak for you only when you cant speak for yourself. When you cannot make medical decisions, your proxy makes sure the instructions in your advance care plan are followed. A proxy does not make decisions based on his or her own opinions. They must put aside those opinions and values if needed, and carry out your wishes.    Filling out the legal documents. There are several kinds of legal documents for advance care planning. Each one tells health care providers your wishes. The documents may vary by state. They must be signed and may need to be witnessed or notarized. You can cancel or change them whenever you wish. Depending on your state, the documents may include a Healthcare Proxy form, Living Will, Durable Medical Power of , Advance Directive, or others.  The Familys Role  The best help a family can give is to support their loved ones wishes. Open and honest communication is vital. Family should express any concerns they have about the patients choices while the patient can still make decisions.    [phone] [employer]. [address] [place] [address]. All rights reserved. This information is not intended as a substitute for professional medical care. Always follow your healthcare professional's instructions.         Also, [employer] [place] offers a free, downloadable health care directive that allows you to share your treatment choices and personal preferences if you cannot communicate your wishes. It also allows you to appoint another person (called a  health care agent) to make health care decisions if you are unable to do so. You can download an advance directive by going here: [email or web address]     Patient Education   Personalized Prevention Plan  You are due for the preventive services outlined below.  Your care team is available to assist you in scheduling these services.  If you have already completed any of these items, please share that information with your care team to update in your medical record.  Health Maintenance   Topic Date Due   ? ADVANCE DIRECTIVES DISCUSSED WITH PATIENT  06/05/2011   ? INFLUENZA VACCINE RULE BASED (1) 08/01/2019   ? DEPRESSION FOLLOW UP  01/11/2020   ? PAP SMEAR  06/06/2021   ? MAMMOGRAM  06/21/2021   ? COLONOSCOPY  09/13/2022   ? TD 18+ HE  06/06/2026   ? TDAP ADULT ONE TIME DOSE  Completed

## 2021-06-18 NOTE — PATIENT INSTRUCTIONS - HE
Patient Instructions by Leonora Dolan CNP at 1/8/2021  9:10 AM     Author: Leonora Dolan CNP Service: -- Author Type: Nurse Practitioner    Filed: 1/8/2021  9:52 AM Encounter Date: 1/8/2021 Status: Signed    : Leonora Dolan CNP (Nurse Practitioner)         Patient Education     Exercise for a Healthier Heart  You may wonder how you can improve the health of your heart. If youre thinking about exercise, youre on the right track. You dont need to become an athlete, but you do need a certain amount of brisk exercise to help strengthen your heart. If you have been diagnosed with a heart condition, your doctor may recommend exercise to help stabilize your condition. To help make exercise a habit, choose safe, fun activities.       Be sure to check with your health care provider before starting an exercise program.    Why exercise?  Exercising regularly offers many healthy rewards. It can help you do all of the following:    Improve your blood cholesterol levels to help prevent further heart trouble    Lower your blood pressure to help prevent a stroke or heart attack    Control diabetes, or reduce your risk of getting this disease    Improve your heart and lung function    Reach and maintain a healthy weight    Make your muscles stronger and more limber so you can stay active    Prevent falls and fractures by slowing the loss of bone mass (osteoporosis)    Manage stress better  Exercise tips  Ease into your routine. Set small goals. Then build on them.  Exercise on most days. Aim for a total of 150 or more minutes of moderate to  vigorous intensity activity each week. Consider 40 minutes, 3 to 4 times a week. For best results, activity should last for 40 minutes on average. It is OK to work up to the 40 minute period over time. Examples of moderate-intensity activity is walking one mile in 15 minutes or 30 to 45 minutes of yard work.  Step up your daily activity level. Along with your exercise program, try  being more active throughout the day. Walk instead of drive. Do more household tasks or yard work.  Choose one or more activities you enjoy. Walking is one of the easiest things you can do. You can also try swimming, riding a bike, or taking an exercise class.  Stop exercising and call your doctor if you:    Have chest pain or feel dizzy or lightheaded    Feel burning, tightness, pressure, or heaviness in your chest, neck, shoulders, back, or arms    Have unusual shortness of breath    Have increased joint or muscle pain    Have palpitations or an irregular heartbeat      [id number] [employer]. [address] [place] [address]. All rights reserved. This information is not intended as a substitute for professional medical care. Always follow your healthcare professional's instructions.         Patient Education   Signs of Hearing Loss  Hearing loss is a problem shared by many people. In fact, it is one of the most common health conditions, particularly as people age. Most people over age 65 have some hearing loss, and by age 80, almost everyone does. Because hearing loss usually occurs slowly over the years, you may not realize your hearing ability has gotten worse.       Have your hearing checked  Contact your [facility] care provider if you:    Have to strain to hear normal conversation.    Have to watch other peoples faces very carefully to follow what theyre saying.    Need to ask people to repeat what theyve said.    Often misunderstand what people are saying.    Turn the volume of the television or radio up so high that others complain.    Feel that people are mumbling when theyre talking to you.    Find that the effort to hear leaves you feeling tired and irritated.    Notice, when using the phone, that you hear better with 1 ear than the other.    [id number] [employer]. [address] [place] [address]. All rights reserved. This information is not intended as a  substitute for professional medical care. Always follow your healthcare professional's instructions.         Patient Education   Understanding Advance Care Planning  Advance care planning is the process of deciding ones own future medical care. It helps ensure that if you cant speak for yourself, your wishes can still be carried out. The plan is a series of legal documents that note a persons wishes. The documents vary by state. Advance care planning may be done when a person has a serious illness that is expected to get worse. It may be done before major surgery. And it can help you and your family be prepared in case of a major illness or injury. Advance care planning helps with making decisions at these times.       A health care proxy is a person who acts as the voice of a patient when the patient cant speak for himself or herself. The name of this role varies by state. It may be called a Durable Medical Power of  or Durable Power of  for Healthcare. It may be called an agent, surrogate, or advocate. Or it may be called a representative or decision maker. It is an official duty that is identified by a legal document. The document also varies by state.    Why Is Advance Care Planning Important?  If a person communicates their healthcare wishes:    They will be given medical care that matches their values and goals.    Their family members will not be forced to make decisions in a crisis with no guidance.  Creating a Plan  Making an advance care plan is often done in 3 steps:    Thinking about ones wishes. To create an advance care plan, you should think about what kind of medical treatment you would want if you lose the ability to communicate. Are there any situations in which you would refuse or stop treatment? Are there therapies you would want or not want? And whom do you want to make decisions for you? There are many places to learn more about how to plan for your care. Ask your doctor or   for resources.    Picking a health care proxy. This means choosing a trusted person to speak for you only when you cant speak for yourself. When you cannot make medical decisions, your proxy makes sure the instructions in your advance care plan are followed. A proxy does not make decisions based on his or her own opinions. They must put aside those opinions and values if needed, and carry out your wishes.    Filling out the legal documents. There are several kinds of legal documents for advance care planning. Each one tells health care providers your wishes. The documents may vary by state. They must be signed and may need to be witnessed or notarized. You can cancel or change them whenever you wish. Depending on your state, the documents may include a Healthcare Proxy form, Living Will, Durable Medical Power of , Advance Directive, or others.  The Familys Role  The best help a family can give is to support their loved ones wishes. Open and honest communication is vital. Family should express any concerns they have about the patients choices while the patient can still make decisions.    2138-9118 [employer]. [address] [place] [address]. All rights reserved. This information is not intended as a substitute for professional medical care. Always follow your healthcare professional's instructions.         Also, [employer] [facility] offers a free, downloadable health care directive that allows you to share your treatment choices and personal preferences if you cannot communicate your wishes. It also allows you to appoint another person (called a health care agent) to make health care decisions if you are unable to do so. You can download an advance directive by going here: [email or web address]     Patient Education   Personalized Prevention Plan  You are due for the preventive services outlined below.  Your care team is available to  assist you in scheduling these services.  If you have already completed any of these items, please share that information with your care team to update in your medical record.  Health Maintenance   Topic Date Due   ? DEPRESSION ACTION PLAN  1960   ? HEPATITIS C SCREENING  1960   ? HIV SCREENING  07/20/1974   ? ADVANCE CARE PLANNING  06/05/2011   ? PAP SMEAR  06/06/2021   ? HPV TEST  06/06/2021   ? MAMMOGRAM  06/21/2021   ? MEDICARE ANNUAL WELLNESS VISIT  01/08/2022   ? COLORECTAL CANCER SCREENING  09/13/2022   ? LIPID  01/14/2025   ? TD 18+ HE  06/06/2026   ? Pneumococcal Vaccine: Pediatrics (0 to 5 Years) and At-Risk Patients (6 to 64 Years)  Completed   ? INFLUENZA VACCINE RULE BASED  Completed   ? TDAP ADULT ONE TIME DOSE  Completed   ? ZOSTER VACCINES  Completed   ? HEPATITIS B VACCINES  Aged Out

## 2021-06-18 NOTE — PROGRESS NOTES
Assessment and Plan:       1. Routine general medical examination at a health care facility  Discussed consuming a healthy diet and exercising.  Discussed importance routine sunscreen use.  Discussed adequate calcium and vitamin D intake.  She is due for mammogram.  Updated shingles vaccine.  - Varicella Zoster, Recombinant Vaccine IM  - Mammo Screening Bilateral; Future  - Hemoglobin    2. Hypertension  This is controlled without medication.  Will continue to monitor.    3. Mixed hyperlipidemia  She stopped taking her atorvastatin.  Will notify patient of results.  - Lipid Parmelee, FASTING; Future  - Lipid Cascade, FASTING    4. Obstructive sleep apnea  She uses oxygen for CPAP only at bedtime and uses 2 L with nasal canula.     5. Morbid Obesity  The following high BMI interventions were performed this visit: dietary needs education, exercise promotion: strength training and exercise promotion: stretching    6. Medication management  - Basic Metabolic Panel    7. Smoking  I have counseled the patient for tobacco cessation and the follow up will occur  at the next visit.    8. Mild episode of recurrent major depressive disorder (H)  Discussed treatment options.  Will start fluoxetine 10 mg daily.  Educated on its indications and side effects including increased risk of suicide.  She declines counseling. Will patient follow-up in 1 month for medication management or sooner with any further concerns.  She is content with the plan.  The following are part of a depression follow up plan for the patient:  emotional support education    - FLUoxetine (PROZAC) 10 MG tablet; Take 1 tablet (10 mg total) by mouth daily.  Dispense: 30 tablet; Refill: 0        The patient's current medical problems were reviewed.    The following health maintenance schedule was reviewed with the patient and provided in printed form in the after visit summary:   Health Maintenance   Topic Date Due     DEPRESSION FOLLOW UP  1960      ADVANCE DIRECTIVES DISCUSSED WITH PATIENT  06/05/2011     MAMMOGRAM  12/20/2018     PAP SMEAR  06/06/2021     COLONOSCOPY  09/13/2022     TD 18+ HE  06/06/2026     INFLUENZA VACCINE RULE BASED  Completed     TDAP ADULT ONE TIME DOSE  Completed        Subjective:   Chief Complaint: Rochelle Garza is an 57 y.o. female here for an Annual Wellness visit.   HPI: Patient is single and has no children.  Her last menstrual period was in 2007.  She has not had any bleeding since.  She does perform self breast exams.  Patient has a history of hypertension.  She was taking hydrochlorothiazide but stopped the medication and her blood pressure has been normal since.  She was taking atorvastatin for hyperlipidemia but has not taken it for over the past year..  Last cholesterol check was on 6/12/17 with a total cholesterol 217, triglycerides 193, HDL 34, .  She is trying to consume a healthy diet.  She denies any exercise due to osteoarthritis and chronic lumbar pain.  Patient states she cannot stand for prolonged periods. She receives disability due to her cognitive disability.  She is concerned for depression would like to start medication.  She has a history of depression in the past.  She denies thoughts of suicide.  Patient states she has moments where she experiences lack of motivation and sadness.  She is concerned of her financial situation.  Patient has NADIA and uses a CPAP machine.    Review of Systems:  Please see above.  The rest of the review of systems are negative for all systems.    Patient Care Team:  Leonora Dolan CNP as PCP - General     Patient Active Problem List   Diagnosis     Osteoarthrosis     Rosacea     Insomnia     Edema     Allergies     Morbid Obesity     Major Depression, Recurrent     Obstructive sleep apnea     Hypertension     Diverticulitis Of Colon     Tremor     Mixed hyperlipidemia     Polyarthralgia     Primary osteoarthritis involving multiple joints     CMC DJD(carpometacarpal  degenerative joint disease), localized primary     Lumbar spondylosis     Past Medical History:   Diagnosis Date     Anxiety      Depression      Hyperlipidemia      Hypertension      Ingrown toenail      Onychomycosis      Painful swelling of joint      Rheumatoid arthritis (H)      Sleep apnea     Using CPAP machine      Past Surgical History:   Procedure Laterality Date     ANKLE SURGERY Right      HERNIA REPAIR       LUMBAR FUSION  2006    Done at [facility] APPENDECTOMY      Description: Appendectomy;  Recorded: 09/23/2008;     SPINAL FUSION        Family History   Problem Relation Age of Onset     Breast cancer Mother 49     Hypertension Father      Dementia Father      No Medical Problems Sister      No Medical Problems Daughter      No Medical Problems Maternal Grandmother      No Medical Problems Maternal Grandfather      Diabetes Paternal Grandmother      No Medical Problems Paternal Grandfather      No Medical Problems Maternal Aunt      No Medical Problems Paternal Aunt      BRCA 1/2 Neg Hx      Colon cancer Neg Hx      Endometrial cancer Neg Hx      Ovarian cancer Neg Hx       Social History     Social History     Marital status: Single     Spouse name: N/A     Number of children: N/A     Years of education: N/A     Occupational History     Not on file.     Social History Main Topics     Smoking status: Current Every Day Smoker     Packs/day: 0.50     Smokeless tobacco: Never Used     Alcohol use No     Drug use: No     Sexual activity: No     Other Topics Concern     Not on file     Social History Narrative      Current Outpatient Prescriptions   Medication Sig Dispense Refill     fluticasone (FLONASE) 50 mcg/actuation nasal spray SHAKE WELL AND USE 2 SPRAYS IN EACH NOSTRIL DAILY FOR 10 DAYS 48 g 3     FLUoxetine (PROZAC) 10 MG tablet Take 1 tablet (10 mg total) by mouth daily. 30 tablet 0     No current facility-administered medications for this visit.       Objective:   Vital Signs:   Visit  "Vitals     /84     Pulse 78     Ht 5' 2.5\" (1.588 m)     Wt (!) 251 lb 6.4 oz (114 kg)     BMI 45.25 kg/m2        VisionScreening:  No exam data present     PHYSICAL EXAM  /84  Pulse 78  Ht 5' 2.5\" (1.588 m)  Wt (!) 251 lb 6.4 oz (114 kg)  BMI 45.25 kg/m2    General Appearance:    Alert, cooperative, no distress, appears stated age   Head:    Normocephalic, without obvious abnormality, atraumatic   Eyes:    PERRL, conjunctiva/corneas clear, EOM's intact, fundi     benign, both eyes   Ears:    Normal TM's and external ear canals, both ears   Nose:   Nares normal, septum midline, mucosa normal, no drainage     or sinus tenderness   Throat:   Lips, mucosa, and tongue normal; teeth and gums normal   Neck:   Supple, symmetrical, trachea midline, no adenopathy;     thyroid:  no enlargement/tenderness/nodules; no carotid    bruit or JVD   Back:     Symmetric, no curvature, ROM normal, no CVA tenderness   Lungs:     Clear to auscultation bilaterally, respirations unlabored   Chest Wall:    No tenderness or deformity    Heart:    Regular rate and rhythm, S1 and S2 normal, no murmur, rub    or gallop   Breast Exam:    No tenderness, masses, or nipple abnormality   Abdomen:     Soft, non-tender, bowel sounds active all four quadrants,     no masses, no organomegaly   Genitalia:    deferred   Rectal:    deferred   Extremities:   Extremities normal, atraumatic, no cyanosis or edema   Pulses:   2+ and symmetric all extremities   Skin:   Skin color, texture, turgor normal, no rashes or lesions   Lymph nodes:   Cervical, supraclavicular, and axillary nodes normal   Neurologic:   CNII-XII intact, normal strength, sensation and reflexes     throughout       Assessment Results 6/14/2018   Activities of Daily Living No help needed   Instrumental Activities of Daily Living No help needed   Get Up and Go Score Less than 12 seconds   Mini Cog Total Score 3   Some recent data might be hidden     A Mini-Cog score of 0-2 " suggests the possibility of dementia, score of 3-5 suggests no dementia    Identified Health Risks:     The patient was provided with suggestions to help her develop a healthy lifestyle.   She is at risk for lack of exercise and has been provided with information to increase physical activity for the benefit of her well-being.  The patient was counseled and encouraged to consider modifying their diet and eating habits. She was provided with information on recommended healthy diet options.  The patient was provided with written information regarding signs of hearing loss.  The patient's PHQ-9 score is consistent with mild depression. She was provided with information regarding depression and was advised to schedule a follow up appointment in 4 weeks to further address this issue.  Information regarding advance directives (living hui), including where she can download the appropriate form, was provided to the patient via the AVS.

## 2021-06-18 NOTE — LETTER
Letter by Sammi Mahoney MD at      Author: Sammi Mahoney MD Service: -- Author Type: --    Filed:  Encounter Date: 2/22/2019 Status: (Other)        [facility] MEDICINE/OB  [address]  [place] [address]  [phone]         Rochelle Garza  [address]  [place] [address]        02/22/19    Dear Rochelle Garza,     At [facility] we care about your health and well-being. Your primary care provider is committed to ensuring you receive high quality care and has chosen a network of specialists to assist in providing that care. Recently Dr. Sammi Mahoney referred you to [facility] for specialty care. They have made an attempt to connect with you to assist with scheduling, however they have been unable to reach you by phone.       It is important to your overall health to follow through with the recommendation from your provider. Please call [facility] ([phone]) at your earliest convenience for assistance in scheduling an appointment.  If you have already scheduled this appointment, please disregard this notice.  Thank you for choosing [facility] for your healthcare needs.       Sincerely,     Dr. Sammi Mahoney /   [facility] Specialty Scheduling

## 2021-06-19 NOTE — PROGRESS NOTES
Assessment and Plan:     1. Mild episode of recurrent major depressive disorder (H)  Obtained PHQ 9 score 5 today.  She would like to continue with the current dose of fluoxetine.  I encouraged follow-up in 6 months for medication management or sooner with any further concerns.  She is content with the plan.  - FLUoxetine (PROZAC) 10 MG tablet; Take 1 tablet (10 mg total) by mouth daily.  Dispense: 90 tablet; Refill: 2    Subjective:     Rochelle is a 57 y.o. female presenting to the clinic for follow-up on depression.  Patient was seen in clinic on 6/14/18 where she expressed concerns for worsening depression.  She has a history of depression in the past.  Patient is currently taking fluoxetine 10 mg daily.  She is tolerating the medication well and denies any side effects.  She has found that the medication assists with her symptoms.  She has felt less sad and feels as though her mood is lifted.  Previously, she had thoughts that no one liked her and she wished she was dead.  Patient states she has not had thoughts of this since she started the medication.  She has been seeing friends more often and she feels supported.  Her sister and brother near live nearby so she has been seeing them as well.  She denies thoughts of suicide today.    Review of Systems: A complete 14 point review of systems was obtained and is negative or as stated in the history of present illness.    Social History     Social History     Marital status: Single     Spouse name: N/A     Number of children: N/A     Years of education: N/A     Occupational History     Not on file.     Social History Main Topics     Smoking status: Current Every Day Smoker     Packs/day: 0.50     Smokeless tobacco: Never Used     Alcohol use No     Drug use: No     Sexual activity: No     Other Topics Concern     Not on file     Social History Narrative       Active Ambulatory Problems     Diagnosis Date Noted     Osteoarthrosis      Rosacea      Insomnia      Edema   "    Allergies      Morbid Obesity      Major Depression, Recurrent      Obstructive sleep apnea      Hypertension      Diverticulitis Of Colon      Tremor      Mixed hyperlipidemia 06/12/2017     Polyarthralgia 08/02/2017     Primary osteoarthritis involving multiple joints 08/02/2017     CMC DJD(carpometacarpal degenerative joint disease), localized primary 08/02/2017     Lumbar spondylosis 11/06/2017     Resolved Ambulatory Problems     Diagnosis Date Noted     Dyspepsia      Hyperlipidemia      Rheumatoid arthritis (H) 06/06/2016     Past Medical History:   Diagnosis Date     Anxiety      Depression      Hyperlipidemia      Hypertension      Ingrown toenail      Onychomycosis      Painful swelling of joint      Rheumatoid arthritis (H)      Sleep apnea        Family History   Problem Relation Age of Onset     Breast cancer Mother 49     Hypertension Father      Dementia Father      No Medical Problems Sister      No Medical Problems Daughter      No Medical Problems Maternal Grandmother      No Medical Problems Maternal Grandfather      Diabetes Paternal Grandmother      No Medical Problems Paternal Grandfather      No Medical Problems Maternal Aunt      No Medical Problems Paternal Aunt      BRCA 1/2 Neg Hx      Colon cancer Neg Hx      Endometrial cancer Neg Hx      Ovarian cancer Neg Hx        Objective:     /76  Pulse 78  Ht 5' 2.5\" (1.588 m)  Wt (!) 245 lb 9.6 oz (111.4 kg)  BMI 44.2 kg/m2    Patient is alert, in no obvious distress. Dress is appropriate for the situation.  She makes good eye contact.   Skin: Warm, dry.    Lungs:  Clear to auscultation. Respirations even and unlabored.  No wheezing or rales noted.   Heart:  Regular rate and rhythm.  No murmurs               "

## 2021-06-19 NOTE — LETTER
Letter by Leonora Dolan CNP at      Author: Leonora Dolan CNP Service: -- Author Type: --    Filed:  Encounter Date: 7/25/2019 Status: (Other)         07/25/19      Rochelle Garza  [address]  [place] [address]    Dear Rochelle,    As a valued [facility] patient, your healthcare needs are our priority. Our clinic records indicate we have attempted to contact you to schedule a consultation with Dr. Scooter Brunson, but have not heard back from you. To prevent further delays in your care please contact our office to schedule your appointment as soon as possible.  We can be reached at: [phone]    Sincerely,    [facility]

## 2021-06-21 NOTE — LETTER
Letter by Leonora Dolan CNP at      Author: Leonora Dolan CNP Service: -- Author Type: --    Filed:  Encounter Date: 12/14/2020 Status: (Other)         Rochelle Garza  [address]  [place] [address]      December 14, 2020      Dear Rochelle Garza,    Per our refill protocol, you are due for a medication check office visit. Your prescription for HYDRODIURIL was sent to your pharmacy (12.9.2020). Please call [phone] to schedule an office visit with LEONORA DOLAN before your next refill is needed to avoid delays.    Thank you,  [facility]

## 2021-06-24 NOTE — PATIENT INSTRUCTIONS - HE
Treat for possible scabies with permethrin cream.  Apply at night and wash off in morning.    Use triamcinolone lotion during day as needed    Sarna anti-itch lotion is OTC and can also provide some relief    See dermatologist if rash doesn't go away with above treatment.

## 2021-06-24 NOTE — PROGRESS NOTES
Assessment/Plan:     1. Scabies     2. Rash and nonspecific skin eruption  Ambulatory referral to Dermatology       Diagnoses and all orders for this visit:    Scabies  Discussed with patient that rash is concerning for possible scabies.  We will treat for this with topical permethrin cream.  Prescription sent to pharmacy.  Counseled on use of medication and side effects.  Advised washing all linens, clothing and furniture covers after treatment to prevent reinfection.  If rash not improved with this treatment, will have her follow-up with dermatology for further evaluation.  Discussed other symptomatic cares.  Will try some topical triamcinolone lotion to help with itching and inflammation.  Also discussed over-the-counter Sarna anti-itch lotion can be helpful for relief of pruritus.     Rash and nonspecific skin eruption  -     Ambulatory referral to Dermatology    Other orders  -     permethrin (ELIMITE) 5 % cream  Dispense: 60 g; Refill: 1  -     triamcinolone (KENALOG) 0.1 % lotion  Dispense: 60 mL; Refill: 0           Subjective:      Rochelle Garza is a 58 y.o. female comes in today with concern about a rash.  States that this started about a month ago.  Initially developed on her forearms.  It is now spread to multiple areas of her body.  Has a rash on her chest, abdomen, hands, feet, legs and back.  Over the past week it is become intensely pruritic.  She thought it could be her laundry detergent so she made a change in her laundry soap.  That has not been helpful.  She also tried a new soap for bathing which has not given relief.  A friend who has eczema gave her some ointment to use.  That helped relieve the itch for a period of time but then the itch returned.  The rash did not go away.  Patient does not know the name of the cream.  Patient did have some old hydrocortisone cream at home that she tried which did not give much relief.  Patient has not had any recent medication changes.  She has no history  of allergies other than penicillins.  She has no history of other skin conditions.  She has not had recent travel.  Has no close contacts with similar rash.  She is otherwise feeling well.  Has not had fevers or chills or other concerning symptoms.  Reviewed medications and allergies.  Review of systems assessed and otherwise negative.  No other concerns or questions today.    Current Outpatient Medications   Medication Sig Dispense Refill     FLUoxetine (PROZAC) 10 MG tablet Take 1 tablet (10 mg total) by mouth daily. 90 tablet 2     fluticasone (FLONASE) 50 mcg/actuation nasal spray SHAKE WELL AND USE 2 SPRAYS IN EACH NOSTRIL DAILY FOR 10 DAYS 48 g 3     permethrin (ELIMITE) 5 % cream Apply from head to soles of feet; leave on for 8 to 14 hours and Wash off in AM. Repeat in 7-14 days 60 g 1     triamcinolone (KENALOG) 0.1 % lotion Apply to affected areas 1-2 times daily as needed 60 mL 0     No current facility-administered medications for this visit.        Past Medical History, Family History, and Social History reviewed.  Past Medical History:   Diagnosis Date     Anxiety      Depression      Hyperlipidemia      Hypertension      Ingrown toenail      Onychomycosis      Painful swelling of joint      Rheumatoid arthritis (H)      Sleep apnea     Using CPAP machine     Past Surgical History:   Procedure Laterality Date     ANKLE SURGERY Right      HERNIA REPAIR       LUMBAR FUSION  2006    Done at [facility] APPENDECTOMY      Description: Appendectomy;  Recorded: 09/23/2008;     SPINAL FUSION       Penicillins  Family History   Problem Relation Age of Onset     Breast cancer Mother 49     Hypertension Father      Dementia Father      No Medical Problems Sister      No Medical Problems Daughter      No Medical Problems Maternal Grandmother      No Medical Problems Maternal Grandfather      Diabetes Paternal Grandmother      No Medical Problems Paternal Grandfather      No Medical Problems Maternal Aunt       No Medical Problems Paternal Aunt      BRCA 1/2 Neg Hx      Colon cancer Neg Hx      Endometrial cancer Neg Hx      Ovarian cancer Neg Hx      Social History     Socioeconomic History     Marital status: Single     Spouse name: Not on file     Number of children: Not on file     Years of education: Not on file     Highest education level: Not on file   Social Needs     Financial resource strain: Not on file     Food insecurity - worry: Not on file     Food insecurity - inability: Not on file     Transportation needs - medical: Not on file     Transportation needs - non-medical: Not on file   Occupational History     Not on file   Tobacco Use     Smoking status: Current Every Day Smoker     Packs/day: 0.50     Smokeless tobacco: Never Used   Substance and Sexual Activity     Alcohol use: No     Drug use: No     Sexual activity: No   Other Topics Concern     Not on file   Social History Narrative     Not on file         Review of systems is as stated in HPI, and the remainder of the 10 system review is otherwise negative.    Objective:     Vitals:    02/13/19 0926   BP: 120/80   Patient Site: Right Arm   Patient Position: Sitting   Cuff Size: Adult Large   Pulse: 75   Temp: 98.3  F (36.8  C)   SpO2: 97%   Weight: (!) 244 lb (110.7 kg)    Body mass index is 43.92 kg/m .    General appearance: alert, appears stated age and cooperative  Skin: Multiple small, erythematous papules present with multiple excoriations on upper chest, abdomen, forearms, shins, hands, feet and upper back.  Miniature wheels also noted in several areas.  Neurologic: Grossly normal        This note has been dictated using voice recognition software. Any grammatical or context distortions are unintentional and inherent to the the software.

## 2021-06-26 ENCOUNTER — HEALTH MAINTENANCE LETTER (OUTPATIENT)
Age: 61
End: 2021-06-26

## 2021-06-30 ENCOUNTER — RECORDS - HEALTHEAST (OUTPATIENT)
Dept: FAMILY MEDICINE | Facility: CLINIC | Age: 61
End: 2021-06-30

## 2021-06-30 DIAGNOSIS — M47.816 LUMBAR SPONDYLOSIS: ICD-10-CM

## 2021-07-04 NOTE — TELEPHONE ENCOUNTER
Telephone Encounter by Marcela Maritnez RN at 6/30/2021 10:19 AM     Author: Marcela Martinez RN Service: -- Author Type: Registered Nurse    Filed: 6/30/2021 10:19 AM Encounter Date: 6/30/2021 Status: Signed    : Marcela Martinez RN (Registered Nurse)       RN cannot approve Refill Request    RN can NOT refill this medication med is not covered by policy/route to provider. Last office visit: 1/14/2020 Leonora Dolan CNP Last Physical: 1/8/2021 Last MTM visit: Visit date not found Last visit same specialty: 1/14/2020 Leonora Dolan CNP.  Next visit within 3 mo: Visit date not found  Next physical within 3 mo: Visit date not found      [facility] Triage/Med Refill 6/30/2021    Requested Prescriptions   Pending Prescriptions Disp Refills   ? ibuprofen (ADVIL,MOTRIN) 600 MG tablet [Pharmacy Med Name: IBUPROFEN 600MG TABLETS] 30 tablet 0     Sig: TAKE 1 TABLET(600 MG) BY MOUTH EVERY 6 HOURS AS NEEDED FOR PAIN       There is no refill protocol information for this order

## 2021-07-05 RX ORDER — IBUPROFEN 600 MG/1
TABLET, FILM COATED ORAL
Qty: 30 TABLET | Refills: 0 | Status: SHIPPED | OUTPATIENT
Start: 2021-07-05 | End: 2021-11-22

## 2021-07-13 ENCOUNTER — RECORDS - HEALTHEAST (OUTPATIENT)
Dept: ADMINISTRATIVE | Facility: CLINIC | Age: 61
End: 2021-07-13

## 2021-07-21 ENCOUNTER — RECORDS - HEALTHEAST (OUTPATIENT)
Dept: ADMINISTRATIVE | Facility: CLINIC | Age: 61
End: 2021-07-21

## 2021-07-22 ENCOUNTER — RECORDS - HEALTHEAST (OUTPATIENT)
Dept: FAMILY MEDICINE | Facility: CLINIC | Age: 61
End: 2021-07-22

## 2021-07-22 DIAGNOSIS — Z12.31 OTHER SCREENING MAMMOGRAM: ICD-10-CM

## 2021-09-23 NOTE — TELEPHONE ENCOUNTER
HOSPITALIST  PROGRESS NOTE:    Patient:  Brittany Flowers Attending:  Patrick Zhang MD   :  1981 Date:  2021 11:24 AM   AGE:  40 year old Current Hospital Day: Hospital Day: 7   SEX:  female        Chief Complaint:    There are no active hospital problems to display for this patient.       Subjective:  Brittany Flowers is a 40 year old female who was admitted on 2021 for right diabetic foot wound/ulcer.    Patient was seen and examined today.  Patient has no acute overnight event.  She stated that she is feeling okay.  No chest pain or shortness of breath.  Denied nausea or vomiting..     Objective:       Intake/Output:      I&O Last shift:  No intake/output data recorded.    I&O Last 24 hours:      Intake/Output Summary (Last 24 hours) at 2021 1124  Last data filed at 2021 1321  Gross per 24 hour   Intake 240 ml   Output 600 ml   Net -360 ml       Last Stool Occurrence:       Vitals 24 Hour Range Last Value   Temperature Temp  Min: 97.1 °F (36.2 °C)  Max: 98.7 °F (37.1 °C) 98.1 °F (36.7 °C) (21)   Pulse Pulse  Min: 71  Max: 83 71 (21)   Respiratory Resp  Min: 16  Max: 16 16 (21)   Non-Invasive Blood Pressure BP  Min: 128/70  Max: 152/75 (!) 152/75 (informed nurse) (21)   Pulse Oximetry SpO2  Min: 95 %  Max: 98 % 95 % (21)       Vital Most Recent Value First Value   Weight 83.8 kg (184 lb 12.8 oz) Weight: 85 kg (187 lb 4.8 oz)   Height 5' 5\" (165.1 cm) Height: 5' 5\" (165.1 cm)     Physical Exam:  General - Alert and Oriented to person, place and time.  Patient is in no acute distress.   Patient is conversing freely in full sentences.    HEENT - Pupils equally round.  mucous membranes are moist.  Neck is soft and supple.   CV - Regular rate and rhythm, normal S1 and S2, no S3 or S4, no murmurs or rubs.   Pulm -  Lungs are clear to auscultation bilaterally.  No wheezes, rales or rhonchi..  Abd - Soft, non-tender and non-distended.   RN cannot approve Refill Request    RN can NOT refill this medication med is not covered by policy/route to provider     . Last office visit: 1/14/2020 Leonora Dolan CNP Last Physical: 7/11/2019 Last MTM visit: Visit date not found Last visit same specialty: 1/14/2020 Leonora Dolan CNP.  Next visit within 3 mo: Visit date not found  Next physical within 3 mo: Visit date not found      Marjorie Wise, [facility] Triage/Med Refill 6/25/2020    Requested Prescriptions   Pending Prescriptions Disp Refills     ibuprofen (ADVIL,MOTRIN) 600 MG tablet [Pharmacy Med Name: IBUPROFEN 600MG TABLETS] 30 tablet 0     Sig: TAKE 1 TABLET BY MOUTH EVERY 6 HOURS AS NEEDED FOR PAIN       There is no refill protocol information for this order            Bowel sounds are normoactive.  No guarding or rebound tenderness.     Ext - right foot with dressing no leg edema on bilateral lower extremity       Skin - No rashes or erythema.  Neuro - no facial droop  No focal deficits.     Laboratory Results:  Recent Labs   Lab 09/23/21  0647 09/18/21  0729 09/17/21  1912 09/16/21  1552   SODIUM 138 136   < >  --    POTASSIUM 4.0 4.0   < >  --    CHLORIDE 106 103   < >  --    CO2 26 24   < >  --    BUN 15 14   < >  --    CREATININE 1.63* 0.80  --   --    GLUCOSE 83 179*   < >  --    ALBUMIN  --  2.4*  --   --    AST  --  6  --   --    BILIRUBIN  --  0.9  --   --    TSH  --   --   --  <0.008*    < > = values in this interval not displayed.     Recent Labs   Lab 09/23/21  0647 09/21/21  0556   WBC 7.8 6.6   HGB 9.1* 8.7*   HCT 30.7* 28.5*    289   PST  --  13*       URINE  Recent Labs   Lab 09/22/21  1203   USPG 1.010   UPH 5.0   UKET Negative   UPROT Negative   UGLU Trace*   UBILI Negative   UROB 0.2   UWBC Trace*   UNITR Negative   URBC Negative   KAT 75       Cultures:  Pending    Radiology Results:  XR Toe 2+ View Right   Final Result   IMPRESSION:     1. Increased diffuse soft tissue swelling of the right foot distally.   Suspected multiple very small foci of soft tissue gas which are new versus   prior and concerning for soft tissue infection.   2. Progressive impaction and mild ostial lysis of the first and second   metatarsal fracture. No new fracture.      US Renal Complete (Comp Urinary System)    (Results Pending)       Assessment and plan:   Right diabetic foot infection with suspected underlying abscess and osteomyelitis  Sepsis present on admission with leukocytosis and tachycardia and temperature of 100.7° but no evidence of severe sepsis  Status INCISION AND DRAINAGE OF RIGHT FOOT WITH RIGHT FOOT BONE BIOPSY METATARSALS 1, 2,  Patient has been on IV antibiotic.  Infectious disease following the patient.  Wound culture has been growing MSSA  Patient have  surgery today    Acute kidney injury.  Renal function continued to get worse despite the patient does deny fluid.  Renal function is not improving.  Nephrology on board.  Further recommendations per Pulmonary.  Avoid hepatotoxic agent.    Type 1 diabetes mellitus with chronic complication including severe diabetic neuropathy and gastroparesis  A1 c level is 11.7 indicating that she has poorly controlled diabetes mellitus  Blood glucose has been stable.  Patient has been followed by Endocrinology.    Gastroparesis.  Patient continued to have nausea and vomiting from her gastroparesis.  Patient stated that she is feeling better now.  Advised to eat small and frequent meals.    Primary hypertension.  Blood pressure is slightly on the upper side.  Will hold all blood pressure medication appropriately.    Microcytic Iron deficiency anemia hemoglobin is stable.  Continue on iron supplementation.  If hemoglobin continues to drop, will give IV iron    Status post laparoscopic removal of the gastric electric stimulator.  Patient was having gastric stimulator for her gastroparesis.  Wound VAC applied by the wound care     Hyponatremia.  Improved.  Likely from hyperglycemia.    Diet:  diabetic diet    Best practice:  DVT prophylaxis:SCDs, TEDs  [place]omo in Place: No    Goals of Care:    Patient is decisional: Yes  Patient has POA documents in the chart: No  Code Status: Full Code  Rationale behind code status: self    DISCHARGE PLANNING:  EXPECTED DISCHARGE DATE: TBD  POSSIBLE BARRIERS TO DISCHARGE: N/A  TENTATIVE DISCHARGE DISPOSITION: tbd          No orders of the defined types were placed in this encounter.        Care plan  discussed in detail with:  Patient, RN and MD      9/23/2021  11:24 AM    Patrick Brown  [phone]  Hospitalist,[place] [address].   TEL. [phone]   FAX. [phone]

## 2021-10-16 ENCOUNTER — HEALTH MAINTENANCE LETTER (OUTPATIENT)
Age: 61
End: 2021-10-16

## 2021-11-22 ENCOUNTER — OFFICE VISIT (OUTPATIENT)
Dept: FAMILY MEDICINE | Facility: CLINIC | Age: 61
End: 2021-11-22
Payer: COMMERCIAL

## 2021-11-22 VITALS
HEIGHT: 64 IN | OXYGEN SATURATION: 95 % | HEART RATE: 78 BPM | WEIGHT: 253.2 LBS | BODY MASS INDEX: 43.23 KG/M2 | DIASTOLIC BLOOD PRESSURE: 74 MMHG | SYSTOLIC BLOOD PRESSURE: 128 MMHG

## 2021-11-22 DIAGNOSIS — E78.2 MIXED HYPERLIPIDEMIA: ICD-10-CM

## 2021-11-22 DIAGNOSIS — I10 BENIGN ESSENTIAL HYPERTENSION: Primary | ICD-10-CM

## 2021-11-22 DIAGNOSIS — E66.01 MORBID OBESITY (H): ICD-10-CM

## 2021-11-22 DIAGNOSIS — F17.200 SMOKING: ICD-10-CM

## 2021-11-22 DIAGNOSIS — F33.0 MILD EPISODE OF RECURRENT MAJOR DEPRESSIVE DISORDER (H): ICD-10-CM

## 2021-11-22 DIAGNOSIS — R73.01 IMPAIRED FASTING GLUCOSE: ICD-10-CM

## 2021-11-22 DIAGNOSIS — Z23 HIGH PRIORITY FOR 2019-NCOV VACCINE: ICD-10-CM

## 2021-11-22 DIAGNOSIS — R91.8 PULMONARY NODULES: ICD-10-CM

## 2021-11-22 DIAGNOSIS — D58.2 ELEVATED HEMOGLOBIN (H): ICD-10-CM

## 2021-11-22 DIAGNOSIS — Z79.899 MEDICATION MANAGEMENT: ICD-10-CM

## 2021-11-22 DIAGNOSIS — G47.33 OBSTRUCTIVE SLEEP APNEA: ICD-10-CM

## 2021-11-22 PROBLEM — E11.9 DIABETES MELLITUS, TYPE 2 (H): Status: ACTIVE | Noted: 2021-11-22

## 2021-11-22 LAB
ALBUMIN SERPL-MCNC: 4.1 G/DL (ref 3.5–5)
ALP SERPL-CCNC: 84 U/L (ref 45–120)
ALT SERPL W P-5'-P-CCNC: 61 U/L (ref 0–45)
ANION GAP SERPL CALCULATED.3IONS-SCNC: 14 MMOL/L (ref 5–18)
AST SERPL W P-5'-P-CCNC: 32 U/L (ref 0–40)
BILIRUB SERPL-MCNC: 0.9 MG/DL (ref 0–1)
BUN SERPL-MCNC: 13 MG/DL (ref 8–22)
CALCIUM SERPL-MCNC: 9.9 MG/DL (ref 8.5–10.5)
CHLORIDE BLD-SCNC: 103 MMOL/L (ref 98–107)
CHOLEST SERPL-MCNC: 140 MG/DL
CO2 SERPL-SCNC: 26 MMOL/L (ref 22–31)
CREAT SERPL-MCNC: 0.83 MG/DL (ref 0.6–1.1)
ERYTHROCYTE [DISTWIDTH] IN BLOOD BY AUTOMATED COUNT: 12.9 % (ref 10–15)
FASTING STATUS PATIENT QL REPORTED: YES
GFR SERPL CREATININE-BSD FRML MDRD: 76 ML/MIN/1.73M2
GLUCOSE BLD-MCNC: 132 MG/DL (ref 70–125)
HBA1C MFR BLD: 6.8 % (ref 0–5.6)
HCT VFR BLD AUTO: 48.8 % (ref 35–47)
HDLC SERPL-MCNC: 38 MG/DL
HGB BLD-MCNC: 16 G/DL (ref 11.7–15.7)
LDLC SERPL CALC-MCNC: 63 MG/DL
MCH RBC QN AUTO: 31.9 PG (ref 26.5–33)
MCHC RBC AUTO-ENTMCNC: 32.8 G/DL (ref 31.5–36.5)
MCV RBC AUTO: 97 FL (ref 78–100)
PLATELET # BLD AUTO: 231 10E3/UL (ref 150–450)
POTASSIUM BLD-SCNC: 4 MMOL/L (ref 3.5–5)
PROT SERPL-MCNC: 6.9 G/DL (ref 6–8)
RBC # BLD AUTO: 5.02 10E6/UL (ref 3.8–5.2)
SODIUM SERPL-SCNC: 143 MMOL/L (ref 136–145)
TRIGL SERPL-MCNC: 194 MG/DL
WBC # BLD AUTO: 7.3 10E3/UL (ref 4–11)

## 2021-11-22 PROCEDURE — 91306 COVID-19,PF,MODERNA (18+ YRS BOOSTER .25ML): CPT | Performed by: NURSE PRACTITIONER

## 2021-11-22 PROCEDURE — 36415 COLL VENOUS BLD VENIPUNCTURE: CPT | Performed by: NURSE PRACTITIONER

## 2021-11-22 PROCEDURE — G0008 ADMIN INFLUENZA VIRUS VAC: HCPCS | Performed by: NURSE PRACTITIONER

## 2021-11-22 PROCEDURE — 83036 HEMOGLOBIN GLYCOSYLATED A1C: CPT | Performed by: NURSE PRACTITIONER

## 2021-11-22 PROCEDURE — 90682 RIV4 VACC RECOMBINANT DNA IM: CPT | Performed by: NURSE PRACTITIONER

## 2021-11-22 PROCEDURE — 85027 COMPLETE CBC AUTOMATED: CPT | Performed by: NURSE PRACTITIONER

## 2021-11-22 PROCEDURE — 99214 OFFICE O/P EST MOD 30 MIN: CPT | Mod: 25 | Performed by: NURSE PRACTITIONER

## 2021-11-22 PROCEDURE — 80053 COMPREHEN METABOLIC PANEL: CPT | Performed by: NURSE PRACTITIONER

## 2021-11-22 PROCEDURE — 80061 LIPID PANEL: CPT | Performed by: NURSE PRACTITIONER

## 2021-11-22 PROCEDURE — 0064A COVID-19,PF,MODERNA (18+ YRS BOOSTER .25ML): CPT | Performed by: NURSE PRACTITIONER

## 2021-11-22 RX ORDER — FLUOXETINE 10 MG/1
10 CAPSULE ORAL DAILY
Qty: 90 CAPSULE | Refills: 2 | Status: SHIPPED | OUTPATIENT
Start: 2021-11-22 | End: 2022-09-14

## 2021-11-22 RX ORDER — HYDROCHLOROTHIAZIDE 25 MG/1
25 TABLET ORAL DAILY
Qty: 90 TABLET | Refills: 2 | Status: SHIPPED | OUTPATIENT
Start: 2021-11-22 | End: 2022-09-14

## 2021-11-22 RX ORDER — ATORVASTATIN CALCIUM 40 MG/1
TABLET, FILM COATED ORAL
Qty: 90 TABLET | Refills: 3 | Status: SHIPPED | OUTPATIENT
Start: 2021-11-22 | End: 2022-11-08

## 2021-11-22 ASSESSMENT — PATIENT HEALTH QUESTIONNAIRE - PHQ9
SUM OF ALL RESPONSES TO PHQ QUESTIONS 1-9: 0
10. IF YOU CHECKED OFF ANY PROBLEMS, HOW DIFFICULT HAVE THESE PROBLEMS MADE IT FOR YOU TO DO YOUR WORK, TAKE CARE OF THINGS AT HOME, OR GET ALONG WITH OTHER PEOPLE: NOT DIFFICULT AT ALL
SUM OF ALL RESPONSES TO PHQ QUESTIONS 1-9: 0

## 2021-11-22 ASSESSMENT — ACTIVITIES OF DAILY LIVING (ADL): CURRENT_FUNCTION: NO ASSISTANCE NEEDED

## 2021-11-22 ASSESSMENT — MIFFLIN-ST. JEOR: SCORE: 1686.89

## 2021-11-22 NOTE — PROGRESS NOTES
Assessment and Plan:     Benign essential hypertension  This is controlled.  She continues hydrochlorothiazide.  - hydrochlorothiazide (HYDRODIURIL) 25 MG tablet  Dispense: 90 tablet; Refill: 2    Mild episode of recurrent major depressive disorder (H)  Obtained PHQ-9 score of 0.  She continues fluoxetine.  - FLUoxetine (PROZAC) 10 MG capsule  Dispense: 90 capsule; Refill: 2    Mixed hyperlipidemia  Patient continues a atorvastatin.  We will check lipid cascade.  - Lipid panel reflex to direct LDL Fasting  - Lipid panel reflex to direct LDL Fasting  - atorvastatin (LIPITOR) 40 MG tablet  Dispense: 90 tablet; Refill: 3    Obstructive sleep apnea  Patient uses CPAP nightly.    Pulmonary nodules  She has a history of small pulmonary nodules.  We will recheck for stability.  - CT Chest w/o & w Contrast    Impaired fasting glucose  We will check A1c to rule out prediabetes or diabetes.  - Hemoglobin A1c    Elevated hemoglobin (H)  She has a history of a mildly elevated hemoglobin in January.  Will check hemogram.  - CBC with platelets    Morbid Obesity  Discussed weight loss goals.    High priority for 2019-nCoV vaccine  - COVID-19,PF,MODERNA (18+ Yrs BOOSTER .25mL)    Medication management  - Comprehensive metabolic panel (BMP + Alb, Alk Phos, ALT, AST, Total. Bili, TP)  - Comprehensive metabolic panel (BMP + Alb, Alk Phos, ALT, AST, Total. Bili, TP)    Smoking  Discussed smoking cessation options, but she declines.    Subjective:     Rochelle is a 61 year old female presenting to the clinic for medication management.  Patient has hyperlipidemia and is taking atorvastatin 40 mg daily.  She is trying to consume healthy diet and walks for exercise.  Patient states she walks minimally, though, because she experiences back pain when she walks for prolonged periods.  She has a history of a spinal fusion.  Patient is taking fluoxetine 10 mg daily for depression.  She feels as though her mood is stable.  She does occasionally feel  sadness around the holidays as she does not have a significant other children.  Patient travels to her siblings for the holidays.  Patient denies thoughts of suicide.  She does have a friend who is supportive of her.  Patient has a history of NADIA and uses a CPAP machine.  She does continue to smoke 10 to 12 cigarettes/day and states she is not ready to quit.  She is taking hydrochlorothiazide for lower extremity edema and hypertension which is controlled. She has a history of an impaired fasting glucose and elevated hemoglobin noted January 2021.     Reviewof Systems: A complete 14 point review of systems was obtained and is negative or as stated in the history of present illness.    Social History     Socioeconomic History     Marital status: Single     Spouse name: Not on file     Number of children: Not on file     Years of education: Not on file     Highest education level: Not on file   Occupational History     Not on file   Tobacco Use     Smoking status: Current Every Day Smoker     Packs/day: 0.50     Smokeless tobacco: Never Used   Substance and Sexual Activity     Alcohol use: No     Drug use: No     Sexual activity: Never   Other Topics Concern     Not on file   Social History Narrative     Not on file     Social Determinants of Health     Financial Resource Strain: Not on file   Food Insecurity: Not on file   Transportation Needs: Not on file   Physical Activity: Not on file   Stress: Not on file   Social Connections: Not on file   Intimate Partner Violence: Not on file   Housing Stability: Not on file       Active Ambulatory Problems     Diagnosis Date Noted     Osteoarthrosis      Rosacea      Insomnia      Edema      Allergies      Morbid Obesity      Major Depression, Recurrent      Obstructive sleep apnea      Hypertension      Diverticulitis Of Colon      Abnormal involuntary movement      Mixed hyperlipidemia 06/12/2017     Polyarthralgia 08/02/2017     Primary osteoarthritis involving multiple  "joints 08/02/2017     CMC DJD(carpometacarpal degenerative joint disease), localized primary 08/02/2017     Lumbar spondylosis 11/06/2017     Smoking 01/08/2021     Resolved Ambulatory Problems     Diagnosis Date Noted     Rheumatoid arthritis (H) 06/06/2016     Past Medical History:   Diagnosis Date     Anxiety      Depression      Hyperlipidemia      Hypertension      Ingrown toenail      Onychomycosis      Painful swelling of joint      Sleep apnea        Family History   Problem Relation Age of Onset     Breast Cancer Mother 49.00     Hypertension Father      Dementia Father      No Known Problems Sister      No Known Problems Daughter      No Known Problems Maternal Grandmother      No Known Problems Maternal Grandfather      Diabetes Paternal Grandmother      No Known Problems Paternal Grandfather      No Known Problems Maternal Aunt      No Known Problems Paternal Aunt      Hereditary Breast and Ovarian Cancer Syndrome No family hx of      Colon Cancer No family hx of      Endometrial Cancer No family hx of      Ovarian Cancer No family hx of        Objective:     /74 (BP Location: Left arm, Patient Position: Sitting, Cuff Size: Adult Regular)   Pulse 78   Ht 1.615 m (5' 3.58\")   Wt 114.9 kg (253 lb 3.2 oz)   SpO2 95%   BMI 44.03 kg/m      Patient is alert, in no obvious distress.   Skin: Warm, dry.  Multiple seborrheic keratosis lesions are present on her back.  HEENT:  Head normocephalic, atraumatic.  Eyes normal. Ears normal.  Nose patent, mucosa pink.  Oropharynx mucosa pink.  No lesions or tonsillar enlargement.   Neck: Supple, no lymphadenopathy, JVD, bruits noted.  No thyromegaly.  Lungs:  Clear to auscultation. Respirations even and unlabored.  No wheezing or rales noted.   Heart:  Regular rate and rhythm.  No murmurs, S3, S4, gallops, or rubs.    Musculoskeletal: No edema is present in bilateral lower extremities.              "

## 2021-11-23 ASSESSMENT — PATIENT HEALTH QUESTIONNAIRE - PHQ9: SUM OF ALL RESPONSES TO PHQ QUESTIONS 1-9: 0

## 2021-12-05 ENCOUNTER — HOSPITAL ENCOUNTER (OUTPATIENT)
Dept: CT IMAGING | Facility: HOSPITAL | Age: 61
Discharge: HOME | End: 2021-12-05
Attending: NURSE PRACTITIONER | Admitting: NURSE PRACTITIONER
Payer: COMMERCIAL

## 2021-12-05 DIAGNOSIS — R91.8 PULMONARY NODULES: ICD-10-CM

## 2021-12-05 PROCEDURE — 71250 CT THORAX DX C-: CPT

## 2021-12-06 ENCOUNTER — TELEPHONE (OUTPATIENT)
Dept: FAMILY MEDICINE | Facility: CLINIC | Age: 61
End: 2021-12-06
Payer: MEDICARE

## 2021-12-06 NOTE — TELEPHONE ENCOUNTER
----- Message from ERIC Fairchild CNP sent at 12/5/2021  4:39 PM CST -----  Please notify the patient that her A1C is concerning for diabetes.  Please have her follow-up in clinic to discuss a treatment plan.  I would also like to review with her some of her other labs.  Please schedule 40 minute appt. Thanks.

## 2021-12-13 ENCOUNTER — OFFICE VISIT (OUTPATIENT)
Dept: FAMILY MEDICINE | Facility: CLINIC | Age: 61
End: 2021-12-13
Payer: COMMERCIAL

## 2021-12-13 VITALS — DIASTOLIC BLOOD PRESSURE: 73 MMHG | HEART RATE: 70 BPM | SYSTOLIC BLOOD PRESSURE: 117 MMHG | OXYGEN SATURATION: 96 %

## 2021-12-13 DIAGNOSIS — R73.01 IMPAIRED FASTING GLUCOSE: ICD-10-CM

## 2021-12-13 DIAGNOSIS — S20.212A RIB CONTUSION, LEFT, INITIAL ENCOUNTER: Primary | ICD-10-CM

## 2021-12-13 DIAGNOSIS — F17.200 SMOKING: ICD-10-CM

## 2021-12-13 DIAGNOSIS — D58.2 ELEVATED HEMOGLOBIN (H): ICD-10-CM

## 2021-12-13 LAB
BASOPHILS # BLD AUTO: 0.1 10E3/UL (ref 0–0.2)
BASOPHILS NFR BLD AUTO: 1 %
CREAT UR-MCNC: 174 MG/DL
EOSINOPHIL # BLD AUTO: 0.2 10E3/UL (ref 0–0.7)
EOSINOPHIL NFR BLD AUTO: 3 %
ERYTHROCYTE [DISTWIDTH] IN BLOOD BY AUTOMATED COUNT: 13 % (ref 10–15)
HBA1C MFR BLD: 6.7 % (ref 0–5.6)
HCT VFR BLD AUTO: 50.8 % (ref 35–47)
HGB BLD-MCNC: 16.4 G/DL (ref 11.7–15.7)
HOLD SPECIMEN: NORMAL
IMM GRANULOCYTES # BLD: 0.1 10E3/UL
IMM GRANULOCYTES NFR BLD: 1 %
LYMPHOCYTES # BLD AUTO: 1.9 10E3/UL (ref 0.8–5.3)
LYMPHOCYTES NFR BLD AUTO: 25 %
MCH RBC QN AUTO: 31.4 PG (ref 26.5–33)
MCHC RBC AUTO-ENTMCNC: 32.3 G/DL (ref 31.5–36.5)
MCV RBC AUTO: 97 FL (ref 78–100)
MICROALBUMIN UR-MCNC: 1.67 MG/DL (ref 0–1.99)
MICROALBUMIN/CREAT UR: 9.6 MG/G CR
MONOCYTES # BLD AUTO: 0.5 10E3/UL (ref 0–1.3)
MONOCYTES NFR BLD AUTO: 7 %
NEUTROPHILS # BLD AUTO: 4.8 10E3/UL (ref 1.6–8.3)
NEUTROPHILS NFR BLD AUTO: 63 %
NRBC # BLD AUTO: 0 10E3/UL
NRBC BLD AUTO-RTO: 0 /100
PLATELET # BLD AUTO: 228 10E3/UL (ref 150–450)
RBC # BLD AUTO: 5.22 10E6/UL (ref 3.8–5.2)
RETICS # AUTO: 0.1 10E6/UL (ref 0.01–0.11)
RETICS/RBC NFR AUTO: 1.9 % (ref 0.8–2.7)
WBC # BLD AUTO: 7.6 10E3/UL (ref 4–11)

## 2021-12-13 PROCEDURE — 83036 HEMOGLOBIN GLYCOSYLATED A1C: CPT | Performed by: NURSE PRACTITIONER

## 2021-12-13 PROCEDURE — 82043 UR ALBUMIN QUANTITATIVE: CPT | Performed by: NURSE PRACTITIONER

## 2021-12-13 PROCEDURE — 36415 COLL VENOUS BLD VENIPUNCTURE: CPT | Performed by: NURSE PRACTITIONER

## 2021-12-13 PROCEDURE — 85045 AUTOMATED RETICULOCYTE COUNT: CPT | Performed by: NURSE PRACTITIONER

## 2021-12-13 PROCEDURE — 99214 OFFICE O/P EST MOD 30 MIN: CPT | Performed by: NURSE PRACTITIONER

## 2021-12-13 PROCEDURE — 85025 COMPLETE CBC W/AUTO DIFF WBC: CPT | Performed by: NURSE PRACTITIONER

## 2021-12-13 RX ORDER — OXYCODONE AND ACETAMINOPHEN 5; 325 MG/1; MG/1
1 TABLET ORAL EVERY 6 HOURS PRN
Qty: 10 TABLET | Refills: 0 | Status: SHIPPED | OUTPATIENT
Start: 2021-12-13 | End: 2021-12-16

## 2021-12-13 ASSESSMENT — ANXIETY QUESTIONNAIRES
5. BEING SO RESTLESS THAT IT IS HARD TO SIT STILL: NOT AT ALL
7. FEELING AFRAID AS IF SOMETHING AWFUL MIGHT HAPPEN: NOT AT ALL
GAD7 TOTAL SCORE: 0
4. TROUBLE RELAXING: NOT AT ALL
6. BECOMING EASILY ANNOYED OR IRRITABLE: NOT AT ALL
1. FEELING NERVOUS, ANXIOUS, OR ON EDGE: NOT AT ALL
7. FEELING AFRAID AS IF SOMETHING AWFUL MIGHT HAPPEN: NOT AT ALL
2. NOT BEING ABLE TO STOP OR CONTROL WORRYING: NOT AT ALL
3. WORRYING TOO MUCH ABOUT DIFFERENT THINGS: NOT AT ALL

## 2021-12-13 ASSESSMENT — PATIENT HEALTH QUESTIONNAIRE - PHQ9
SUM OF ALL RESPONSES TO PHQ QUESTIONS 1-9: 2
SUM OF ALL RESPONSES TO PHQ QUESTIONS 1-9: 2
10. IF YOU CHECKED OFF ANY PROBLEMS, HOW DIFFICULT HAVE THESE PROBLEMS MADE IT FOR YOU TO DO YOUR WORK, TAKE CARE OF THINGS AT HOME, OR GET ALONG WITH OTHER PEOPLE: NOT DIFFICULT AT ALL

## 2021-12-13 NOTE — PROGRESS NOTES
Assessment and Plan:     Rib contusion, left, initial encounter  X-ray shows no acute fracture.  Will have radiology review.  Patient complains of significant pain.  Will treat with Percocet as needed.  10 tablets provided.  She is to use this sparingly.  Educated on its occasions and side effects.  She is aware that this medication is addictive and habit-forming.  I encourage ibuprofen as needed.  If symptoms persist or worsen, she is to follow-up.  Discussed importance of deep breathing to prevent pneumonia.  - XR Ribs & Chest Left G/E 3 Views  - oxyCODONE-acetaminophen (PERCOCET) 5-325 MG tablet  Dispense: 10 tablet; Refill: 0  - Albumin Random Urine Quantitative with Creat Ratio  - Albumin Random Urine Quantitative with Creat Ratio    Impaired fasting glucose  Patient has a history of an elevated A1c.  We will recheck today.  If A1c is greater than 6.5%, this confirms type 2 diabetes.  Will initiate Metformin.  - Hemoglobin A1c  - Hemoglobin A1c  - Albumin Random Urine Quantitative with Creat Ratio  - Albumin Random Urine Quantitative with Creat Ratio    Elevated hemoglobin (H)  Patient has a history of smoking which may be a increase in the hemoglobin.  There are concerns regarding polycythemia vera.  Will obtain peripheral blood smear for further evaluation.  If hemoglobin is elevated, will refer to hematology for further evaluation.  - Lab Blood Morphology Pathologist Review  - Lab Blood Morphology Pathologist Review  - Albumin Random Urine Quantitative with Creat Ratio  - Albumin Random Urine Quantitative with Creat Ratio    Smoking  Discussed smoking cessation options, but she declines.  She plans on quitting on her own.  She is content with the plan.    Subjective:     Rochelle is a 61 year old female presenting to the clinic for follow-up on abnormal labs.  Patient was seen on 11/20/2021 where her A1c was 6.8%.  Patient was also noted to have a mildly elevated hemoglobin.  She does smoke and plans on  quitting.  Patient is taking atorvastatin for hyperlipidemia.  LDL was 63.  Patient is concerned of left rib injury.  She fell Saturday after she missed a step while she was cleaning off her car.  She states she fell on cement.  Since then she has had a constant ache within her left anterior rib cage.  Pain worsens with activity and taking a deep breath.  She had difficulty putting on her socks this morning.  She has been taking ibuprofen with minimal relief.    Reviewof Systems: A complete 14 point review of systems was obtained and is negative or as stated in the history of present illness.    Social History     Socioeconomic History     Marital status: Single     Spouse name: Not on file     Number of children: Not on file     Years of education: Not on file     Highest education level: Not on file   Occupational History     Not on file   Tobacco Use     Smoking status: Current Every Day Smoker     Packs/day: 0.50     Smokeless tobacco: Never Used   Substance and Sexual Activity     Alcohol use: No     Drug use: No     Sexual activity: Never   Other Topics Concern     Not on file   Social History Narrative     Not on file     Social Determinants of Health     Financial Resource Strain: Not on file   Food Insecurity: Not on file   Transportation Needs: Not on file   Physical Activity: Not on file   Stress: Not on file   Social Connections: Not on file   Intimate Partner Violence: Not on file   Housing Stability: Not on file       Active Ambulatory Problems     Diagnosis Date Noted     Osteoarthrosis      Rosacea      Insomnia      Edema      Allergies      Morbid Obesity      Major Depression, Recurrent      Obstructive sleep apnea      Hypertension      Diverticulitis Of Colon      Abnormal involuntary movement      Mixed hyperlipidemia 06/12/2017     Polyarthralgia 08/02/2017     Primary osteoarthritis involving multiple joints 08/02/2017     CMC DJD(carpometacarpal degenerative joint disease), localized primary  08/02/2017     Lumbar spondylosis 11/06/2017     Smoking 01/08/2021     Diabetes mellitus, type 2 (H) 11/22/2021     Resolved Ambulatory Problems     Diagnosis Date Noted     Rheumatoid arthritis (H) 06/06/2016     Past Medical History:   Diagnosis Date     Anxiety      Depression      Hyperlipidemia      Hypertension      Ingrown toenail      Onychomycosis      Painful swelling of joint      Sleep apnea        Family History   Problem Relation Age of Onset     Breast Cancer Mother 49.00     Hypertension Father      Dementia Father      No Known Problems Sister      No Known Problems Daughter      No Known Problems Maternal Grandmother      No Known Problems Maternal Grandfather      Diabetes Paternal Grandmother      No Known Problems Paternal Grandfather      No Known Problems Maternal Aunt      No Known Problems Paternal Aunt      Hereditary Breast and Ovarian Cancer Syndrome No family hx of      Colon Cancer No family hx of      Endometrial Cancer No family hx of      Ovarian Cancer No family hx of        Objective:     /73 (BP Location: Right arm, Patient Position: Sitting, Cuff Size: Adult Large)   Pulse 70   SpO2 96%     Patient is alert, in no obvious distress.   Skin: Warm, dry.    Lungs:  Clear to auscultation. Respirations even and unlabored.  No wheezing or rales noted.   Heart:  Regular rate and rhythm.  No murmurs, S3, S4, gallops, or rubs.    Musculoskeletal:  Full ROM of extremities.  She is tender to palpation of her left lower anterior rib cage.  No bruising or redness noted.    LABORATORY: I ordered and personally reviewed left x-rays showing no obvious fracture.  Will have radiology review.

## 2021-12-14 LAB
PATH REPORT.COMMENTS IMP SPEC: NORMAL
PATH REPORT.COMMENTS IMP SPEC: NORMAL
PATH REPORT.FINAL DX SPEC: NORMAL
PATH REPORT.MICROSCOPIC SPEC OTHER STN: NORMAL
PATH REPORT.RELEVANT HX SPEC: NORMAL

## 2021-12-14 ASSESSMENT — ANXIETY QUESTIONNAIRES: GAD7 TOTAL SCORE: 0

## 2021-12-14 ASSESSMENT — PATIENT HEALTH QUESTIONNAIRE - PHQ9: SUM OF ALL RESPONSES TO PHQ QUESTIONS 1-9: 2

## 2021-12-20 ENCOUNTER — TELEPHONE (OUTPATIENT)
Dept: FAMILY MEDICINE | Facility: CLINIC | Age: 61
End: 2021-12-20
Payer: MEDICARE

## 2021-12-20 NOTE — TELEPHONE ENCOUNTER
Patient calling states she was supposed to get a call regarding her lab results, but still has not received anything. Please advise on lab results and xray.     Pt can be reached by phone [phone]

## 2021-12-21 DIAGNOSIS — E11.9 TYPE 2 DIABETES MELLITUS WITHOUT COMPLICATION, WITHOUT LONG-TERM CURRENT USE OF INSULIN (H): ICD-10-CM

## 2021-12-21 DIAGNOSIS — D58.2 ELEVATED HEMOGLOBIN (H): Primary | ICD-10-CM

## 2021-12-22 ENCOUNTER — PATIENT OUTREACH (OUTPATIENT)
Dept: ONCOLOGY | Facility: CLINIC | Age: 61
End: 2021-12-22
Payer: MEDICARE

## 2021-12-22 NOTE — PROGRESS NOTES
Writer received referral for elevated Hgb. Reviewed for urgency based on labs and symptomology. Appropriate scheduling instructions added and referral sent to New Patient Scheduling for completion.

## 2022-01-04 ENCOUNTER — TELEPHONE (OUTPATIENT)
Dept: FAMILY MEDICINE | Facility: CLINIC | Age: 62
End: 2022-01-04
Payer: COMMERCIAL

## 2022-01-06 NOTE — PROGRESS NOTES
RECORDS STATUS - ALL OTHER DIAGNOSIS      RECORDS RECEIVED FROM: [facility]   DATE RECEIVED: 2/16/2022   NOTES STATUS DETAILS   OFFICE NOTE from referring provider Complete Epic   Leonora Dolan APRN CNP   OFFICE NOTE from medical oncologist N/A    DISCHARGE SUMMARY from hospital     DISCHARGE REPORT from the ER     OPERATIVE REPORT     MEDICATION LIST Complete [facility]   CLINICAL TRIAL TREATMENTS TO DATE     LABS     PATHOLOGY REPORTS     ANYTHING RELATED TO DIAGNOSIS Complete Labs last updated on 12/13/2021   GENONOMIC TESTING     TYPE:     IMAGING (NEED IMAGES & REPORT)     CT SCANS Complete CT Chest 12/5/2021   MRI     MAMMO     ULTRASOUND     PET

## 2022-01-11 ENCOUNTER — ALLIED HEALTH/NURSE VISIT (OUTPATIENT)
Dept: EDUCATION SERVICES | Facility: CLINIC | Age: 62
End: 2022-01-11
Payer: COMMERCIAL

## 2022-01-11 VITALS — WEIGHT: 250 LBS | BODY MASS INDEX: 43.48 KG/M2

## 2022-01-11 DIAGNOSIS — E11.9 TYPE 2 DIABETES MELLITUS WITHOUT COMPLICATION, WITHOUT LONG-TERM CURRENT USE OF INSULIN (H): ICD-10-CM

## 2022-01-11 PROCEDURE — G0108 DIAB MANAGE TRN  PER INDIV: HCPCS

## 2022-01-11 NOTE — LETTER
1/11/2022         RE: Rochelle Garza  [address]  [place] [address]        Dear Colleague,    Thank you for referring your patient, Rochelle Garza, to the [facility]. Please see a copy of my visit note below.    Diabetes Self-Management Education & Support    Presents for: Initial Assessment for new diagnosis    SUBJECTIVE/OBJECTIVE:  Presents for: Initial Assessment for new diagnosis  Accompanied by: Self  Diabetes education in the past 24mo: No  Focus of Visit: Healthy Eating  Diabetes type: Type 2  Date of diagnosis: a month ago  Disease course: Stable  Transportation concerns: No  Difficulty affording diabetes medication?: No  Difficulty affording diabetes testing supplies?: No  Other concerns:: Cognitive impairment  Cultural Influences/Ethnic Background:  Not  or     ASSESSMENT:  Rochelle is here alone today for her initial education for diabetes.  Her most recent A1c came back at 6.7%.  She has little family history of diabetes with only her grandma that she knows of.    She has started taking metformin and is taking 500 mg twice daily, 1 in the morning and 1 in the evening.  She has not been having any side effects.    She is not currently checking her blood sugar and is not interested in doing this at this time.    Rochelle has already made some changes to her meal plan.  We reviewed her current meal plan and this is noted below under the healthy eating section.    We did discuss activity.  He says activity is limited due to her back pain.  Stated she likes to cook but has difficulty standing for long periods of time.  She will walk around her apartment as well as walking the halls in her building.  Stated she is willing to try some steps but this is very difficult for her.    Plan:  Continue with current medication and lifestyle modifications.  Follow-up with me in 3 months.    *Much or all of the text in this note was generated through the use of the Dragon Dictate  "voice-to-text software. Errors in spelling or words which seem out of context are unintentional. Sound alike errors, in particular, may have escaped editing.      Diabetes Symptoms & Complications:  Fatigue: No  Neuropathy: No  Polydipsia: No  Polyphagia: No  Polyuria: No  Visual change: No  Slow healing wounds: No  Other: No  Symptom course: Stable  Weight trend: Stable  Complications assessed today?: No    Patient Problem List and Family Medical History reviewed for relevant medical history, current medical status, and diabetes risk factors.    Vitals:  Wt 113.4 kg (250 lb)   BMI 43.48 kg/m    Estimated body mass index is 43.48 kg/m  as calculated from the following:    Height as of 11/22/21: 1.615 m (5' 3.58\").    Weight as of this encounter: 113.4 kg (250 lb).   Last 3 BP:   BP Readings from Last 3 Encounters:   12/13/21 117/73   11/22/21 128/74   01/08/21 (!) 122/90       History   Smoking Status     Current Every Day Smoker     Types: Cigarettes   Smokeless Tobacco     Never Used       Labs:  Lab Results   Component Value Date    A1C 6.7 12/13/2021     Lab Results   Component Value Date     11/22/2021     Lab Results   Component Value Date    LDL 63 11/22/2021     Direct Measure HDL   Date Value Ref Range Status   11/22/2021 38 (L) >=50 mg/dL Final     Comment:     HDL Cholesterol Reference Range:     0-2 years:   No reference ranges established for patients under 2 years old  at [facility] for lipid analytes.    2-8 years:  Greater than 45 mg/dL     18 years and older:   Female: Greater than or equal to 50 mg/dL   Male:   Greater than or equal to 40 mg/dL   ]  GFR Estimate   Date Value Ref Range Status   11/22/2021 76 >60 mL/min/1.73m2 Final     Comment:     As of July 11, 2021, eGFR is calculated by the CKD-EPI creatinine equation, without race adjustment. eGFR can be influenced by muscle mass, exercise, and diet. The reported eGFR is an estimation only and is only applicable if the " renal function is stable.   01/08/2021 >60 >60 mL/min/1.73m2 Final     GFR Estimate If Black   Date Value Ref Range Status   01/08/2021 >60 >60 mL/min/1.73m2 Final     Lab Results   Component Value Date    CR 0.83 11/22/2021     No results found for: MICROALBUMIN    Healthy Eating:  Healthy Eating Assessed Today: Yes  Cultural/Adventism diet restrictions?: No (diverticulitis-no nuts or seeds)  Meal planning/habits: Smaller portions  How many times a week on average do you eat food made away from home (restaurant/take-out)?: 4  Breakfast: gapre nuts with 1% milk, rye toast with butter  Lunch: sandwich, meat and cheese  Dinner: last night veggies and hummus, TV dinners, spaghetti, hotdish, chicken  Snacks: crackers  Other: likes to cook.  Harder with back pain, needs to take breaks.  Beverages: Water,Tea,Milk,Soda (cutting back on soda)  Has patient met with a dietitian in the past?: No    Being Active:  Being Active Assessed Today: No  Exercise:: Yes  How intense was your typical exercise? : Light (like stretching or slow walking)  Barrier to exercise: Physical limitation (back pain)    Monitoring:  Monitoring Assessed Today: No  Did patient bring glucose meter to appointment? : No    Taking Medications:  Diabetes Medication(s)     Biguanides       metFORMIN (GLUCOPHAGE) 500 MG tablet    Take 1 tablet (500 mg) by mouth 2 times daily (with meals)          Taking Medication Assessed Today: Yes  Current Treatments: Oral Medication (taken by mouth) (Metformin 500mg BID)  Problems taking diabetes medications regularly?: Yes  Diabetes medication side effects?: No    Problem Solving:  Is the patient at risk for hypoglycemia?: No  Is the patient at risk for DKA?: No    Reducing Risks:  Reducing Risks Assessed Today: Yes  Diabetes Risks: Age over 45 years,Sedentary Lifestyle  Has dilated eye exam at least once a year?: Yes (had one a couple weeks ago.  Everything looked fine except cataracts.)  Sees dentist every 6 months?:  Yes    Healthy Coping:  Healthy Coping Assessed Today: Yes  Emotional response to diabetes: Ready to learn,Acceptance,Confidence diabetes can be controlled  Informal Support system:: Friends,Neighbors  Patient Activation Measure Survey Score:  No flowsheet data found.    Diabetes knowledge and skills assessment:   Patient is knowledgeable in diabetes management concepts related to: Healthy Eating    Patient needs further education on the following diabetes management concepts: Healthy Eating, Being Active, Taking Medication and Healthy Coping    Based on learning assessment above, most appropriate setting for further diabetes education would be: Individual setting.      INTERVENTIONS:    Education provided today on:  AADE Self-Care Behaviors:  Healthy Eating: consistency in amount, composition, and timing of food intake, heart healthy diet and portion control  Being Active: relationship to blood glucose and describe appropriate activity program  Taking Medication: action of prescribed medication, side effects of prescribed medications and when to take medications  Reducing Risks: prevention, early diagnostic measures and treatment of complications, appropriate dental care, annual eye exam and A1C - goals, relating to blood glucose levels, how often to check  Healthy Coping: recognize feelings about diagnosis and methods for coping with stress    Opportunities for ongoing education and support in diabetes-self management were discussed.    Pt verbalized understanding of concepts discussed and recommendations provided today.       Education Materials Provided:  [employer] Healthy Living with Diabetes Book and Carbohydrate Counting      Patient's most recent   Lab Results   Component Value Date    A1C 6.7 12/13/2021    is meeting goal of <7.0    PLAN  See Patient Instructions for co-developed, patient-stated behavior change goals.  AVS printed and provided to patient today. See Follow-Up section for recommended  follow-up.     The service provided today was under the supervising provider, Harpal Jeronimo, who was available if needed.     Time Spent: 60 minutes  Encounter Type: Individual    Any diabetes medication dose changes were made via the CDE Protocol and Collaborative Practice Agreement with the patient's primary care provider. A copy of this encounter was shared with the provider.

## 2022-01-11 NOTE — PROGRESS NOTES
Diabetes Self-Management Education & Support    Presents for: Initial Assessment for new diagnosis    SUBJECTIVE/OBJECTIVE:  Presents for: Initial Assessment for new diagnosis  Accompanied by: Self  Diabetes education in the past 24mo: No  Focus of Visit: Healthy Eating  Diabetes type: Type 2  Date of diagnosis: a month ago  Disease course: Stable  Transportation concerns: No  Difficulty affording diabetes medication?: No  Difficulty affording diabetes testing supplies?: No  Other concerns:: Cognitive impairment  Cultural Influences/Ethnic Background:  Not  or     ASSESSMENT:  Rochelle is here alone today for her initial education for diabetes.  Her most recent A1c came back at 6.7%.  She has little family history of diabetes with only her grandma that she knows of.    She has started taking metformin and is taking 500 mg twice daily, 1 in the morning and 1 in the evening.  She has not been having any side effects.    She is not currently checking her blood sugar and is not interested in doing this at this time.    Rochelle has already made some changes to her meal plan.  We reviewed her current meal plan and this is noted below under the healthy eating section.    We did discuss activity.  He says activity is limited due to her back pain.  Stated she likes to cook but has difficulty standing for long periods of time.  She will walk around her apartment as well as walking the halls in her building.  Stated she is willing to try some steps but this is very difficult for her.    Plan:  Continue with current medication and lifestyle modifications.  Follow-up with me in 3 months.    *Much or all of the text in this note was generated through the use of the Dragon Dictate voice-to-text software. Errors in spelling or words which seem out of context are unintentional. Sound alike errors, in particular, may have escaped editing.      Diabetes Symptoms & Complications:  Fatigue: No  Neuropathy: No  Polydipsia:  "No  Polyphagia: No  Polyuria: No  Visual change: No  Slow healing wounds: No  Other: No  Symptom course: Stable  Weight trend: Stable  Complications assessed today?: No    Patient Problem List and Family Medical History reviewed for relevant medical history, current medical status, and diabetes risk factors.    Vitals:  Wt 113.4 kg (250 lb)   BMI 43.48 kg/m    Estimated body mass index is 43.48 kg/m  as calculated from the following:    Height as of 11/22/21: 1.615 m (5' 3.58\").    Weight as of this encounter: 113.4 kg (250 lb).   Last 3 BP:   BP Readings from Last 3 Encounters:   12/13/21 117/73   11/22/21 128/74   01/08/21 (!) 122/90       History   Smoking Status     Current Every Day Smoker     Types: Cigarettes   Smokeless Tobacco     Never Used       Labs:  Lab Results   Component Value Date    A1C 6.7 12/13/2021     Lab Results   Component Value Date     11/22/2021     Lab Results   Component Value Date    LDL 63 11/22/2021     Direct Measure HDL   Date Value Ref Range Status   11/22/2021 38 (L) >=50 mg/dL Final     Comment:     HDL Cholesterol Reference Range:     0-2 years:   No reference ranges established for patients under 2 years old  at [facility] for lipid analytes.    2-8 years:  Greater than 45 mg/dL     18 years and older:   Female: Greater than or equal to 50 mg/dL   Male:   Greater than or equal to 40 mg/dL   ]  GFR Estimate   Date Value Ref Range Status   11/22/2021 76 >60 mL/min/1.73m2 Final     Comment:     As of July 11, 2021, eGFR is calculated by the CKD-EPI creatinine equation, without race adjustment. eGFR can be influenced by muscle mass, exercise, and diet. The reported eGFR is an estimation only and is only applicable if the renal function is stable.   01/08/2021 >60 >60 mL/min/1.73m2 Final     GFR Estimate If Black   Date Value Ref Range Status   01/08/2021 >60 >60 mL/min/1.73m2 Final     Lab Results   Component Value Date    CR 0.83 11/22/2021     No results " found for: MICROALBUMIN    Healthy Eating:  Healthy Eating Assessed Today: Yes  Cultural/Episcopal diet restrictions?: No (diverticulitis-no nuts or seeds)  Meal planning/habits: Smaller portions  How many times a week on average do you eat food made away from home (restaurant/take-out)?: 4  Breakfast: gapre nuts with 1% milk, rye toast with butter  Lunch: sandwich, meat and cheese  Dinner: last night veggies and hummus, TV dinners, spaghetti, hotdish, chicken  Snacks: crackers  Other: likes to cook.  Harder with back pain, needs to take breaks.  Beverages: Water,Tea,Milk,Soda (cutting back on soda)  Has patient met with a dietitian in the past?: No    Being Active:  Being Active Assessed Today: No  Exercise:: Yes  How intense was your typical exercise? : Light (like stretching or slow walking)  Barrier to exercise: Physical limitation (back pain)    Monitoring:  Monitoring Assessed Today: No  Did patient bring glucose meter to appointment? : No    Taking Medications:  Diabetes Medication(s)     Biguanides       metFORMIN (GLUCOPHAGE) 500 MG tablet    Take 1 tablet (500 mg) by mouth 2 times daily (with meals)          Taking Medication Assessed Today: Yes  Current Treatments: Oral Medication (taken by mouth) (Metformin 500mg BID)  Problems taking diabetes medications regularly?: Yes  Diabetes medication side effects?: No    Problem Solving:  Is the patient at risk for hypoglycemia?: No  Is the patient at risk for DKA?: No    Reducing Risks:  Reducing Risks Assessed Today: Yes  Diabetes Risks: Age over 45 years,Sedentary Lifestyle  Has dilated eye exam at least once a year?: Yes (had one a couple weeks ago.  Everything looked fine except cataracts.)  Sees dentist every 6 months?: Yes    Healthy Coping:  Healthy Coping Assessed Today: Yes  Emotional response to diabetes: Ready to learn,Acceptance,Confidence diabetes can be controlled  Informal Support system:: Friends,Neighbors  Patient Activation Measure Survey  Score:  No flowsheet data found.    Diabetes knowledge and skills assessment:   Patient is knowledgeable in diabetes management concepts related to: Healthy Eating    Patient needs further education on the following diabetes management concepts: Healthy Eating, Being Active, Taking Medication and Healthy Coping    Based on learning assessment above, most appropriate setting for further diabetes education would be: Individual setting.      INTERVENTIONS:    Education provided today on:  AADE Self-Care Behaviors:  Healthy Eating: consistency in amount, composition, and timing of food intake, heart healthy diet and portion control  Being Active: relationship to blood glucose and describe appropriate activity program  Taking Medication: action of prescribed medication, side effects of prescribed medications and when to take medications  Reducing Risks: prevention, early diagnostic measures and treatment of complications, appropriate dental care, annual eye exam and A1C - goals, relating to blood glucose levels, how often to check  Healthy Coping: recognize feelings about diagnosis and methods for coping with stress    Opportunities for ongoing education and support in diabetes-self management were discussed.    Pt verbalized understanding of concepts discussed and recommendations provided today.       Education Materials Provided:  [employer] Healthy Living with Diabetes Book and Carbohydrate Counting      Patient's most recent   Lab Results   Component Value Date    A1C 6.7 12/13/2021    is meeting goal of <7.0    PLAN  See Patient Instructions for co-developed, patient-stated behavior change goals.  AVS printed and provided to patient today. See Follow-Up section for recommended follow-up.     The service provided today was under the supervising provider, Harpal Jeronimo, who was available if needed.     Time Spent: 60 minutes  Encounter Type: Individual    Any diabetes medication dose changes were made via the  CDE Protocol and Collaborative Practice Agreement with the patient's primary care provider. A copy of this encounter was shared with the provider.

## 2022-02-04 ENCOUNTER — OFFICE VISIT (OUTPATIENT)
Dept: FAMILY MEDICINE | Facility: CLINIC | Age: 62
End: 2022-02-04
Payer: COMMERCIAL

## 2022-02-04 VITALS
DIASTOLIC BLOOD PRESSURE: 80 MMHG | RESPIRATION RATE: 24 BRPM | HEIGHT: 64 IN | WEIGHT: 246 LBS | HEART RATE: 90 BPM | SYSTOLIC BLOOD PRESSURE: 132 MMHG | BODY MASS INDEX: 42 KG/M2

## 2022-02-04 DIAGNOSIS — F33.0 MILD EPISODE OF RECURRENT MAJOR DEPRESSIVE DISORDER (H): ICD-10-CM

## 2022-02-04 DIAGNOSIS — Z01.818 PRE-OP EXAM: Primary | ICD-10-CM

## 2022-02-04 DIAGNOSIS — H26.9 CATARACT OF BOTH EYES, UNSPECIFIED CATARACT TYPE: ICD-10-CM

## 2022-02-04 DIAGNOSIS — I73.9 PAD (PERIPHERAL ARTERY DISEASE) (H): ICD-10-CM

## 2022-02-04 DIAGNOSIS — E11.9 TYPE 2 DIABETES MELLITUS WITHOUT COMPLICATION, WITHOUT LONG-TERM CURRENT USE OF INSULIN (H): ICD-10-CM

## 2022-02-04 DIAGNOSIS — E66.01 MORBID OBESITY (H): ICD-10-CM

## 2022-02-04 DIAGNOSIS — G47.33 OBSTRUCTIVE SLEEP APNEA: ICD-10-CM

## 2022-02-04 DIAGNOSIS — D58.2 ELEVATED HEMOGLOBIN (H): ICD-10-CM

## 2022-02-04 DIAGNOSIS — I10 ESSENTIAL HYPERTENSION: ICD-10-CM

## 2022-02-04 DIAGNOSIS — F17.200 SMOKING: ICD-10-CM

## 2022-02-04 DIAGNOSIS — E78.2 MIXED HYPERLIPIDEMIA: ICD-10-CM

## 2022-02-04 PROCEDURE — 99214 OFFICE O/P EST MOD 30 MIN: CPT | Performed by: NURSE PRACTITIONER

## 2022-02-04 RX ORDER — PREDNISOLONE ACETATE 10 MG/ML
SUSPENSION/ DROPS OPHTHALMIC
COMMUNITY
Start: 2022-01-26 | End: 2022-04-27

## 2022-02-04 ASSESSMENT — MIFFLIN-ST. JEOR: SCORE: 1660.85

## 2022-02-04 NOTE — PROGRESS NOTES
[facility]  [address]   [facility] [address]  Phone: [phone]  Fax: [phone]  Primary Provider: Leonora Dolan        PREOPERATIVE EVALUATION:  Today's date: 2/4/2022    Rochelle Garza is a 61 year old female who presents for a preoperative evaluation.    Surgical Information:  Surgery/Procedure: cataracts on left eye   Surgery Location: [facility]   Surgeon:   Surgery Date: 2/9/22  Time of Surgery:   Where patient plans to recover: At home with family  Fax number for surgical facility: [phone]    Type of Anesthesia Anticipated: to be determined    Assessment & Plan     The proposed surgical procedure is considered LOW risk.    Pre-op exam  Preop exam performed.  There are no contraindications for surgery.    Cataract of both eyes, unspecified cataract type    Type 2 diabetes mellitus without complication, without long-term current use of insulin (H)  This is controlled.  She will hold Metformin the morning of surgery.  May resume postop per surgeon.    Obstructive sleep apnea  She continues CPAP.    Hypertension  This is controlled.  She continues hydrochlorothiazide.    Mixed hyperlipidemia  She continues atorvastatin.    PAD (peripheral artery disease) (H)  She continues atorvastatin.  Recommend daily baby aspirin.    Elevated hemoglobin (H)  She sees hematology within the next few weeks.    Mild episode of recurrent major depressive disorder (H)  This is controlled with fluoxetine.    Morbid obesity (H)    Smoking  Discussed smoking cessation options, but she declines.             RECOMMENDATION:  APPROVAL GIVEN to proceed with proposed procedure, without further diagnostic evaluation.    Subjective     HPI related to upcoming procedure: Patient complains of decreased visual acuity over the past year.  She has been diagnosed with bilateral cataracts.  She has difficulty driving at night.  She denies any eye pain or trauma.    Patient is taking Metformin  for type 2 diabetes which is controlled.  Last A1c was 6.7% on 12/13/2021.  She is taking hydrochlorothiazide 25 mg daily for hypertension which is controlled.  She takes a atorvastatin for hyperlipidemia.  Depression is managed with fluoxetine.  She is seeing hematology within the next few weeks for an elevated hemoglobin.  She does smoke.      Preop Questions 2/4/2022   1. Have you ever had a heart attack or stroke? No   2. Have you ever had surgery on your heart or blood vessels, such as a stent placement, a coronary artery bypass, or surgery on an artery in your head, neck, heart, or legs? No   3. Do you have chest pain with activity? No   4. Do you have a history of  heart failure? No   5. Do you currently have a cold, bronchitis or symptoms of other infection? No   6. Do you have a cough, shortness of breath, or wheezing? No   7. Do you or anyone in your family have previous history of blood clots? No   8. Do you or does anyone in your family have a serious bleeding problem such as prolonged bleeding following surgeries or cuts? No   9. Have you ever had problems with anemia or been told to take iron pills? No   10. Have you had any abnormal blood loss such as black, tarry or bloody stools, or abnormal vaginal bleeding? No   11. Have you ever had a blood transfusion? No   11a. Have you ever had a transfusion reaction? No   12. Are you willing to have a blood transfusion if it is medically needed before, during, or after your surgery? Yes   13. Have you or any of your relatives ever had problems with anesthesia? No   14. Do you have sleep apnea, excessive snoring or daytime drowsiness? No   15. Do you have any artifical heart valves or other implanted medical devices like a pacemaker, defibrillator, or continuous glucose monitor? No   16. Do you have artificial joints? No   17. Are you allergic to latex? No       Health Care Directive:  Patient does not have a Health Care Directive or Living Will: Discussed  advance care planning with patient; however, patient declined at this time.    Preoperative Review of :   reviewed - was prescribed a narcotic for rib contusion.  She is not taking this anymore.       Status of Chronic Conditions:  See problem list for active medical problems.  Problems all longstanding and stable, except as noted/documented.  See ROS for pertinent symptoms related to these conditions.      Review of Systems  Constitutional, neuro, ENT, endocrine, pulmonary, cardiac, gastrointestinal, genitourinary, musculoskeletal, integument and psychiatric systems are negative, except as otherwise noted.    Patient Active Problem List    Diagnosis Date Noted     PAD (peripheral artery disease) (H) 02/04/2022     Priority: Medium     Elevated hemoglobin (H) 02/04/2022     Priority: Medium     Diabetes mellitus, type 2 (H) 11/22/2021     Priority: Medium     Smoking 01/08/2021     Priority: Medium     Abnormal involuntary movement      Priority: Medium     Created by Conversion         Obstructive sleep apnea      Priority: Medium     Created by Conversion         Lumbar spondylosis 11/06/2017     Priority: Medium     Polyarthralgia 08/02/2017     Priority: Medium     Primary osteoarthritis involving multiple joints 08/02/2017     Priority: Medium     CMC DJD(carpometacarpal degenerative joint disease), localized primary 08/02/2017     Priority: Medium     Mixed hyperlipidemia 06/12/2017     Priority: Medium     Osteoarthrosis      Priority: Medium     Created by Conversion  Replacement Utility updated for latest IMO load         Major Depression, Recurrent      Priority: Medium     Created by Conversion  Replacement Utility updated for latest IMO load         Hypertension      Priority: Medium     Created by Conversion  Replacement Utility updated for latest IMO load         Diverticulitis Of Colon      Priority: Medium     Created by Conversion  Replacement Utility updated for latest IMO load          Rosacea      Priority: Medium     Created by Conversion         Insomnia      Priority: Medium     Created by Conversion         Edema      Priority: Medium     Created by Conversion         Allergies      Priority: Medium     Created by Conversion         Morbid Obesity      Priority: Medium     Created by Conversion          Past Medical History:   Diagnosis Date     Anxiety      Depression      Hyperlipidemia      Hypertension      Ingrown toenail      Onychomycosis      Painful swelling of joint      Rheumatoid arthritis (H)      Sleep apnea     Using CPAP machine     Past Surgical History:   Procedure Laterality Date     ANKLE SURGERY Right      HERNIA REPAIR       LUMBAR FUSION  2006    [facility] at [facility]     SPINAL FUSION       [facility] APPENDECTOMY      Description: Appendectomy;  Recorded: 09/23/2008;     Current Outpatient Medications   Medication Sig Dispense Refill     atorvastatin (LIPITOR) 40 MG tablet [ATORVASTATIN (LIPITOR) 40 MG TABLET] TAKE 1 TABLET(40 MG) BY MOUTH DAILY 90 tablet 3     FLUoxetine (PROZAC) 10 MG capsule Take 1 capsule (10 mg) by mouth daily 90 capsule 2     fluticasone (FLONASE) 50 mcg/actuation nasal spray [FLUTICASONE (FLONASE) 50 MCG/ACTUATION NASAL SPRAY] SHAKE WELL AND USE 2 SPRAYS IN EACH NOSTRIL DAILY FOR 10 DAYS 48 g 3     hydrochlorothiazide (HYDRODIURIL) 25 MG tablet Take 1 tablet (25 mg) by mouth daily 90 tablet 2     ibuprofen (ADVIL,MOTRIN) 600 MG tablet [IBUPROFEN (ADVIL,MOTRIN) 600 MG TABLET] Take 1 tablet (600 mg total) by mouth every 6 (six) hours as needed for pain. 30 tablet 0     metFORMIN (GLUCOPHAGE) 500 MG tablet Take 1 tablet (500 mg) by mouth 2 times daily (with meals) 180 tablet 0     prednisoLONE acetate (PRED FORTE) 1 % ophthalmic suspension          Allergies   Allergen Reactions     Penicillins Unknown     Sulfites Unknown        Social History     Tobacco Use     Smoking status: Current Every Day Smoker     Types: Cigarettes     Smokeless tobacco: Never  "Used   Substance Use Topics     Alcohol use: No     Family History   Problem Relation Age of Onset     Breast Cancer Mother 49.00     Hypertension Father      Dementia Father      No Known Problems Sister      No Known Problems Daughter      No Known Problems Maternal Grandmother      No Known Problems Maternal Grandfather      Diabetes Paternal Grandmother      No Known Problems Paternal Grandfather      No Known Problems Maternal Aunt      No Known Problems Paternal Aunt      Hereditary Breast and Ovarian Cancer Syndrome No family hx of      Colon Cancer No family hx of      Endometrial Cancer No family hx of      Ovarian Cancer No family hx of      History   Drug Use No         Objective     /80 (BP Location: Right arm, Patient Position: Sitting, Cuff Size: Adult Regular)   Pulse 90   Resp 24   Ht 1.626 m (5' 4\")   Wt 111.6 kg (246 lb)   BMI 42.23 kg/m      Physical Exam    GENERAL APPEARANCE: healthy, alert and no distress     EYES: bilateral cataracts are present      HENT: ear canals and TM's normal and nose and mouth without ulcers or lesions     NECK: no adenopathy, no asymmetry, masses, or scars and thyroid normal to palpation     RESP: lungs clear to auscultation - no rales, rhonchi or wheezes     CV: regular rates and rhythm, normal S1 S2, no S3 or S4 and no murmur, click or rub     ABDOMEN:  soft, nontender, no HSM or masses and bowel sounds normal     MS: extremities normal- no gross deformities noted, no evidence of inflammation in joints, FROM in all extremities.     SKIN: no suspicious lesions or rashes     NEURO: Normal strength and tone, sensory exam grossly normal, mentation intact and speech normal     PSYCH: mentation appears normal. and affect normal/bright     LYMPHATICS: No cervical adenopathy    Recent Labs   Lab Test 12/13/21  1110 12/13/21  1033 11/22/21  1126 01/08/21  0953   HGB  --  16.4* 16.0* 16.4*   PLT  --  228 231 228   NA  --   --  143 141   POTASSIUM  --   --  4.0 4.0 "   CR  --   --  0.83 0.76   A1C 6.7*  --  6.8*  --         Diagnostics:  No labs were ordered during this visit.   No EKG required for low risk surgery (cataract, skin procedure, breast biopsy, etc).    Revised Cardiac Risk Index (RCRI):  The patient has the following serious cardiovascular risks for perioperative complications:   - No serious cardiac risks = 0 points     RCRI Interpretation: 0 points: Class I (very low risk - 0.4% complication rate)           Signed Electronically by: ERIC Fairchild CNP  Copy of this evaluation report is provided to requesting physician.

## 2022-02-16 ENCOUNTER — PRE VISIT (OUTPATIENT)
Dept: ONCOLOGY | Facility: HOSPITAL | Age: 62
End: 2022-02-16

## 2022-02-16 ENCOUNTER — APPOINTMENT (OUTPATIENT)
Dept: LAB | Facility: HOSPITAL | Age: 62
End: 2022-02-16
Attending: NURSE PRACTITIONER
Payer: COMMERCIAL

## 2022-02-16 ENCOUNTER — ONCOLOGY VISIT (OUTPATIENT)
Dept: ONCOLOGY | Facility: HOSPITAL | Age: 62
End: 2022-02-16
Attending: NURSE PRACTITIONER
Payer: COMMERCIAL

## 2022-02-16 ENCOUNTER — LAB (OUTPATIENT)
Dept: INFUSION THERAPY | Facility: HOSPITAL | Age: 62
End: 2022-02-16
Attending: NURSE PRACTITIONER
Payer: COMMERCIAL

## 2022-02-16 VITALS
SYSTOLIC BLOOD PRESSURE: 159 MMHG | TEMPERATURE: 98.4 F | DIASTOLIC BLOOD PRESSURE: 78 MMHG | WEIGHT: 243.6 LBS | OXYGEN SATURATION: 92 % | BODY MASS INDEX: 41.81 KG/M2 | RESPIRATION RATE: 20 BRPM | HEART RATE: 85 BPM

## 2022-02-16 DIAGNOSIS — D75.9 DISEASE OF BLOOD AND BLOOD-FORMING ORGANS, UNSPECIFIED: ICD-10-CM

## 2022-02-16 DIAGNOSIS — D58.2 ELEVATED HEMOGLOBIN (H): Primary | ICD-10-CM

## 2022-02-16 DIAGNOSIS — Z86.2 PERSONAL HISTORY OF DISEASES OF BLOOD AND BLOOD-FORMING ORGANS: ICD-10-CM

## 2022-02-16 LAB
ALBUMIN SERPL-MCNC: 4.1 G/DL (ref 3.5–5)
ALP SERPL-CCNC: 61 U/L (ref 45–120)
ALT SERPL W P-5'-P-CCNC: 51 U/L (ref 0–45)
ANION GAP SERPL CALCULATED.3IONS-SCNC: 8 MMOL/L (ref 5–18)
AST SERPL W P-5'-P-CCNC: 23 U/L (ref 0–40)
BASOPHILS # BLD AUTO: 0.1 10E3/UL (ref 0–0.2)
BASOPHILS NFR BLD AUTO: 1 %
BILIRUB SERPL-MCNC: 0.7 MG/DL (ref 0–1)
BUN SERPL-MCNC: 10 MG/DL (ref 8–22)
CALCIUM SERPL-MCNC: 9.8 MG/DL (ref 8.5–10.5)
CHLORIDE BLD-SCNC: 103 MMOL/L (ref 98–107)
CO2 SERPL-SCNC: 29 MMOL/L (ref 22–31)
CREAT SERPL-MCNC: 0.75 MG/DL (ref 0.6–1.1)
EOSINOPHIL # BLD AUTO: 0.3 10E3/UL (ref 0–0.7)
EOSINOPHIL NFR BLD AUTO: 4 %
ERYTHROCYTE [DISTWIDTH] IN BLOOD BY AUTOMATED COUNT: 13 % (ref 10–15)
GFR SERPL CREATININE-BSD FRML MDRD: 90 ML/MIN/1.73M2
GLUCOSE BLD-MCNC: 80 MG/DL (ref 70–125)
HCT VFR BLD AUTO: 48.3 % (ref 35–47)
HGB BLD-MCNC: 15.6 G/DL (ref 11.7–15.7)
IMM GRANULOCYTES # BLD: 0 10E3/UL
IMM GRANULOCYTES NFR BLD: 1 %
INTERPRETATION: NORMAL
LYMPHOCYTES # BLD AUTO: 1.9 10E3/UL (ref 0.8–5.3)
LYMPHOCYTES NFR BLD AUTO: 25 %
MCH RBC QN AUTO: 31.5 PG (ref 26.5–33)
MCHC RBC AUTO-ENTMCNC: 32.3 G/DL (ref 31.5–36.5)
MCV RBC AUTO: 97 FL (ref 78–100)
MONOCYTES # BLD AUTO: 0.6 10E3/UL (ref 0–1.3)
MONOCYTES NFR BLD AUTO: 7 %
NEUTROPHILS # BLD AUTO: 4.8 10E3/UL (ref 1.6–8.3)
NEUTROPHILS NFR BLD AUTO: 62 %
NRBC # BLD AUTO: 0 10E3/UL
NRBC BLD AUTO-RTO: 0 /100
PLATELET # BLD AUTO: 214 10E3/UL (ref 150–450)
POTASSIUM BLD-SCNC: 3.9 MMOL/L (ref 3.5–5)
PROT SERPL-MCNC: 6.9 G/DL (ref 6–8)
RBC # BLD AUTO: 4.96 10E6/UL (ref 3.8–5.2)
SIGNIFICANT RESULTS: NORMAL
SODIUM SERPL-SCNC: 140 MMOL/L (ref 136–145)
SPECIMEN DESCRIPTION: NORMAL
TEST DETAILS, MDL: NORMAL
WBC # BLD AUTO: 7.7 10E3/UL (ref 4–11)

## 2022-02-16 PROCEDURE — 81270 JAK2 GENE: CPT | Performed by: INTERNAL MEDICINE

## 2022-02-16 PROCEDURE — G0463 HOSPITAL OUTPT CLINIC VISIT: HCPCS

## 2022-02-16 PROCEDURE — 81219 CALR GENE COM VARIANTS: CPT

## 2022-02-16 PROCEDURE — 80053 COMPREHEN METABOLIC PANEL: CPT | Performed by: INTERNAL MEDICINE

## 2022-02-16 PROCEDURE — 99204 OFFICE O/P NEW MOD 45 MIN: CPT | Performed by: INTERNAL MEDICINE

## 2022-02-16 PROCEDURE — 36415 COLL VENOUS BLD VENIPUNCTURE: CPT | Performed by: INTERNAL MEDICINE

## 2022-02-16 PROCEDURE — 85025 COMPLETE CBC W/AUTO DIFF WBC: CPT | Performed by: INTERNAL MEDICINE

## 2022-02-16 PROCEDURE — 82668 ASSAY OF ERYTHROPOIETIN: CPT | Performed by: INTERNAL MEDICINE

## 2022-02-16 ASSESSMENT — PAIN SCALES - GENERAL: PAINLEVEL: NO PAIN (0)

## 2022-02-16 NOTE — PROGRESS NOTES
"Oncology Rooming Note    February 16, 2022 11:19 AM   Rochelle Garza is a 61 year old female who presents for:    Chief Complaint   Patient presents with     Oncology Clinic Visit   Eleveated Hemoglobin   Initial Vitals: BP (!) 159/78   Pulse 85   Temp 98.4  F (36.9  C)   Resp 20   Wt 110.5 kg (243 lb 9.6 oz)   SpO2 92%   BMI 41.81 kg/m   Estimated body mass index is 41.81 kg/m  as calculated from the following:    Height as of 2/4/22: 1.626 m (5' 4\").    Weight as of this encounter: 110.5 kg (243 lb 9.6 oz). Body surface area is 2.23 meters squared.  No Pain (0) Comment: Data Unavailable   No LMP recorded.  Allergies reviewed: Yes  Medications reviewed: Yes    Medications: Medication refills not needed today.  Pharmacy name entered into [employer]Oasis: [id number] - [place], [place] - [address] AT [facility]    Clinical concerns: None       Angie Swift LPN            "

## 2022-02-16 NOTE — PROGRESS NOTES
[facility] Hematology and Oncology Consult Note      Patient: Rochelle Garza  MRN: [id number]  Date of Service: Feb 16, 2022      We have been asked by Dr. Dolan to evaluate Rochelle Garza for an elevated hemoglobin.    Assessment/Plan:    1.  Erythrocytosis: She does have potential causes of secondary erythrocytosis such as sleep apnea and potentially COPD.  She is a smoker.  She does note that she wears her CPAP regularly.  Today we will evaluate her for polycythemia vera.  We will check a serum erythropoietin level and JAK2 mutation testing.  We will touch base with her in about a week and a half to review the results.  I told her if she does have a JAK2 mutation then we will start her on aspirin and Hydrea as well as phlebotomy to maintain hematocrit less than 45%.  Her questions were answered.    ECOG Performance  0    Staging History:    Cancer Staging  No matching staging information was found for the patient.    History:    Rochelle is a 59-year-old woman who is referred for an evaluation of an elevated hemoglobin. This is been present for about a year.  Hemoglobin is mildly elevated.  She has no other cytopenias or cytosis.  She denies any shortness of breath, rash, hot flashes, sweats, or flushing.  No acute complaints today.  She is in the midst of having cataract surgery done.    Past History:    Past Medical History:   Diagnosis Date     Anxiety      Depression      Hyperlipidemia      Hypertension      Ingrown toenail      Onychomycosis      Painful swelling of joint      Rheumatoid arthritis (H)      Sleep apnea     Using CPAP machine    Family History   Problem Relation Age of Onset     Breast Cancer Mother 49.00     Hypertension Father      Dementia Father      No Known Problems Sister      No Known Problems Daughter      No Known Problems Maternal Grandmother      No Known Problems Maternal Grandfather      Diabetes Paternal Grandmother      No Known Problems Paternal Grandfather      No Known Problems  Maternal Aunt      No Known Problems Paternal Aunt      Hereditary Breast and Ovarian Cancer Syndrome No family hx of      Colon Cancer No family hx of      Endometrial Cancer No family hx of      Ovarian Cancer No family hx of       [unfilled] Social History     Socioeconomic History     Marital status: Single     Spouse name: Not on file     Number of children: Not on file     Years of education: Not on file     Highest education level: Not on file   Occupational History     Not on file   Tobacco Use     Smoking status: Current Every Day Smoker     Types: Cigarettes     Smokeless tobacco: Never Used   Substance and Sexual Activity     Alcohol use: No     Drug use: No     Sexual activity: Never   Other Topics Concern     Not on file   Social History Narrative     Not on file     Social Determinants of Health     Financial Resource Strain: Not on file   Food Insecurity: Not on file   Transportation Needs: Not on file   Physical Activity: Not on file   Stress: Not on file   Social Connections: Not on file   Intimate Partner Violence: Not on file   Housing Stability: Not on file        Allergies:    Allergies   Allergen Reactions     Penicillins Unknown     Sulfites Unknown     Review of Systems:    As above in the history.     Review of Systems otherwise Negative for:  General: chills, fever or night sweats  Psychological: anxiety or depression  Ophthalmic: blurry vision, double vision or loss of vision, vision change  ENT: epistaxis, oral lesions, hearing changes  Hematological and Lymphatic: bleeding, bruising, jaundice, swollen lymph nodes  Endocrine: hot flashes, unexpected weight changes  Respiratory: cough, hemoptysis, orthopnea or shortness of breath/GARCIA  Cardiovascular: chest pain, edema, palpitations or PND  Gastrointestinal: abdominal pain, blood in stools, change in bowel habits, constipation, diarrhea or nausea/vomiting  Genito-Urinary: change in urinary stream, incontinence,  frequency/urgency  Musculoskeletal: joint pain, stiffness, swelling, muscle pain  Neurological: dizziness, headaches, numbness/tingling  Dermatological: lumps and rash    Physical Exam:    BP (!) 159/78   Pulse 85   Temp 98.4  F (36.9  C)   Resp 20   Wt 110.5 kg (243 lb 9.6 oz)   SpO2 92%   BMI 41.81 kg/m      General: patient appears stated age of 61 year old. Nontoxic and in no distress.   HEENT: Head: atraumatic, normocephalic. Sclerae anicteric.  Chest:  Normal respiratory effort  Cardiac:  No edema.   Abdomen: abdomen is non-distended  Extremities: normal tone and muscle bulk.   Skin: no lesions or rash on visible skin. Warm and dry.   CNS: alert and oriented. Grossly non-focal.   Psychiatric: normal mood and affect.     Lab Results:    No results found for this or any previous visit (from the past 168 hour(s)).    Imaging Results:    No results found.      Signed by: Shane Bonilla MD

## 2022-02-16 NOTE — LETTER
"    2/16/2022         RE: Rochelle Garza  [address]  [place] [address]        Dear Colleague,    Thank you for referring your patient, Rochelle Garza, to the [facility]. Please see a copy of my visit note below.    Oncology Rooming Note    February 16, 2022 11:19 AM   Rochelle Garza is a 61 year old female who presents for:    Chief Complaint   Patient presents with     Oncology Clinic Visit   Eleveated Hemoglobin   Initial Vitals: BP (!) 159/78   Pulse 85   Temp 98.4  F (36.9  C)   Resp 20   Wt 110.5 kg (243 lb 9.6 oz)   SpO2 92%   BMI 41.81 kg/m   Estimated body mass index is 41.81 kg/m  as calculated from the following:    Height as of 2/4/22: 1.626 m (5' 4\").    Weight as of this encounter: 110.5 kg (243 lb 9.6 oz). Body surface area is 2.23 meters squared.  No Pain (0) Comment: Data Unavailable   No LMP recorded.  Allergies reviewed: Yes  Medications reviewed: Yes    Medications: Medication refills not needed today.  Pharmacy name entered into Relux: [id number] - [place], [place] - [address] [facility]    Clinical concerns: None       Angie Swift LPN              [facility] Hematology and Oncology Consult Note      Patient: Rochelle Garza  MRN: [id number]  Date of Service: Feb 16, 2022      We have been asked by Dr. Dolan to evaluate Rochelle Garza for an elevated hemoglobin.    Assessment/Plan:    1.  Erythrocytosis: She does have potential causes of secondary erythrocytosis such as sleep apnea and potentially COPD.  She is a smoker.  She does note that she wears her CPAP regularly.  Today we will evaluate her for polycythemia vera.  We will check a serum erythropoietin level and JAK2 mutation testing.  We will touch base with her in about a week and a half to review the results.  I told her if she does have a JAK2 mutation then we will start her on aspirin and Hydrea as well as phlebotomy to maintain hematocrit " less than 45%.  Her questions were answered.    ECOG Performance  0    Staging History:    Cancer Staging  No matching staging information was found for the patient.    History:    Rochelle is a 59-year-old woman who is referred for an evaluation of an elevated hemoglobin. This is been present for about a year.  Hemoglobin is mildly elevated.  She has no other cytopenias or cytosis.  She denies any shortness of breath, rash, hot flashes, sweats, or flushing.  No acute complaints today.  She is in the midst of having cataract surgery done.    Past History:    Past Medical History:   Diagnosis Date     Anxiety      Depression      Hyperlipidemia      Hypertension      Ingrown toenail      Onychomycosis      Painful swelling of joint      Rheumatoid arthritis (H)      Sleep apnea     Using CPAP machine    Family History   Problem Relation Age of Onset     Breast Cancer Mother 49.00     Hypertension Father      Dementia Father      No Known Problems Sister      No Known Problems Daughter      No Known Problems Maternal Grandmother      No Known Problems Maternal Grandfather      Diabetes Paternal Grandmother      No Known Problems Paternal Grandfather      No Known Problems Maternal Aunt      No Known Problems Paternal Aunt      Hereditary Breast and Ovarian Cancer Syndrome No family hx of      Colon Cancer No family hx of      Endometrial Cancer No family hx of      Ovarian Cancer No family hx of       [unfilled] Social History     Socioeconomic History     Marital status: Single     Spouse name: Not on file     Number of children: Not on file     Years of education: Not on file     Highest education level: Not on file   Occupational History     Not on file   Tobacco Use     Smoking status: Current Every Day Smoker     Types: Cigarettes     Smokeless tobacco: Never Used   Substance and Sexual Activity     Alcohol use: No     Drug use: No     Sexual activity: Never   Other Topics Concern     Not on file   Social History  Narrative     Not on file     Social Determinants of Health     Financial Resource Strain: Not on file   Food Insecurity: Not on file   Transportation Needs: Not on file   Physical Activity: Not on file   Stress: Not on file   Social Connections: Not on file   Intimate Partner Violence: Not on file   Housing Stability: Not on file        Allergies:    Allergies   Allergen Reactions     Penicillins Unknown     Sulfites Unknown     Review of Systems:    As above in the history.     Review of Systems otherwise Negative for:  General: chills, fever or night sweats  Psychological: anxiety or depression  Ophthalmic: blurry vision, double vision or loss of vision, vision change  ENT: epistaxis, oral lesions, hearing changes  Hematological and Lymphatic: bleeding, bruising, jaundice, swollen lymph nodes  Endocrine: hot flashes, unexpected weight changes  Respiratory: cough, hemoptysis, orthopnea or shortness of breath/GARCIA  Cardiovascular: chest pain, edema, palpitations or PND  Gastrointestinal: abdominal pain, blood in stools, change in bowel habits, constipation, diarrhea or nausea/vomiting  Genito-Urinary: change in urinary stream, incontinence, frequency/urgency  Musculoskeletal: joint pain, stiffness, swelling, muscle pain  Neurological: dizziness, headaches, numbness/tingling  Dermatological: lumps and rash    Physical Exam:    BP (!) 159/78   Pulse 85   Temp 98.4  F (36.9  C)   Resp 20   Wt 110.5 kg (243 lb 9.6 oz)   SpO2 92%   BMI 41.81 kg/m      General: patient appears stated age of 61 year old. Nontoxic and in no distress.   HEENT: Head: atraumatic, normocephalic. Sclerae anicteric.  Chest:  Normal respiratory effort  Cardiac:  No edema.   Abdomen: abdomen is non-distended  Extremities: normal tone and muscle bulk.   Skin: no lesions or rash on visible skin. Warm and dry.   CNS: alert and oriented. Grossly non-focal.   Psychiatric: normal mood and affect.     Lab Results:    No results found for this or any  previous visit (from the past 168 hour(s)).    Imaging Results:    No results found.      Signed by: Shane Bonilla MD        Again, thank you for allowing me to participate in the care of your patient.        Sincerely,        Shane Bonilla MD

## 2022-02-17 LAB — EPO SERPL-ACNC: 12 MU/ML

## 2022-02-28 ENCOUNTER — VIRTUAL VISIT (OUTPATIENT)
Dept: ONCOLOGY | Facility: CLINIC | Age: 62
End: 2022-02-28
Attending: INTERNAL MEDICINE
Payer: COMMERCIAL

## 2022-02-28 DIAGNOSIS — D58.2 ELEVATED HEMOGLOBIN (H): Primary | ICD-10-CM

## 2022-02-28 NOTE — PROGRESS NOTES
Rochelle is a 61 year old who is being evaluated via a billable video visit.      How would you like to obtain your AVS? MyChart  If the video visit is dropped, the invitation should be resent by: Text to cell phone: [phone]  Will anyone else be joining your video visit? No      Video-Visit Details    Type of service:  Video Visit      Originating Location (pt. Location): Home    Distant Location (provider location):  [facility]     Platform used for Video Visit: [employer]

## 2022-02-28 NOTE — PROGRESS NOTES
"The patient has been notified of following:     \"This video visit will be conducted via a call between you and your physician/provider. We have found that certain health care needs can be provided without the need for an in-person physical exam.  This service lets us provide the care you need with a video conversation.  If a prescription is necessary we can send it directly to your pharmacy.  If lab work is needed we can place an order for that and you can then stop by our lab to have the test done at a later time.    Video visits are billed at different rates depending on your insurance coverage. Please reach out to your insurance provider with any questions.    If during the course of the call the physician/provider feels a video visit is not appropriate, you will not be charged for this service.\"    Patient has given verbal consent to a Video visit? Yes    Video-Visit Details    Type of service:  Video Visit    Video Start Time: 13:20  Video End Time (time video stopped): 13:25    Originating Location (pt. Location): Home    Distant Location (provider location):  [facility]     Platform used for Video Visit: [facility] Progress Note    Patient: Rochelle Garza  MRN: [id number]  Date of Service: Feb 28, 2022        Assessment and Plan:    1.  Erythrocytosis: She was seen on February 16.  At that time her hemoglobin was normal but the hematocrit was still a little high.  Unfortunately her JAK2 mutation testing is still not returned.  However, with a normal hemoglobin and normal serum erythropoietin level, I think it is unlikely that we will find a mutation.  If that is the case would recommend that she continues to wear her oxygen consistently, quit smoking, consistently wears CPAP and make sure that she her numbers throughout the right setting.  May also make sense to do a overnight pulse oximetry trial.  She would also not need any phlebotomy or " treatment if the JAK2 studies are negative.  If it turns out JAK2 is positive then we will discuss treatment for polycythemia vera.  We will call her when we get the JAK2's tests back.    ECOG Performance  0    Diagnosis:    1.  Erythrocytosis    Treatment:    To date.    Interim History:    Rochelle is seen in a video consult today to review lab test results.  She states that she is feeling okay.  No complaints.  She is now wearing her oxygen more consistently.    Review of Systems:    As above in the history.     Review of Systems otherwise Negative for:  General: chills, fever or night sweats  Psychological: anxiety or depression  Ophthalmic: blurry vision, double vision or loss of vision, vision change  ENT: epistaxis, oral lesions, hearing changes  Hematological and Lymphatic: bleeding, bruising, jaundice, swollen lymph nodes  Endocrine: hot flashes, unexpected weight changes  Respiratory: cough, hemoptysis, orthopnea or shortness of breath/GARCIA  Cardiovascular: chest pain, edema, palpitations or PND  Gastrointestinal: abdominal pain, blood in stools, change in bowel habits, constipation, diarrhea or nausea/vomiting  Genito-Urinary: change in urinary stream, incontinence, frequency/urgency  Musculoskeletal: joint pain, stiffness, swelling, muscle pain  Neurological: dizziness, headaches, numbness/tingling  Dermatological: lumps and rash    Past History:    Past Medical History:   Diagnosis Date     Anxiety      Depression      Hyperlipidemia      Hypertension      Ingrown toenail      Onychomycosis      Painful swelling of joint      Rheumatoid arthritis (H)      Sleep apnea     Using CPAP machine     Physical Exam:    There were no vitals taken for this visit.    General: patient appears stated age of 61 year old. Nontoxic and in no distress.     Lab Results:    No results found for this or any previous visit (from the past 168 hour(s)).     Imaging:    No results found.      Signed by: Shane Bonilla MD

## 2022-03-02 LAB
INTERPRETATION: NORMAL
SIGNIFICANT RESULTS: NORMAL
SPECIMEN DESCRIPTION: NORMAL
TEST DETAILS, MDL: NORMAL

## 2022-03-02 PROCEDURE — G0452 MOLECULAR PATHOLOGY INTERPR: HCPCS | Mod: 26 | Performed by: STUDENT IN AN ORGANIZED HEALTH CARE EDUCATION/TRAINING PROGRAM

## 2022-03-25 ENCOUNTER — TRANSFERRED RECORDS (OUTPATIENT)
Dept: MULTI SPECIALTY CLINIC | Facility: CLINIC | Age: 62
End: 2022-03-25
Payer: COMMERCIAL

## 2022-03-25 LAB — RETINOPATHY: NORMAL

## 2022-04-02 ENCOUNTER — HEALTH MAINTENANCE LETTER (OUTPATIENT)
Age: 62
End: 2022-04-02

## 2022-04-06 DIAGNOSIS — M47.816 LUMBAR SPONDYLOSIS: ICD-10-CM

## 2022-04-07 NOTE — TELEPHONE ENCOUNTER
"Routing refill request to provider for review/approval because:  Labs out of range:  BP, ALT, CBC    Last Written Prescription Date:  3/30/21  Last Fill Quantity: 30,  # refills: 0   Last office visit provider: 2/4/22       Requested Prescriptions   Pending Prescriptions Disp Refills     ibuprofen (ADVIL/MOTRIN) 600 MG tablet [Pharmacy Med Name: IBUPROFEN 600MG TABLETS] 30 tablet 0     Sig: TAKE 1 TABLET(600 MG) BY MOUTH EVERY 6 HOURS AS NEEDED FOR PAIN       NSAID Medications Failed - 4/6/2022  1:31 PM        Failed - Blood pressure under 140/90 in past 12 months     BP Readings from Last 3 Encounters:   02/16/22 (!) 159/78   02/04/22 132/80   12/13/21 117/73                 Failed - Normal ALT on file in past 12 months     Recent Labs   Lab Test 02/16/22  1213   ALT 51*             Failed - Normal CBC on file in past 12 months     Recent Labs   Lab Test 02/16/22  1213   WBC 7.7   RBC 4.96   HGB 15.6   HCT 48.3*                    Passed - Normal AST on file in past 12 months     Recent Labs   Lab Test 02/16/22  1213   AST 23             Passed - Recent (12 mo) or future (30 days) visit within the authorizing provider's specialty     Patient has had an office visit with the authorizing provider or a provider within the authorizing providers department within the previous 12 mos or has a future within next 30 days. See \"Patient Info\" tab in inbasket, or \"Choose Columns\" in Meds & Orders section of the refill encounter.              Passed - Patient is age 6-64 years        Passed - Medication is active on med list        Passed - No active pregnancy on record        Passed - Normal serum creatinine on file in past 12 months     Recent Labs   Lab Test 02/16/22  1213   CR 0.75       Ok to refill medication if creatinine is low          Passed - No positive pregnancy test in past 12 months             Marcela Martinez RN 04/07/22 2:31 PM  "

## 2022-04-10 RX ORDER — IBUPROFEN 600 MG/1
TABLET, FILM COATED ORAL
Qty: 30 TABLET | Refills: 0 | Status: SHIPPED | OUTPATIENT
Start: 2022-04-10 | End: 2023-02-14

## 2022-04-27 ENCOUNTER — OFFICE VISIT (OUTPATIENT)
Dept: FAMILY MEDICINE | Facility: CLINIC | Age: 62
End: 2022-04-27
Payer: COMMERCIAL

## 2022-04-27 VITALS
HEART RATE: 80 BPM | WEIGHT: 238.1 LBS | SYSTOLIC BLOOD PRESSURE: 114 MMHG | BODY MASS INDEX: 40.65 KG/M2 | DIASTOLIC BLOOD PRESSURE: 78 MMHG | HEIGHT: 64 IN | OXYGEN SATURATION: 94 %

## 2022-04-27 DIAGNOSIS — G47.33 OBSTRUCTIVE SLEEP APNEA: ICD-10-CM

## 2022-04-27 DIAGNOSIS — I10 ESSENTIAL HYPERTENSION: ICD-10-CM

## 2022-04-27 DIAGNOSIS — L91.8 SKIN TAG: ICD-10-CM

## 2022-04-27 DIAGNOSIS — E78.2 MIXED HYPERLIPIDEMIA: ICD-10-CM

## 2022-04-27 DIAGNOSIS — E11.9 TYPE 2 DIABETES MELLITUS WITHOUT COMPLICATION, WITHOUT LONG-TERM CURRENT USE OF INSULIN (H): Primary | ICD-10-CM

## 2022-04-27 LAB — HBA1C MFR BLD: 5.7 % (ref 0–5.6)

## 2022-04-27 PROCEDURE — 99214 OFFICE O/P EST MOD 30 MIN: CPT | Mod: 25 | Performed by: NURSE PRACTITIONER

## 2022-04-27 PROCEDURE — 36415 COLL VENOUS BLD VENIPUNCTURE: CPT | Performed by: NURSE PRACTITIONER

## 2022-04-27 PROCEDURE — 11200 RMVL SKIN TAGS UP TO&INC 15: CPT | Performed by: NURSE PRACTITIONER

## 2022-04-27 PROCEDURE — 83036 HEMOGLOBIN GLYCOSYLATED A1C: CPT | Performed by: NURSE PRACTITIONER

## 2022-04-27 NOTE — PROGRESS NOTES
Assessment and Plan:     Type 2 diabetes mellitus without complication, without long-term current use of insulin (H)  This is controlled.  We will check A1c.  She continues metformin.  - HEMOGLOBIN A1C  - metFORMIN (GLUCOPHAGE) 500 MG tablet  Dispense: 180 tablet; Refill: 1    Obstructive sleep apnea  She continues to use CPAP.    Mixed hyperlipidemia  LDL is at goal.  She continues atorvastatin.    Hypertension  This is controlled with hydrochlorothiazide.    Skin tag  Discussed proper wound care and signs of infection.  She will follow-up with any concerns.    Smoking  Discussed smoking cessation options, but she declines.  She is trying to quit on her own.  We discussed that due to her diabetes, she is at high risk of heart attack and stroke.  She is aware of this.    The patient is content with the plan and will follow-up in 6 months for medication management or sooner with any further concerns.         Subjective:     Rochelle is a 61 year old female presenting to the clinic for medication management.  Patient has type 2 diabetes which is controlled.  She is taking metformin 500 mg twice daily.  Last A1c was 6.7% on 12/13/2021.  She is trying to consume a healthy diet and walks for exercise.  She did complete diabetes education.  She has been gradually losing weight.  She denies any sores on her feet.  She recently had an eye exam and completed cataract surgery.  She is not checking her blood sugars.  She has NADIA and has been using a CPAP at home.  Blood pressure is controlled with hydrochlorothiazide 25 mg daily.  She is taking atorvastatin 40 mg daily for lipid management.  She does continue to smoke 1/2 pack/day and is trying to quit on her own.  She is not interested in treatment today.  Lastly, patient has a skin lesion within her mid chest along her bra line.  This is easily irritated and has been present for multiple months.      Answers for HPI/ROS submitted by the patient on 4/27/2022  Frequency of  checking blood sugars:: not at all  Diabetic concerns:: none  Paraesthesia present:: none of these symptoms  Have you had a diabetic eye exam within the last year?: Yes  Date of last eye exam:: 3/25/22  Where was this eye exam done?: [facility]  How many servings of fruits and vegetables do you eat daily?: 2-3  On average, how many sweetened beverages do you drink each day (Examples: soda, juice, sweet tea, etc.  Do NOT count diet or artificially sweetened beverages)?: 1  How many minutes a day do you exercise enough to make your heart beat faster?: 9 or less  How many days a week do you exercise enough to make your heart beat faster?: 3 or less  How many days per week do you miss taking your medication?: 0    Reviewof Systems: A complete 14 point review of systems was obtained and is negative or as stated in the history of present illness.    Social History     Socioeconomic History     Marital status: Single     Spouse name: Not on file     Number of children: Not on file     Years of education: Not on file     Highest education level: Not on file   Occupational History     Not on file   Tobacco Use     Smoking status: Current Every Day Smoker     Types: Cigarettes     Smokeless tobacco: Never Used   Substance and Sexual Activity     Alcohol use: No     Drug use: No     Sexual activity: Never   Other Topics Concern     Not on file   Social History Narrative     Not on file     Social Determinants of Health     Financial Resource Strain: Not on file   Food Insecurity: Not on file   Transportation Needs: Not on file   Physical Activity: Not on file   Stress: Not on file   Social Connections: Not on file   Intimate Partner Violence: Not on file   Housing Stability: Not on file       Active Ambulatory Problems     Diagnosis Date Noted     Osteoarthrosis      Rosacea      Insomnia      Edema      Allergies      Morbid Obesity      Major Depression, Recurrent      Obstructive sleep apnea      Hypertension       "Diverticulitis Of Colon      Abnormal involuntary movement      Mixed hyperlipidemia 06/12/2017     Polyarthralgia 08/02/2017     Primary osteoarthritis involving multiple joints 08/02/2017     CMC DJD(carpometacarpal degenerative joint disease), localized primary 08/02/2017     Lumbar spondylosis 11/06/2017     Smoking 01/08/2021     Diabetes mellitus, type 2 (H) 11/22/2021     PAD (peripheral artery disease) (H) 02/04/2022     Elevated hemoglobin (H) 02/04/2022     Resolved Ambulatory Problems     Diagnosis Date Noted     Rheumatoid arthritis (H) 06/06/2016     Past Medical History:   Diagnosis Date     Anxiety      Depression      Hyperlipidemia      Hypertension      Ingrown toenail      Onychomycosis      Painful swelling of joint      Sleep apnea        Family History   Problem Relation Age of Onset     Breast Cancer Mother 49.00     Hypertension Father      Dementia Father      No Known Problems Sister      No Known Problems Daughter      No Known Problems Maternal Grandmother      No Known Problems Maternal Grandfather      Diabetes Paternal Grandmother      No Known Problems Paternal Grandfather      No Known Problems Maternal Aunt      No Known Problems Paternal Aunt      Hereditary Breast and Ovarian Cancer Syndrome No family hx of      Colon Cancer No family hx of      Endometrial Cancer No family hx of      Ovarian Cancer No family hx of        Objective:     /78   Pulse 80   Ht 1.626 m (5' 4\")   Wt 108 kg (238 lb 1.6 oz)   SpO2 94%   BMI 40.87 kg/m      Patient is alert, in no obvious distress.   Skin: Warm, dry.  She has a large skin tag with a small stalk present along her bra line within her mid lower chest.  I cleansed the area with alcohol.  I used a straight scissors and remove this without difficulty.  Minimal bleeding was present.  I used a silver nitrate stick to cauterize the area.  Lungs:  Clear to auscultation. Respirations even and unlabored.  No wheezing or rales noted. "   Heart:  Regular rate and rhythm.  No murmurs, S3, S4, gallops, or rubs.    Abdomen: Soft, nontender.  No organomegaly. Bowel sounds normoactive. No guarding or masses noted.   Musculoskeletal: No edema is present bilateral lower extremities.  Monofilament testing is normal bilaterally.

## 2022-04-28 ENCOUNTER — TELEPHONE (OUTPATIENT)
Dept: FAMILY MEDICINE | Facility: CLINIC | Age: 62
End: 2022-04-28
Payer: COMMERCIAL

## 2022-04-28 NOTE — TELEPHONE ENCOUNTER
----- Message from ERIC Fairchild CNP sent at 4/27/2022  4:23 PM CDT -----  Please notify the patient that her A1C has greatly improved.  She is to keep up the good work!  I recommend a follow-up in six months.  Thanks.

## 2022-05-03 ENCOUNTER — ALLIED HEALTH/NURSE VISIT (OUTPATIENT)
Dept: EDUCATION SERVICES | Facility: CLINIC | Age: 62
End: 2022-05-03
Payer: COMMERCIAL

## 2022-05-03 DIAGNOSIS — E11.65 TYPE 2 DIABETES MELLITUS WITH HYPERGLYCEMIA, WITHOUT LONG-TERM CURRENT USE OF INSULIN (H): Primary | ICD-10-CM

## 2022-05-03 PROCEDURE — G0108 DIAB MANAGE TRN  PER INDIV: HCPCS

## 2022-05-03 NOTE — LETTER
"    5/3/2022         RE: Rochelle Garza  [address]  [place] [address]        Dear Colleague,    Thank you for referring your patient, Rochelle Garza, to the [facility]. Please see a copy of my visit note below.    Diabetes Self-Management Education & Support    Presents for:      SUBJECTIVE/OBJECTIVE:     Cultural Influences/Ethnic Background:  Not  or       Diabetes Symptoms & Complications:          Patient Problem List and Family Medical History reviewed for relevant medical history, current medical status, and diabetes risk factors.    Vitals:  There were no vitals taken for this visit.  Estimated body mass index is 40.87 kg/m  as calculated from the following:    Height as of 4/27/22: 1.626 m (5' 4\").    Weight as of 4/27/22: 108 kg (238 lb 1.6 oz).   Last 3 BP:   BP Readings from Last 3 Encounters:   04/27/22 114/78   02/16/22 (!) 159/78   02/04/22 132/80       History   Smoking Status     Current Every Day Smoker     Types: Cigarettes   Smokeless Tobacco     Never Used       Labs:  Lab Results   Component Value Date    A1C 5.7 04/27/2022     Lab Results   Component Value Date    GLC 80 02/16/2022     Lab Results   Component Value Date    LDL 63 11/22/2021     Direct Measure HDL   Date Value Ref Range Status   11/22/2021 38 (L) >=50 mg/dL Final     Comment:     HDL Cholesterol Reference Range:     0-2 years:   No reference ranges established for patients under 2 years old  at [facility] for lipid analytes.    2-8 years:  Greater than 45 mg/dL     18 years and older:   Female: Greater than or equal to 50 mg/dL   Male:   Greater than or equal to 40 mg/dL   ]  GFR Estimate   Date Value Ref Range Status   02/16/2022 90 >60 mL/min/1.73m2 Final     Comment:     Effective December 21, 2021 eGFRcr in adults is calculated using the 2021 CKD-EPI creatinine equation which includes age and gender (Johnnie garcia al., NEJM, DOI: 10.1056/ZTAMjf3812769)   01/08/2021 >60 >60 " mL/min/1.73m2 Final     GFR Estimate If Black   Date Value Ref Range Status   01/08/2021 >60 >60 mL/min/1.73m2 Final     Lab Results   Component Value Date    CR 0.75 02/16/2022     No results found for: MICROALBUMIN    Healthy Eating:       Being Active:       Monitoring:       Taking Medications:  Diabetes Medication(s)     Biguanides       metFORMIN (GLUCOPHAGE) 500 MG tablet    TAKE 1 TABLET(500 MG) BY MOUTH TWICE DAILY WITH MEALS            Problem Solving:       Reducing Risks:       Healthy Coping:     Patient Activation Measure Survey Score:  No flowsheet data found.    Diabetes knowledge and skills assessment:   Patient is knowledgeable in diabetes management concepts related to: Healthy Eating, Being Active and Taking Medication    Patient needs further education on the following diabetes management concepts: Healthy Eating, Being Active, Taking Medication, Problem Solving, Reducing Risks and Healthy Coping    Based on learning assessment above, most appropriate setting for further diabetes education would be: Individual setting.      INTERVENTIONS:    Education provided today on:  AADE Self-Care Behaviors:  Healthy Eating: weight reduction, eating out and portion control  Being Active: describe appropriate activity program and precautions to take  Taking Medication: side effects of prescribed medications and when to take medications  Problem Solving: low blood glucose - causes, signs/symptoms, treatment and prevention and carrying a carbohydrate source at all times  Reducing Risks: major complications of diabetes, prevention, early diagnostic measures and treatment of complications, foot care, appropriate dental care, annual eye exam, A1C - goals, relating to blood glucose levels, how often to check, lipids levels and goals and blood pressure and goals  Healthy Coping: recognize feelings about diagnosis, utilize support systems and methods for coping with stress    Opportunities for ongoing education and  support in diabetes-self management were discussed.    Pt verbalized understanding of concepts discussed and recommendations provided today.       Education Materials Provided:  No new materials provided today      ASSESSMENT:  Rochelle is here alone today for her follow up for Diabetes education.  She has lost about 12 pounds since January.    She is taking Metformin 500mg twice daily.  Stated she is taking this with meals.  She has not had any trouble with constipation, diarrhea or abdominal cramping.    She is not checking her blood sugars at this time.    Rochelle has been working on her meal planning and doing well.  We reviewed her current meal plan, which is noted below:  3 meals  B-mini muffin  nutrigrain bar  Cereal with milk-grapenuts  L-salad, premade from the store  1/2 slice bread with cheese  Pumpernickel or rye  D-TV dinners  Angireese Betts  S-small bags of chips  Crackers  Nuts  Avocado  Trying to follow list  She is working on counting carbs and watching her sweets intake.  She is doing quite well with this.    Rochelle has been walking in the stores to increase her activity.  She has done some walking in her building as well.  She has not been walking outside as she does not like to.  She continues to have back, knee and ankle pain.  Stated when she is doing cleaning or laundry she has to take brakes.  Her knees and ankles are a little better with her weight loss.    Stated she has been feeling better as well because her c-pap is working better again.  She is not napping during the day.    Eye exam-seeing in June  Dentist-been awhile, recommended  Foot exam-stated not yet    Plan:  Continue working on meal planning and activity.  Follow up in six months, sooner if problems with blood sugar or medication change.        Patient's most recent   Lab Results   Component Value Date    A1C 5.7 04/27/2022    is meeting goal of <7.0    PLAN  See Patient Instructions for co-developed, patient-stated behavior change  goals.  AVS printed and provided to patient today. See Follow-Up section for recommended follow-up.     The service provided today was under the supervising provider, Leonora Dolan, who was available if needed.     Time Spent: 30 minutes  Encounter Type: Individual    Any diabetes medication dose changes were made via the CDE Protocol and Collaborative Practice Agreement with the patient's referring provider. A copy of this encounter was shared with the provider.

## 2022-05-03 NOTE — CONFIDENTIAL NOTE
3 meals  B-mini muffin  nutrigrain bar  Cereal with milk  grapenuts  L-salad  premade salads from store  1/2 bread with cheese  Pumpernickel/rye  D- TV dinner  Angi Chappell  Chips-not a lot  Box of small bags  Triscuits  Ritz crackers  Nuts  Avocado  Trying to follow the list      Activity walk stores  Did some walking in apartment building  Too chilly to get outside  Still has back pain  Cleaning and laundry  Knees and ankles little better    Thinks oxygen wasn't working with c-pap  Good   No naps feels pretty good    No stress lately    Eye exam-June  Dentist-been awhile  Foot exam-due

## 2022-05-03 NOTE — PROGRESS NOTES
"Diabetes Self-Management Education & Support    Presents for:      SUBJECTIVE/OBJECTIVE:     Cultural Influences/Ethnic Background:  Not  or       Diabetes Symptoms & Complications:          Patient Problem List and Family Medical History reviewed for relevant medical history, current medical status, and diabetes risk factors.    Vitals:  There were no vitals taken for this visit.  Estimated body mass index is 40.87 kg/m  as calculated from the following:    Height as of 4/27/22: 1.626 m (5' 4\").    Weight as of 4/27/22: 108 kg (238 lb 1.6 oz).   Last 3 BP:   BP Readings from Last 3 Encounters:   04/27/22 114/78   02/16/22 (!) 159/78   02/04/22 132/80       History   Smoking Status     Current Every Day Smoker     Types: Cigarettes   Smokeless Tobacco     Never Used       Labs:  Lab Results   Component Value Date    A1C 5.7 04/27/2022     Lab Results   Component Value Date    GLC 80 02/16/2022     Lab Results   Component Value Date    LDL 63 11/22/2021     Direct Measure HDL   Date Value Ref Range Status   11/22/2021 38 (L) >=50 mg/dL Final     Comment:     HDL Cholesterol Reference Range:     0-2 years:   No reference ranges established for patients under 2 years old  [facility] analytes.    2-8 years:  Greater than 45 mg/dL     18 years and older:   Female: Greater than or equal to 50 mg/dL   Male:   Greater than or equal to 40 mg/dL   ]  GFR Estimate   Date Value Ref Range Status   02/16/2022 90 >60 mL/min/1.73m2 Final     Comment:     Effective December 21, 2021 eGFRcr in adults is calculated using the 2021 CKD-EPI creatinine equation which includes age and gender (Johnnie et al., NEJ, DOI: 10.1056/MMAWsx2985100)   01/08/2021 >60 >60 mL/min/1.73m2 Final     GFR Estimate If Black   Date Value Ref Range Status   01/08/2021 >60 >60 mL/min/1.73m2 Final     Lab Results   Component Value Date    CR 0.75 02/16/2022     No results found for: MICROALBUMIN    Healthy Eating:   "     Being Active:       Monitoring:       Taking Medications:  Diabetes Medication(s)     Biguanides       metFORMIN (GLUCOPHAGE) 500 MG tablet    TAKE 1 TABLET(500 MG) BY MOUTH TWICE DAILY WITH MEALS            Problem Solving:       Reducing Risks:       Healthy Coping:     Patient Activation Measure Survey Score:  No flowsheet data found.    Diabetes knowledge and skills assessment:   Patient is knowledgeable in diabetes management concepts related to: Healthy Eating, Being Active and Taking Medication    Patient needs further education on the following diabetes management concepts: Healthy Eating, Being Active, Taking Medication, Problem Solving, Reducing Risks and Healthy Coping    Based on learning assessment above, most appropriate setting for further diabetes education would be: Individual setting.      INTERVENTIONS:    Education provided today on:  AADE Self-Care Behaviors:  Healthy Eating: weight reduction, eating out and portion control  Being Active: describe appropriate activity program and precautions to take  Taking Medication: side effects of prescribed medications and when to take medications  Problem Solving: low blood glucose - causes, signs/symptoms, treatment and prevention and carrying a carbohydrate source at all times  Reducing Risks: major complications of diabetes, prevention, early diagnostic measures and treatment of complications, foot care, appropriate dental care, annual eye exam, A1C - goals, relating to blood glucose levels, how often to check, lipids levels and goals and blood pressure and goals  Healthy Coping: recognize feelings about diagnosis, utilize support systems and methods for coping with stress    Opportunities for ongoing education and support in diabetes-self management were discussed.    Pt verbalized understanding of concepts discussed and recommendations provided today.       Education Materials Provided:  No new materials provided today      ASSESSMENT:  Rochelle zeng  here alone today for her follow up for Diabetes education.  She has lost about 12 pounds since January.    She is taking Metformin 500mg twice daily.  Stated she is taking this with meals.  She has not had any trouble with constipation, diarrhea or abdominal cramping.    She is not checking her blood sugars at this time.    Rochelle has been working on her meal planning and doing well.  We reviewed her current meal plan, which is noted below:  3 meals  B-mini muffin  nutrigrain bar  Cereal with milk-grapenuts  L-salad, premade from the store  1/2 slice bread with cheese  Pumpernickel or rye  D-TV dinners  Angi Callendars  S-small bags of chips  Crackers  Nuts  Avocado  Trying to follow list  She is working on counting carbs and watching her sweets intake.  She is doing quite well with this.    Rochelle has been walking in the stores to increase her activity.  She has done some walking in her building as well.  She has not been walking outside as she does not like to.  She continues to have back, knee and ankle pain.  Stated when she is doing cleaning or laundry she has to take brakes.  Her knees and ankles are a little better with her weight loss.    Stated she has been feeling better as well because her c-pap is working better again.  She is not napping during the day.    Eye exam-seeing in June  Dentist-been awhile, recommended  Foot exam-stated not yet    Plan:  Continue working on meal planning and activity.  Follow up in six months, sooner if problems with blood sugar or medication change.        Patient's most recent   Lab Results   Component Value Date    A1C 5.7 04/27/2022    is meeting goal of <7.0    PLAN  See Patient Instructions for co-developed, patient-stated behavior change goals.  AVS printed and provided to patient today. See Follow-Up section for recommended follow-up.     The service provided today was under the supervising provider, Leonora Dolan, who was available if needed.     Time Spent: 30  minutes  Encounter Type: Individual    Any diabetes medication dose changes were made via the CDE Protocol and Collaborative Practice Agreement with the patient's referring provider. A copy of this encounter was shared with the provider.

## 2022-08-08 ENCOUNTER — TRANSFERRED RECORDS (OUTPATIENT)
Dept: HEALTH INFORMATION MANAGEMENT | Facility: CLINIC | Age: 62
End: 2022-08-08

## 2022-08-08 LAB — HEMOCCULT STL QL IA: NORMAL

## 2022-11-08 ENCOUNTER — OFFICE VISIT (OUTPATIENT)
Dept: FAMILY MEDICINE | Facility: CLINIC | Age: 62
End: 2022-11-08
Payer: COMMERCIAL

## 2022-11-08 VITALS
SYSTOLIC BLOOD PRESSURE: 122 MMHG | OXYGEN SATURATION: 96 % | HEART RATE: 80 BPM | BODY MASS INDEX: 40.34 KG/M2 | DIASTOLIC BLOOD PRESSURE: 78 MMHG | WEIGHT: 235 LBS

## 2022-11-08 DIAGNOSIS — E66.01 MORBID OBESITY (H): ICD-10-CM

## 2022-11-08 DIAGNOSIS — F33.0 MILD EPISODE OF RECURRENT MAJOR DEPRESSIVE DISORDER (H): ICD-10-CM

## 2022-11-08 DIAGNOSIS — E11.9 TYPE 2 DIABETES MELLITUS WITHOUT COMPLICATION, WITHOUT LONG-TERM CURRENT USE OF INSULIN (H): Primary | ICD-10-CM

## 2022-11-08 DIAGNOSIS — E78.2 MIXED HYPERLIPIDEMIA: ICD-10-CM

## 2022-11-08 DIAGNOSIS — I10 ESSENTIAL HYPERTENSION: ICD-10-CM

## 2022-11-08 DIAGNOSIS — Z23 HIGH PRIORITY FOR 2019-NCOV VACCINE: ICD-10-CM

## 2022-11-08 LAB
HBA1C MFR BLD: 5.7 % (ref 0–5.6)
HOLD SPECIMEN: NORMAL

## 2022-11-08 PROCEDURE — 91312 COVID-19,PF,PFIZER BOOSTER BIVALENT: CPT | Performed by: NURSE PRACTITIONER

## 2022-11-08 PROCEDURE — 36415 COLL VENOUS BLD VENIPUNCTURE: CPT | Performed by: NURSE PRACTITIONER

## 2022-11-08 PROCEDURE — 90682 RIV4 VACC RECOMBINANT DNA IM: CPT | Performed by: NURSE PRACTITIONER

## 2022-11-08 PROCEDURE — 83721 ASSAY OF BLOOD LIPOPROTEIN: CPT | Performed by: NURSE PRACTITIONER

## 2022-11-08 PROCEDURE — G0008 ADMIN INFLUENZA VIRUS VAC: HCPCS | Performed by: NURSE PRACTITIONER

## 2022-11-08 PROCEDURE — 0124A COVID-19,PF,PFIZER BOOSTER BIVALENT: CPT | Performed by: NURSE PRACTITIONER

## 2022-11-08 PROCEDURE — 83036 HEMOGLOBIN GLYCOSYLATED A1C: CPT | Performed by: NURSE PRACTITIONER

## 2022-11-08 PROCEDURE — 99214 OFFICE O/P EST MOD 30 MIN: CPT | Mod: 25 | Performed by: NURSE PRACTITIONER

## 2022-11-08 RX ORDER — ATORVASTATIN CALCIUM 40 MG/1
TABLET, FILM COATED ORAL
Qty: 90 TABLET | Refills: 3 | Status: SHIPPED | OUTPATIENT
Start: 2022-11-08 | End: 2023-05-11

## 2022-11-08 ASSESSMENT — PATIENT HEALTH QUESTIONNAIRE - PHQ9
10. IF YOU CHECKED OFF ANY PROBLEMS, HOW DIFFICULT HAVE THESE PROBLEMS MADE IT FOR YOU TO DO YOUR WORK, TAKE CARE OF THINGS AT HOME, OR GET ALONG WITH OTHER PEOPLE: NOT DIFFICULT AT ALL
SUM OF ALL RESPONSES TO PHQ QUESTIONS 1-9: 1
SUM OF ALL RESPONSES TO PHQ QUESTIONS 1-9: 1

## 2022-11-08 ASSESSMENT — PAIN SCALES - GENERAL: PAINLEVEL: NO PAIN (0)

## 2022-11-08 NOTE — PROGRESS NOTES
Assessment and Plan:     Type 2 diabetes mellitus without complication, without long-term current use of insulin (H)  This has been controlled.  We will check A1c today.  She continues metformin.  - Hemoglobin A1c  - metFORMIN (GLUCOPHAGE) 500 MG tablet  Dispense: 180 tablet; Refill: 1    Mild episode of recurrent major depressive disorder (H)  PHQ-9 score is 1.  This is controlled.  She continues fluoxetine.    Mixed hyperlipidemia  Goal LDL is less than 100.  She continues atorvastatin.  - LDL cholesterol direct  - LDL cholesterol direct  - atorvastatin (LIPITOR) 40 MG tablet  Dispense: 90 tablet; Refill: 3    Hypertension  This is controlled with hydrochlorothiazide.    Morbid Obesity  This is contributing to diabetes, hyperlipidemia, hypertension.  Recommend consuming a healthy diet and exercising.    High priority for 2019-nCoV vaccine  - COVID-19,PF,PFIZER BOOSTER BIVALENT 12+Yrs        Subjective:     Rochelle is a 62 year old female presenting to the clinic for medication management.  Patient has type 2 diabetes and is taking metformin 500 mg twice daily.  Last A1c was 5.7% on 4/27/2022.  She is tolerating the medication well.  She does not check her blood sugars.  She is try to consume healthy diet and has been walking for exercise.  She denies any sores on her feet.  Her last eye exam was 1 month ago.  Patient sees ophthalmology for glaucoma management.  Patient's blood pressure is well controlled with hydrochlorothiazide 25 mg daily.  She is taking fluoxetine 10 mg daily for depression.  She feels as though her mood is stable.  She denies thoughts of suicide.  She is taking atorvastatin for lipid management.  She is not fasting today.  She does continue to smoke 8 to 9 cigarettes/day.  She is adamant that she is not ready to quit smoking.    Answers for HPI/ROS submitted by the patient on 11/8/2022  If you checked off any problems, how difficult have these problems made it for you to do your work, take  care of things at home, or get along with other people?: Not difficult at all  PHQ9 TOTAL SCORE: 1  Frequency of checking blood sugars:: not at all  Diabetic concerns:: none  Paraesthesia present:: none of these symptoms  How many servings of fruits and vegetables do you eat daily?: 2-3  On average, how many sweetened beverages do you drink each day (Examples: soda, juice, sweet tea, etc.  Do NOT count diet or artificially sweetened beverages)?: 0  How many minutes a day do you exercise enough to make your heart beat faster?: 9 or less  How many days a week do you exercise enough to make your heart beat faster?: 3 or less  How many days per week do you miss taking your medication?: 0    Reviewof Systems: A complete 14 point review of systems was obtained and is negative or as stated in the history of present illness.    Social History     Socioeconomic History     Marital status: Single     Spouse name: Not on file     Number of children: Not on file     Years of education: Not on file     Highest education level: Not on file   Occupational History     Not on file   Tobacco Use     Smoking status: Every Day     Types: Cigarettes     Smokeless tobacco: Never   Substance and Sexual Activity     Alcohol use: No     Drug use: No     Sexual activity: Never   Other Topics Concern     Not on file   Social History Narrative     Not on file     Social Determinants of Health     Financial Resource Strain: Not on file   Food Insecurity: Not on file   Transportation Needs: Not on file   Physical Activity: Not on file   Stress: Not on file   Social Connections: Not on file   Intimate Partner Violence: Not on file   Housing Stability: Not on file       Active Ambulatory Problems     Diagnosis Date Noted     Osteoarthrosis      Rosacea      Insomnia      Edema      Allergies      Morbid Obesity      Major Depression, Recurrent      Obstructive sleep apnea      Hypertension      Diverticulitis Of Colon      Abnormal involuntary  movement      Mixed hyperlipidemia 06/12/2017     Polyarthralgia 08/02/2017     Primary osteoarthritis involving multiple joints 08/02/2017     CMC DJD(carpometacarpal degenerative joint disease), localized primary 08/02/2017     Lumbar spondylosis 11/06/2017     Smoking 01/08/2021     Diabetes mellitus, type 2 (H) 11/22/2021     PAD (peripheral artery disease) (H) 02/04/2022     Elevated hemoglobin (H) 02/04/2022     Resolved Ambulatory Problems     Diagnosis Date Noted     Rheumatoid arthritis (H) 06/06/2016     Past Medical History:   Diagnosis Date     Anxiety      Depression      Hyperlipidemia      Hypertension      Ingrown toenail      Onychomycosis      Painful swelling of joint      Sleep apnea        Family History   Problem Relation Age of Onset     Breast Cancer Mother 49.00     Hypertension Father      Dementia Father      No Known Problems Sister      No Known Problems Daughter      No Known Problems Maternal Grandmother      No Known Problems Maternal Grandfather      Diabetes Paternal Grandmother      No Known Problems Paternal Grandfather      No Known Problems Maternal Aunt      No Known Problems Paternal Aunt      Hereditary Breast and Ovarian Cancer Syndrome No family hx of      Colon Cancer No family hx of      Endometrial Cancer No family hx of      Ovarian Cancer No family hx of        Objective:     /78 (BP Location: Right arm, Patient Position: Sitting, Cuff Size: Adult Large)   Pulse 80   Wt 106.6 kg (235 lb)   SpO2 96%   BMI 40.34 kg/m      Patient is alert, in no obvious distress.   Skin: Warm, dry.    Lungs:  Clear to auscultation. Respirations even and unlabored.  No wheezing or rales noted.   Heart:  Regular rate and rhythm.  No murmurs, S3, S4, gallops, or rubs.    Musculoskeletal: No edema is present in bilateral lower extremities.

## 2022-11-08 NOTE — LETTER
November 9, 2022      Rochelle Garza  [address]  [place] [address]        Dear ,    We are writing to inform you of your test results.    Your A1C is great!! Keep up the good work!  Your LDL (bad cholesterol) is controlled.  Let's follow-up in six months.      Resulted Orders   Hemoglobin A1c   Result Value Ref Range    Hemoglobin A1C 5.7 (H) 0.0 - 5.6 %      Comment:      Normal <5.7%   Prediabetes 5.7-6.4%    Diabetes 6.5% or higher     Note: Adopted from ADA consensus guidelines.   LDL cholesterol direct   Result Value Ref Range    LDL Cholesterol Direct 73 <100 mg/dL      Comment:      Age 2-19 years:  Desirable: 0-110 mg/dL   Borderline high: 110-129 mg/dL   High: >= 130 mg/dL    Age 20 years and older:  Desirable: <100mg/dL  Above desirable: 100-129 mg/dL   Borderline high: 130-159 mg/dL   High: 160-189 mg/dL   Very high: >= 190 mg/dL       If you have any questions or concerns, please call the clinic at the number listed above.       Sincerely,      ERIC Fairchild CNP

## 2022-11-09 LAB — LDLC SERPL DIRECT ASSAY-MCNC: 73 MG/DL

## 2022-11-29 ENCOUNTER — TELEPHONE (OUTPATIENT)
Dept: FAMILY MEDICINE | Facility: CLINIC | Age: 62
End: 2022-11-29

## 2022-11-29 NOTE — TELEPHONE ENCOUNTER
I have not addressed this with her.  I do not have documentation to support the need for diabetic shoes.  She does not have an open wound or sore. She does not have peripheral neuropathy.  Thanks.

## 2022-11-29 NOTE — TELEPHONE ENCOUNTER
[facility] called looking for forms sent on 11/10 and 11/21 for diabetic shoes and inserts. Needs to be signed by MD or DO. Include clinic notes from most recent office visit.    Fax to [phone] ATTN: Katie

## 2022-12-08 NOTE — TELEPHONE ENCOUNTER
"[facility] called to check status. Leonora's message relayed. They're requesting that Leonora write \"no medical need\" on the form and fax it back so they can enter it in the chart.  "

## 2022-12-09 NOTE — TELEPHONE ENCOUNTER
Called [facility] and left message with Katie's assistant.     Please relay Leonora's message when Katie calls back. We need a new form.

## 2023-02-02 ENCOUNTER — TRANSFERRED RECORDS (OUTPATIENT)
Dept: HEALTH INFORMATION MANAGEMENT | Facility: CLINIC | Age: 63
End: 2023-02-02

## 2023-02-02 LAB
RETINOPATHY: NEGATIVE
RETINOPATHY: NEGATIVE

## 2023-02-14 DIAGNOSIS — M47.816 LUMBAR SPONDYLOSIS: ICD-10-CM

## 2023-02-14 NOTE — TELEPHONE ENCOUNTER
"Routing refill request to provider for review/approval because:  Labs not current:  ALT, CBC    Last Written Prescription Date:  4/10/2022  Last Fill Quantity: 30,  # refills: 0   Last office visit provider:  11/8/2022     Requested Prescriptions   Pending Prescriptions Disp Refills     ibuprofen (ADVIL/MOTRIN) 600 MG tablet [Pharmacy Med Name: IBUPROFEN 600MG TABLETS] 30 tablet 0     Sig: TAKE 1 TABLET(600 MG) BY MOUTH EVERY 6 HOURS AS NEEDED FOR PAIN       NSAID Medications Failed - 2/14/2023  6:19 AM        Failed - Normal ALT on file in past 12 months     Recent Labs   Lab Test 02/16/22  1213   ALT 51*             Failed - Normal CBC on file in past 12 months     Recent Labs   Lab Test 02/16/22  1213   WBC 7.7   RBC 4.96   HGB 15.6   HCT 48.3*                    Passed - Blood pressure under 140/90 in past 12 months     BP Readings from Last 3 Encounters:   11/08/22 122/78   04/27/22 114/78   02/16/22 (!) 159/78                 Passed - Normal AST on file in past 12 months     Recent Labs   Lab Test 02/16/22  1213   AST 23             Passed - Recent (12 mo) or future (30 days) visit within the authorizing provider's specialty     Patient has had an office visit with the authorizing provider or a provider within the authorizing providers department within the previous 12 mos or has a future within next 30 days. See \"Patient Info\" tab in inbasket, or \"Choose Columns\" in Meds & Orders section of the refill encounter.              Passed - Patient is age 6-64 years        Passed - Medication is active on med list        Passed - No active pregnancy on record        Passed - Normal serum creatinine on file in past 12 months     Recent Labs   Lab Test 02/16/22  1213   CR 0.75       Ok to refill medication if creatinine is low          Passed - No positive pregnancy test in past 12 months             Alissa Mattson RN 02/14/23 6:20 AM  "

## 2023-02-15 RX ORDER — IBUPROFEN 600 MG/1
TABLET, FILM COATED ORAL
Qty: 30 TABLET | Refills: 0 | Status: SHIPPED | OUTPATIENT
Start: 2023-02-15 | End: 2023-06-25

## 2023-05-02 DIAGNOSIS — E78.2 MIXED HYPERLIPIDEMIA: ICD-10-CM

## 2023-05-03 RX ORDER — ATORVASTATIN CALCIUM 40 MG/1
TABLET, FILM COATED ORAL
Qty: 90 TABLET | Refills: 3 | OUTPATIENT
Start: 2023-05-03

## 2023-05-11 ENCOUNTER — OFFICE VISIT (OUTPATIENT)
Dept: FAMILY MEDICINE | Facility: CLINIC | Age: 63
End: 2023-05-11
Payer: COMMERCIAL

## 2023-05-11 VITALS
BODY MASS INDEX: 39.07 KG/M2 | WEIGHT: 227.6 LBS | TEMPERATURE: 97.9 F | HEART RATE: 77 BPM | OXYGEN SATURATION: 89 % | RESPIRATION RATE: 20 BRPM | SYSTOLIC BLOOD PRESSURE: 129 MMHG | DIASTOLIC BLOOD PRESSURE: 83 MMHG

## 2023-05-11 DIAGNOSIS — Z79.899 MEDICATION MANAGEMENT: ICD-10-CM

## 2023-05-11 DIAGNOSIS — I73.9 PAD (PERIPHERAL ARTERY DISEASE) (H): ICD-10-CM

## 2023-05-11 DIAGNOSIS — I10 ESSENTIAL HYPERTENSION: ICD-10-CM

## 2023-05-11 DIAGNOSIS — E11.9 TYPE 2 DIABETES MELLITUS WITHOUT COMPLICATION, WITHOUT LONG-TERM CURRENT USE OF INSULIN (H): Primary | ICD-10-CM

## 2023-05-11 DIAGNOSIS — F33.0 MILD EPISODE OF RECURRENT MAJOR DEPRESSIVE DISORDER (H): ICD-10-CM

## 2023-05-11 DIAGNOSIS — F17.200 SMOKING: ICD-10-CM

## 2023-05-11 DIAGNOSIS — E66.01 MORBID OBESITY (H): ICD-10-CM

## 2023-05-11 DIAGNOSIS — Z12.31 VISIT FOR SCREENING MAMMOGRAM: ICD-10-CM

## 2023-05-11 DIAGNOSIS — E78.2 MIXED HYPERLIPIDEMIA: ICD-10-CM

## 2023-05-11 DIAGNOSIS — D58.2 ELEVATED HEMOGLOBIN (H): ICD-10-CM

## 2023-05-11 LAB
CREAT UR-MCNC: 37.5 MG/DL
ERYTHROCYTE [DISTWIDTH] IN BLOOD BY AUTOMATED COUNT: 13 % (ref 10–15)
HBA1C MFR BLD: 5.5 % (ref 0–5.6)
HCT VFR BLD AUTO: 48.8 % (ref 35–47)
HGB BLD-MCNC: 16.5 G/DL (ref 11.7–15.7)
MCH RBC QN AUTO: 31.8 PG (ref 26.5–33)
MCHC RBC AUTO-ENTMCNC: 33.8 G/DL (ref 31.5–36.5)
MCV RBC AUTO: 94 FL (ref 78–100)
MICROALBUMIN UR-MCNC: <12 MG/L
MICROALBUMIN/CREAT UR: NORMAL MG/G{CREAT}
PLATELET # BLD AUTO: 230 10E3/UL (ref 150–450)
RBC # BLD AUTO: 5.19 10E6/UL (ref 3.8–5.2)
WBC # BLD AUTO: 8.1 10E3/UL (ref 4–11)

## 2023-05-11 PROCEDURE — 80053 COMPREHEN METABOLIC PANEL: CPT | Performed by: NURSE PRACTITIONER

## 2023-05-11 PROCEDURE — 83036 HEMOGLOBIN GLYCOSYLATED A1C: CPT | Performed by: NURSE PRACTITIONER

## 2023-05-11 PROCEDURE — 82570 ASSAY OF URINE CREATININE: CPT | Performed by: NURSE PRACTITIONER

## 2023-05-11 PROCEDURE — 36415 COLL VENOUS BLD VENIPUNCTURE: CPT | Performed by: NURSE PRACTITIONER

## 2023-05-11 PROCEDURE — 80061 LIPID PANEL: CPT | Performed by: NURSE PRACTITIONER

## 2023-05-11 PROCEDURE — 82043 UR ALBUMIN QUANTITATIVE: CPT | Performed by: NURSE PRACTITIONER

## 2023-05-11 PROCEDURE — 99214 OFFICE O/P EST MOD 30 MIN: CPT | Performed by: NURSE PRACTITIONER

## 2023-05-11 PROCEDURE — 85027 COMPLETE CBC AUTOMATED: CPT | Performed by: NURSE PRACTITIONER

## 2023-05-11 RX ORDER — ATORVASTATIN CALCIUM 40 MG/1
TABLET, FILM COATED ORAL
Qty: 90 TABLET | Refills: 3 | Status: SHIPPED | OUTPATIENT
Start: 2023-05-11 | End: 2024-03-05

## 2023-05-11 ASSESSMENT — PATIENT HEALTH QUESTIONNAIRE - PHQ9
SUM OF ALL RESPONSES TO PHQ QUESTIONS 1-9: 1
10. IF YOU CHECKED OFF ANY PROBLEMS, HOW DIFFICULT HAVE THESE PROBLEMS MADE IT FOR YOU TO DO YOUR WORK, TAKE CARE OF THINGS AT HOME, OR GET ALONG WITH OTHER PEOPLE: NOT DIFFICULT AT ALL
SUM OF ALL RESPONSES TO PHQ QUESTIONS 1-9: 1

## 2023-05-11 ASSESSMENT — PAIN SCALES - GENERAL: PAINLEVEL: MODERATE PAIN (4)

## 2023-05-11 NOTE — PROGRESS NOTES
Assessment and Plan:     Type 2 diabetes mellitus without complication, without long-term current use of insulin (H)  We will check A1c.  She continues metformin.  This is controlled.  - Albumin Random Urine Quantitative with Creat Ratio  - HEMOGLOBIN A1C  - Albumin Random Urine Quantitative with Creat Ratio  - HEMOGLOBIN A1C    Mixed hyperlipidemia  We will check lipid cascade and adjust atorvastatin accordingly.  Goal LDL is less than 70.  - atorvastatin (LIPITOR) 40 MG tablet  Dispense: 90 tablet; Refill: 3  - Lipid panel reflex to direct LDL Non-fasting  - Lipid panel reflex to direct LDL Non-fasting    Essential hypertension  This is controlled with hydrochlorothiazide.    Mild episode of recurrent major depressive disorder (H)  Symptoms are managed with fluoxetine.  She does admit to some financial stressors.  Will refer to care management.    PAD (peripheral artery disease) (H)  Discussed smoking cessation options, but she declines.  I encouraged her to take a daily baby aspirin.  Goal LDL is less than 70.    Morbid obesity (H)  Recommend consuming a healthy diet and exercising.  This is contributing to diabetes, hyperlipidemia, hypertension.    Elevated hemoglobin (H)  We will check hemogram.  Elevated hemoglobin is likely due to smoking.  - CBC with platelets  - CBC with platelets    Smoking  Discussed smoking cessation options, but she declines.  She is not ready to quit smoking.    Visit for screening mammogram  - MA SCREENING DIGITAL BILAT - Future  (s+30)    Medication management  - Comprehensive metabolic panel (BMP + Alb, Alk Phos, ALT, AST, Total. Bili, TP)  - Comprehensive metabolic panel (BMP + Alb, Alk Phos, ALT, AST, Total. Bili, TP)        Subjective:     Rochelle is a 62 year old female presenting to the clinic for medication management.  Patient has multiple comorbidities including type 2 diabetes, hypertension, hyperlipidemia, PAD.  She has a history of an elevated hemoglobin and saw oncology.   No polycythemia vera or myeloproliferative disease noted.  Patient is taking metformin 500 mg twice daily.  She is tolerating the medication well.  She is trying consume a healthy diet.  She exercises by walking and performing housework.  She just does not check her blood sugars.  She denies any sores on her feet.  She is taking hydrochlorothiazide for blood pressure management.  Depression and anxiety symptoms are controlled with fluoxetine.  She does admit to some financial stressors.  She is taking atorvastatin for lipid management.  She continues to smoke 9 to 10 cigarettes/day and is not ready to quit.    Reviewof Systems: A complete 14 point review of systems was obtained and is negative or as stated in the history of present illness.    Social History     Socioeconomic History     Marital status: Single     Spouse name: Not on file     Number of children: Not on file     Years of education: Not on file     Highest education level: Not on file   Occupational History     Not on file   Tobacco Use     Smoking status: Every Day     Packs/day: 0.50     Years: 15.00     Pack years: 7.50     Types: Cigarettes     Smokeless tobacco: Never   Vaping Use     Vaping status: Not on file   Substance and Sexual Activity     Alcohol use: No     Drug use: No     Sexual activity: Never   Other Topics Concern     Not on file   Social History Narrative     Not on file     Social Determinants of Health     Financial Resource Strain: Not on file   Food Insecurity: Not on file   Transportation Needs: Not on file   Physical Activity: Not on file   Stress: Not on file   Social Connections: Not on file   Intimate Partner Violence: Not on file   Housing Stability: Not on file       Active Ambulatory Problems     Diagnosis Date Noted     Osteoarthrosis      Rosacea      Insomnia      Edema      Allergies      Morbid Obesity      Major Depression, Recurrent      Obstructive sleep apnea      Hypertension      Diverticulitis Of Colon       Abnormal involuntary movement      Mixed hyperlipidemia 06/12/2017     Polyarthralgia 08/02/2017     Primary osteoarthritis involving multiple joints 08/02/2017     CMC DJD(carpometacarpal degenerative joint disease), localized primary 08/02/2017     Lumbar spondylosis 11/06/2017     Smoking 01/08/2021     Diabetes mellitus, type 2 (H) 11/22/2021     PAD (peripheral artery disease) (H) 02/04/2022     Elevated hemoglobin (H) 02/04/2022     Resolved Ambulatory Problems     Diagnosis Date Noted     Rheumatoid arthritis (H) 06/06/2016     Past Medical History:   Diagnosis Date     Anxiety      Depression      Hyperlipidemia      Hypertension      Ingrown toenail      Onychomycosis      Painful swelling of joint      Sleep apnea        Family History   Problem Relation Age of Onset     Breast Cancer Mother 49.00     Hypertension Father      Dementia Father      No Known Problems Sister      No Known Problems Daughter      No Known Problems Maternal Grandmother      No Known Problems Maternal Grandfather      Diabetes Paternal Grandmother      No Known Problems Paternal Grandfather      No Known Problems Maternal Aunt      No Known Problems Paternal Aunt      Hereditary Breast and Ovarian Cancer Syndrome No family hx of      Colon Cancer No family hx of      Endometrial Cancer No family hx of      Ovarian Cancer No family hx of        Objective:     /83 (BP Location: Right arm, Patient Position: Sitting, Cuff Size: Adult Large)   Pulse 77   Temp 97.9  F (36.6  C) (Temporal)   Resp 20   Wt 103.2 kg (227 lb 9.6 oz)   SpO2 (!) 89%   BMI 39.07 kg/m      Patient is alert, in no obvious distress.   Skin: Warm, dry.    Lungs:  Clear to auscultation. Respirations even and unlabored.  No wheezing or rales noted.   Heart:  Regular rate and rhythm.  No murmurs.    Abdomen: Soft, nontender.  No organomegaly. Bowel sounds normoactive. No guarding or masses noted.   Musculoskeletal:  Monofilament testing is normal  bilaterally.            Answers for HPI/ROS submitted by the patient on 5/11/2023  If you checked off any problems, how difficult have these problems made it for you to do your work, take care of things at home, or get along with other people?: Not difficult at all  PHQ9 TOTAL SCORE: 1  Frequency of checking blood sugars:: not at all  Diabetic concerns:: other  Paraesthesia present:: none of these symptoms  How many servings of fruits and vegetables do you eat daily?: 2-3  On average, how many sweetened beverages do you drink each day (Examples: soda, juice, sweet tea, etc.  Do NOT count diet or artificially sweetened beverages)?: 1  How many minutes a day do you exercise enough to make your heart beat faster?: 9 or less  How many days a week do you exercise enough to make your heart beat faster?: 3 or less  How many days per week do you miss taking your medication?: 0

## 2023-05-11 NOTE — LETTER
May 13, 2023      Rochelle Garza  [address]  [place] [address]        Dear ,    We are writing to inform you of your test results.    Your urine screen was normal.  No evidence for significant protein in your urine.  We will plan to repeat this test on a yearly basis.    Your A1C is great! Keep up the good work!    Your liver enzyme (ALT or AST) is mildly elevated.  I encourage you to limit alcohol and tylenol use.      Your triglycerides are elevated.  This can be due to not completely fasting prior to the lab draw.  I recommend you consume a healthy low-fat diet (avoid fast food, fried foods, processed foods, and butter) and exercise.  Avoiding or limiting alcohol will assist with this.       Your HDL (good cholesterol) is low.  You can increase this by consuming a healthy diet (olive oil and purple produce) and exercising.     Your hemoglobin remains mildly elevated, but is stable.  This is likely due to smoking.  Please let me know when you are ready to quit.        Resulted Orders   Albumin Random Urine Quantitative with Creat Ratio   Result Value Ref Range    Creatinine Urine mg/dL 37.5 mg/dL      Comment:      The reference ranges have not been established in urine creatinine. The results should be integrated into the clinical context for interpretation.    Albumin Urine mg/L <12.0 mg/L      Comment:      The reference ranges have not been established in urine albumin. The results should be integrated into the clinical context for interpretation.    Albumin Urine mg/g Cr        Comment:      Unable to calculate, urine albumin and/or urine creatinine is outside detectable limits.  Microalbuminuria is defined as an albumin:creatinine ratio of 17 to 299 for males and 25 to 299 for females. A ratio of albumin:creatinine of 300 or higher is indicative of overt proteinuria.  Due to biologic variability, positive results should be confirmed by a second, first-morning random or 24-hour timed urine  specimen. If there is discrepancy, a third specimen is recommended. When 2 out of 3 results are in the microalbuminuria range, this is evidence for incipient nephropathy and warrants increased efforts at glucose control, blood pressure control, and institution of therapy with an angiotensin-converting-enzyme (ACE) inhibitor (if the patient can tolerate it).     HEMOGLOBIN A1C   Result Value Ref Range    Hemoglobin A1C 5.5 0.0 - 5.6 %      Comment:      Normal <5.7%   Prediabetes 5.7-6.4%    Diabetes 6.5% or higher     Note: Adopted from ADA consensus guidelines.   CBC with platelets   Result Value Ref Range    WBC Count 8.1 4.0 - 11.0 10e3/uL    RBC Count 5.19 3.80 - 5.20 10e6/uL    Hemoglobin 16.5 (H) 11.7 - 15.7 g/dL    Hematocrit 48.8 (H) 35.0 - 47.0 %    MCV 94 78 - 100 fL    MCH 31.8 26.5 - 33.0 pg    MCHC 33.8 31.5 - 36.5 g/dL    RDW 13.0 10.0 - 15.0 %    Platelet Count 230 150 - 450 10e3/uL   Lipid panel reflex to direct LDL Non-fasting   Result Value Ref Range    Cholesterol 146 <200 mg/dL    Triglycerides 196 (H) <150 mg/dL    Direct Measure HDL 41 (L) >=50 mg/dL    LDL Cholesterol Calculated 66 <=100 mg/dL    Non HDL Cholesterol 105 <130 mg/dL    Narrative    Cholesterol  Desirable:  <200 mg/dL    Triglycerides  Normal:  Less than 150 mg/dL  Borderline High:  150-199 mg/dL  High:  200-499 mg/dL  Very High:  Greater than or equal to 500 mg/dL    Direct Measure HDL  Female:  Greater than or equal to 50 mg/dL   Male:  Greater than or equal to 40 mg/dL    LDL Cholesterol  Desirable:  <100mg/dL  Above Desirable:  100-129 mg/dL   Borderline High:  130-159 mg/dL   High:  160-189 mg/dL   Very High:  >= 190 mg/dL    Non HDL Cholesterol  Desirable:  130 mg/dL  Above Desirable:  130-159 mg/dL  Borderline High:  160-189 mg/dL  High:  190-219 mg/dL  Very High:  Greater than or equal to 220 mg/dL   Comprehensive metabolic panel (BMP + Alb, Alk Phos, ALT, AST, Total. Bili, TP)   Result Value Ref Range    Sodium 139 136 -  145 mmol/L    Potassium 4.0 3.4 - 5.3 mmol/L    Chloride 98 98 - 107 mmol/L    Carbon Dioxide (CO2) 24 22 - 29 mmol/L    Anion Gap 17 (H) 7 - 15 mmol/L    Urea Nitrogen 11.1 8.0 - 23.0 mg/dL    Creatinine 0.80 0.51 - 0.95 mg/dL    Calcium 9.8 8.8 - 10.2 mg/dL    Glucose 93 70 - 99 mg/dL    Alkaline Phosphatase 78 35 - 104 U/L    AST 24 10 - 35 U/L    ALT 38 (H) 10 - 35 U/L    Protein Total 7.3 6.4 - 8.3 g/dL    Albumin 4.6 3.5 - 5.2 g/dL    Bilirubin Total 0.8 <=1.2 mg/dL    GFR Estimate 82 >60 mL/min/1.73m2      Comment:      eGFR calculated using 2021 CKD-EPI equation.       If you have any questions or concerns, please call the clinic at the number listed above.       Sincerely,      ERIC Fairchild CNP

## 2023-05-12 LAB
ALBUMIN SERPL BCG-MCNC: 4.6 G/DL (ref 3.5–5.2)
ALP SERPL-CCNC: 78 U/L (ref 35–104)
ALT SERPL W P-5'-P-CCNC: 38 U/L (ref 10–35)
ANION GAP SERPL CALCULATED.3IONS-SCNC: 17 MMOL/L (ref 7–15)
AST SERPL W P-5'-P-CCNC: 24 U/L (ref 10–35)
BILIRUB SERPL-MCNC: 0.8 MG/DL
BUN SERPL-MCNC: 11.1 MG/DL (ref 8–23)
CALCIUM SERPL-MCNC: 9.8 MG/DL (ref 8.8–10.2)
CHLORIDE SERPL-SCNC: 98 MMOL/L (ref 98–107)
CHOLEST SERPL-MCNC: 146 MG/DL
CREAT SERPL-MCNC: 0.8 MG/DL (ref 0.51–0.95)
DEPRECATED HCO3 PLAS-SCNC: 24 MMOL/L (ref 22–29)
GFR SERPL CREATININE-BSD FRML MDRD: 82 ML/MIN/1.73M2
GLUCOSE SERPL-MCNC: 93 MG/DL (ref 70–99)
HDLC SERPL-MCNC: 41 MG/DL
LDLC SERPL CALC-MCNC: 66 MG/DL
NONHDLC SERPL-MCNC: 105 MG/DL
POTASSIUM SERPL-SCNC: 4 MMOL/L (ref 3.4–5.3)
PROT SERPL-MCNC: 7.3 G/DL (ref 6.4–8.3)
SODIUM SERPL-SCNC: 139 MMOL/L (ref 136–145)
TRIGL SERPL-MCNC: 196 MG/DL

## 2023-05-23 ENCOUNTER — ANCILLARY PROCEDURE (OUTPATIENT)
Dept: MAMMOGRAPHY | Facility: CLINIC | Age: 63
End: 2023-05-23
Attending: NURSE PRACTITIONER
Payer: COMMERCIAL

## 2023-05-23 DIAGNOSIS — Z12.31 VISIT FOR SCREENING MAMMOGRAM: ICD-10-CM

## 2023-05-23 PROCEDURE — 77067 SCR MAMMO BI INCL CAD: CPT

## 2023-06-01 ENCOUNTER — TRANSFERRED RECORDS (OUTPATIENT)
Dept: HEALTH INFORMATION MANAGEMENT | Facility: CLINIC | Age: 63
End: 2023-06-01

## 2023-06-01 LAB — RETINOPATHY: NEGATIVE

## 2023-06-12 DIAGNOSIS — I10 BENIGN ESSENTIAL HYPERTENSION: ICD-10-CM

## 2023-06-12 RX ORDER — HYDROCHLOROTHIAZIDE 25 MG/1
TABLET ORAL
Qty: 90 TABLET | Refills: 3 | Status: SHIPPED | OUTPATIENT
Start: 2023-06-12 | End: 2024-04-04

## 2023-06-12 NOTE — TELEPHONE ENCOUNTER
"Last Written Prescription Date:  3/15/2023  Last Fill Quantity: 90,  # refills: 0   Last office visit provider:  5/11/2023     Requested Prescriptions   Pending Prescriptions Disp Refills     hydrochlorothiazide (HYDRODIURIL) 25 MG tablet [Pharmacy Med Name: HYDROCHLOROTHIAZIDE 25MG TABLETS] 90 tablet 0     Sig: TAKE 1 TABLET(25 MG) BY MOUTH DAILY       Diuretics (Including Combos) Protocol Passed - 6/12/2023  5:46 AM        Passed - Blood pressure under 140/90 in past 12 months     BP Readings from Last 3 Encounters:   05/11/23 129/83   11/08/22 122/78   04/27/22 114/78                 Passed - Recent (12 mo) or future (30 days) visit within the authorizing provider's specialty     Patient has had an office visit with the authorizing provider or a provider within the authorizing providers department within the previous 12 mos or has a future within next 30 days. See \"Patient Info\" tab in inbasket, or \"Choose Columns\" in Meds & Orders section of the refill encounter.              Passed - Medication is active on med list        Passed - Patient is age 18 or older        Passed - No active pregancy on record        Passed - Normal serum creatinine on file in past 12 months     Recent Labs   Lab Test 05/11/23  1400   CR 0.80              Passed - Normal serum potassium on file in past 12 months     Recent Labs   Lab Test 05/11/23  1400   POTASSIUM 4.0                    Passed - Normal serum sodium on file in past 12 months     Recent Labs   Lab Test 05/11/23  1400                 Passed - No positive pregnancy test in past 12 months             Evelyn Mercado RN 06/12/23 1:38 PM  "

## 2023-06-23 DIAGNOSIS — M47.816 LUMBAR SPONDYLOSIS: ICD-10-CM

## 2023-06-25 RX ORDER — IBUPROFEN 600 MG/1
TABLET, FILM COATED ORAL
Qty: 30 TABLET | Refills: 0 | Status: SHIPPED | OUTPATIENT
Start: 2023-06-25 | End: 2024-02-23

## 2023-06-25 NOTE — TELEPHONE ENCOUNTER
"Routing refill request to provider for review/approval because:  Labs out of range:  ALT, CBC    Last Written Prescription Date:  2/15/2023  Last Fill Quantity: 30,  # refills: 0   Last office visit provider:  5/11/2023     Requested Prescriptions   Pending Prescriptions Disp Refills     ibuprofen (ADVIL/MOTRIN) 600 MG tablet [Pharmacy Med Name: IBUPROFEN 600MG TABLETS] 30 tablet 0     Sig: TAKE 1 TABLET(600 MG) BY MOUTH EVERY 6 HOURS AS NEEDED FOR PAIN       NSAID Medications Failed - 6/23/2023  7:46 PM        Failed - Normal ALT on file in past 12 months     Recent Labs   Lab Test 05/11/23  1400   ALT 38*             Failed - Normal CBC on file in past 12 months     Recent Labs   Lab Test 05/11/23  1400   WBC 8.1   RBC 5.19   HGB 16.5*   HCT 48.8*                    Passed - Blood pressure under 140/90 in past 12 months     BP Readings from Last 3 Encounters:   05/11/23 129/83   11/08/22 122/78   04/27/22 114/78                 Passed - Normal AST on file in past 12 months     Recent Labs   Lab Test 05/11/23  1400   AST 24             Passed - Recent (12 mo) or future (30 days) visit within the authorizing provider's specialty     Patient has had an office visit with the authorizing provider or a provider within the authorizing providers department within the previous 12 mos or has a future within next 30 days. See \"Patient Info\" tab in inbasket, or \"Choose Columns\" in Meds & Orders section of the refill encounter.              Passed - Patient is age 6-64 years        Passed - Medication is active on med list        Passed - No active pregnancy on record        Passed - Normal serum creatinine on file in past 12 months     Recent Labs   Lab Test 05/11/23  1400   CR 0.80       Ok to refill medication if creatinine is low          Passed - No positive pregnancy test in past 12 months             Helen Miranda RN 06/24/23 7:06 PM  "

## 2023-07-20 ENCOUNTER — TELEPHONE (OUTPATIENT)
Dept: FAMILY MEDICINE | Facility: CLINIC | Age: 63
End: 2023-07-20
Payer: COMMERCIAL

## 2023-07-20 DIAGNOSIS — E11.9 TYPE 2 DIABETES MELLITUS WITHOUT COMPLICATION, WITHOUT LONG-TERM CURRENT USE OF INSULIN (H): ICD-10-CM

## 2023-07-20 NOTE — TELEPHONE ENCOUNTER
No notes in chart stating patient should discontinue Metformin. Routing to PCP to verify.    FARHAT GillN, RN  [facility]

## 2023-07-20 NOTE — TELEPHONE ENCOUNTER
Reason for call:  Other     Patient called regarding (reason for call): prescription    Additional comments: Patient is calling and asking if she is suppose to continue to take her Metformin or not. please advise and call patient back with updates please and thank you.     Phone number to reach patient:  Home number on file [phone] (home)    Best Time:  any    Can we leave a detailed message on this number?  YES

## 2023-09-10 DIAGNOSIS — F33.0 MILD EPISODE OF RECURRENT MAJOR DEPRESSIVE DISORDER (H): ICD-10-CM

## 2023-09-10 RX ORDER — FLUOXETINE 10 MG/1
10 CAPSULE ORAL DAILY
Qty: 90 CAPSULE | Refills: 0 | Status: SHIPPED | OUTPATIENT
Start: 2023-09-10 | End: 2023-12-11

## 2023-09-10 NOTE — TELEPHONE ENCOUNTER
"Last Written Prescription Date:  9/14/2022  Last Fill Quantity: 90,  # refills: 3   Last office visit provider:  5/11/2023     Requested Prescriptions   Pending Prescriptions Disp Refills    FLUoxetine (PROZAC) 10 MG capsule [Pharmacy Med Name: FLUOXETINE 10MG CAPSULES] 90 capsule 3     Sig: TAKE 1 CAPSULE(10 MG) BY MOUTH DAILY       SSRIs Protocol Passed - 9/10/2023  5:43 AM        Passed - PHQ-9 score less than 5 in past 6 months     Please review last PHQ-9 score.           Passed - Medication is active on med list        Passed - Patient is age 18 or older        Passed - No active pregnancy on record        Passed - No positive pregnancy test in last 12 months        Passed - Recent (6 mo) or future (30 days) visit within the authorizing provider's specialty     Patient had office visit in the last 6 months or has a visit in the next 30 days with authorizing provider or within the authorizing provider's specialty.  See \"Patient Info\" tab in inbasket, or \"Choose Columns\" in Meds & Orders section of the refill encounter.                 Jacy Rebolledo RN 09/10/23 7:54 AM  "

## 2023-09-27 ENCOUNTER — MEDICAL CORRESPONDENCE (OUTPATIENT)
Dept: HEALTH INFORMATION MANAGEMENT | Facility: CLINIC | Age: 63
End: 2023-09-27
Payer: COMMERCIAL

## 2023-10-05 ENCOUNTER — TRANSFERRED RECORDS (OUTPATIENT)
Dept: HEALTH INFORMATION MANAGEMENT | Facility: CLINIC | Age: 63
End: 2023-10-05

## 2023-10-05 LAB — RETINOPATHY: NEGATIVE

## 2023-11-14 ENCOUNTER — TRANSFERRED RECORDS (OUTPATIENT)
Dept: HEALTH INFORMATION MANAGEMENT | Facility: CLINIC | Age: 63
End: 2023-11-14

## 2023-11-14 LAB — RETINOPATHY: NEGATIVE

## 2023-11-28 ENCOUNTER — TRANSFERRED RECORDS (OUTPATIENT)
Dept: HEALTH INFORMATION MANAGEMENT | Facility: CLINIC | Age: 63
End: 2023-11-28

## 2023-11-28 LAB — RETINOPATHY: NEGATIVE

## 2023-12-09 DIAGNOSIS — F33.0 MILD EPISODE OF RECURRENT MAJOR DEPRESSIVE DISORDER (H): ICD-10-CM

## 2023-12-11 RX ORDER — FLUOXETINE 10 MG/1
10 CAPSULE ORAL DAILY
Qty: 90 CAPSULE | Refills: 0 | Status: SHIPPED | OUTPATIENT
Start: 2023-12-11 | End: 2024-03-05

## 2024-01-04 ENCOUNTER — TRANSFERRED RECORDS (OUTPATIENT)
Dept: HEALTH INFORMATION MANAGEMENT | Facility: CLINIC | Age: 64
End: 2024-01-04

## 2024-01-11 ENCOUNTER — TRANSFERRED RECORDS (OUTPATIENT)
Dept: MULTI SPECIALTY CLINIC | Facility: CLINIC | Age: 64
End: 2024-01-11

## 2024-01-11 LAB — RETINOPATHY: NORMAL

## 2024-01-24 DIAGNOSIS — E11.9 TYPE 2 DIABETES MELLITUS WITHOUT COMPLICATION, WITHOUT LONG-TERM CURRENT USE OF INSULIN (H): ICD-10-CM

## 2024-02-22 DIAGNOSIS — E11.9 TYPE 2 DIABETES MELLITUS WITHOUT COMPLICATION, WITHOUT LONG-TERM CURRENT USE OF INSULIN (H): ICD-10-CM

## 2024-02-22 DIAGNOSIS — M47.816 LUMBAR SPONDYLOSIS: ICD-10-CM

## 2024-02-23 RX ORDER — IBUPROFEN 600 MG/1
TABLET, FILM COATED ORAL
Qty: 30 TABLET | Refills: 0 | Status: SHIPPED | OUTPATIENT
Start: 2024-02-23 | End: 2024-03-05

## 2024-03-04 ENCOUNTER — TRANSFERRED RECORDS (OUTPATIENT)
Dept: HEALTH INFORMATION MANAGEMENT | Facility: CLINIC | Age: 64
End: 2024-03-04
Payer: COMMERCIAL

## 2024-03-05 DIAGNOSIS — M47.816 LUMBAR SPONDYLOSIS: ICD-10-CM

## 2024-03-05 DIAGNOSIS — F33.0 MILD EPISODE OF RECURRENT MAJOR DEPRESSIVE DISORDER (H): ICD-10-CM

## 2024-03-05 DIAGNOSIS — E11.9 TYPE 2 DIABETES MELLITUS WITHOUT COMPLICATION, WITHOUT LONG-TERM CURRENT USE OF INSULIN (H): ICD-10-CM

## 2024-03-05 DIAGNOSIS — E78.2 MIXED HYPERLIPIDEMIA: ICD-10-CM

## 2024-03-05 RX ORDER — IBUPROFEN 600 MG/1
TABLET, FILM COATED ORAL
Qty: 30 TABLET | Refills: 0 | Status: SHIPPED | OUTPATIENT
Start: 2024-03-05

## 2024-03-05 RX ORDER — ATORVASTATIN CALCIUM 40 MG/1
TABLET, FILM COATED ORAL
Qty: 90 TABLET | Refills: 0 | Status: SHIPPED | OUTPATIENT
Start: 2024-03-05 | End: 2024-04-04

## 2024-03-05 RX ORDER — FLUOXETINE 10 MG/1
10 CAPSULE ORAL DAILY
Qty: 90 CAPSULE | Refills: 0 | Status: SHIPPED | OUTPATIENT
Start: 2024-03-05 | End: 2024-04-04

## 2024-03-05 NOTE — TELEPHONE ENCOUNTER
Medication Question or Refill    Contacts         Type Contact Phone/Fax    03/05/2024 09:30 AM CST Fax (Incoming) [facility] - [address] (Pharmacy) [phone]            What medication are you calling about (include dose and sig)?: atorvastatin (LIPITOR) 40 MG tablet   metFORMIN (GLUCOPHAGE) 500 MG tablet   ibuprofen (ADVIL/MOTRIN) 600 MG tablet   FLUoxetine (PROZAC) 10 MG capsule    Preferred Pharmacy:    [facility] - [place] [id number] [address]  Phone: [phone] Fax: [phone]      Controlled Substance Agreement on file:   CSA -- Patient Level:    CSA: None found at the patient level.       Who prescribed the medication?: Dolan    Do you need a refill? Yes    When did you use the medication last? N/A    Patient offered an appointment? No    Do you have any questions or concerns?  Yes: Metformin and Ibuprofen was already refilled but needs to be sent to opt.      Okay to leave a detailed message?: Yes at Home number on file [phone] (home)

## 2024-03-26 DIAGNOSIS — E11.9 TYPE 2 DIABETES MELLITUS WITHOUT COMPLICATION, WITHOUT LONG-TERM CURRENT USE OF INSULIN (H): ICD-10-CM

## 2024-04-04 ENCOUNTER — ORDERS ONLY (AUTO-RELEASED) (OUTPATIENT)
Dept: FAMILY MEDICINE | Facility: CLINIC | Age: 64
End: 2024-04-04

## 2024-04-04 ENCOUNTER — OFFICE VISIT (OUTPATIENT)
Dept: FAMILY MEDICINE | Facility: CLINIC | Age: 64
End: 2024-04-04
Payer: COMMERCIAL

## 2024-04-04 VITALS
SYSTOLIC BLOOD PRESSURE: 115 MMHG | WEIGHT: 231 LBS | RESPIRATION RATE: 14 BRPM | DIASTOLIC BLOOD PRESSURE: 79 MMHG | BODY MASS INDEX: 39.44 KG/M2 | HEIGHT: 64 IN | TEMPERATURE: 97.7 F | OXYGEN SATURATION: 97 % | HEART RATE: 80 BPM

## 2024-04-04 DIAGNOSIS — I73.9 PAD (PERIPHERAL ARTERY DISEASE) (H): ICD-10-CM

## 2024-04-04 DIAGNOSIS — E78.2 MIXED HYPERLIPIDEMIA: ICD-10-CM

## 2024-04-04 DIAGNOSIS — Z79.899 MEDICATION MANAGEMENT: ICD-10-CM

## 2024-04-04 DIAGNOSIS — Z12.11 COLON CANCER SCREENING: ICD-10-CM

## 2024-04-04 DIAGNOSIS — E66.01 MORBID OBESITY (H): ICD-10-CM

## 2024-04-04 DIAGNOSIS — F33.0 MILD EPISODE OF RECURRENT MAJOR DEPRESSIVE DISORDER (H): ICD-10-CM

## 2024-04-04 DIAGNOSIS — E11.9 TYPE 2 DIABETES MELLITUS WITHOUT COMPLICATION, WITHOUT LONG-TERM CURRENT USE OF INSULIN (H): ICD-10-CM

## 2024-04-04 DIAGNOSIS — D58.2 ELEVATED HEMOGLOBIN (H): ICD-10-CM

## 2024-04-04 DIAGNOSIS — Z00.00 ENCOUNTER FOR MEDICARE ANNUAL WELLNESS EXAM: Primary | ICD-10-CM

## 2024-04-04 DIAGNOSIS — I10 BENIGN ESSENTIAL HYPERTENSION: ICD-10-CM

## 2024-04-04 LAB
ALBUMIN UR-MCNC: NEGATIVE MG/DL
APPEARANCE UR: CLEAR
BACTERIA #/AREA URNS HPF: ABNORMAL /HPF
BILIRUB UR QL STRIP: NEGATIVE
COLOR UR AUTO: YELLOW
ERYTHROCYTE [DISTWIDTH] IN BLOOD BY AUTOMATED COUNT: 12.9 % (ref 10–15)
GLUCOSE UR STRIP-MCNC: NEGATIVE MG/DL
HBA1C MFR BLD: 5.7 % (ref 0–5.6)
HCT VFR BLD AUTO: 48 % (ref 35–47)
HGB BLD-MCNC: 15.7 G/DL (ref 11.7–15.7)
HGB UR QL STRIP: ABNORMAL
KETONES UR STRIP-MCNC: NEGATIVE MG/DL
LEUKOCYTE ESTERASE UR QL STRIP: NEGATIVE
MCH RBC QN AUTO: 31 PG (ref 26.5–33)
MCHC RBC AUTO-ENTMCNC: 32.7 G/DL (ref 31.5–36.5)
MCV RBC AUTO: 95 FL (ref 78–100)
NITRATE UR QL: NEGATIVE
PH UR STRIP: 5.5 [PH] (ref 5–8)
PLATELET # BLD AUTO: 219 10E3/UL (ref 150–450)
RBC # BLD AUTO: 5.06 10E6/UL (ref 3.8–5.2)
RBC #/AREA URNS AUTO: ABNORMAL /HPF
SP GR UR STRIP: >=1.03 (ref 1–1.03)
SQUAMOUS #/AREA URNS AUTO: ABNORMAL /LPF
UROBILINOGEN UR STRIP-ACNC: 0.2 E.U./DL
WBC # BLD AUTO: 6.3 10E3/UL (ref 4–11)
WBC #/AREA URNS AUTO: ABNORMAL /HPF

## 2024-04-04 PROCEDURE — 91320 SARSCV2 VAC 30MCG TRS-SUC IM: CPT | Performed by: NURSE PRACTITIONER

## 2024-04-04 PROCEDURE — 85027 COMPLETE CBC AUTOMATED: CPT | Performed by: NURSE PRACTITIONER

## 2024-04-04 PROCEDURE — 83036 HEMOGLOBIN GLYCOSYLATED A1C: CPT | Performed by: NURSE PRACTITIONER

## 2024-04-04 PROCEDURE — 84443 ASSAY THYROID STIM HORMONE: CPT | Performed by: NURSE PRACTITIONER

## 2024-04-04 PROCEDURE — 81001 URINALYSIS AUTO W/SCOPE: CPT | Performed by: NURSE PRACTITIONER

## 2024-04-04 PROCEDURE — 90480 ADMN SARSCOV2 VAC 1/ONLY CMP: CPT | Performed by: NURSE PRACTITIONER

## 2024-04-04 PROCEDURE — 99214 OFFICE O/P EST MOD 30 MIN: CPT | Mod: 25 | Performed by: NURSE PRACTITIONER

## 2024-04-04 PROCEDURE — 80053 COMPREHEN METABOLIC PANEL: CPT | Performed by: NURSE PRACTITIONER

## 2024-04-04 PROCEDURE — 36415 COLL VENOUS BLD VENIPUNCTURE: CPT | Performed by: NURSE PRACTITIONER

## 2024-04-04 PROCEDURE — G0439 PPPS, SUBSEQ VISIT: HCPCS | Performed by: NURSE PRACTITIONER

## 2024-04-04 PROCEDURE — 80061 LIPID PANEL: CPT | Performed by: NURSE PRACTITIONER

## 2024-04-04 RX ORDER — HYDROCHLOROTHIAZIDE 25 MG/1
25 TABLET ORAL DAILY
Qty: 90 TABLET | Refills: 3 | Status: SHIPPED | OUTPATIENT
Start: 2024-04-04

## 2024-04-04 RX ORDER — FLUOXETINE 10 MG/1
10 CAPSULE ORAL DAILY
Qty: 90 CAPSULE | Refills: 3 | Status: SHIPPED | OUTPATIENT
Start: 2024-04-04

## 2024-04-04 RX ORDER — ATORVASTATIN CALCIUM 40 MG/1
TABLET, FILM COATED ORAL
Qty: 90 TABLET | Refills: 3 | Status: SHIPPED | OUTPATIENT
Start: 2024-04-04

## 2024-04-04 SDOH — HEALTH STABILITY: PHYSICAL HEALTH: ON AVERAGE, HOW MANY DAYS PER WEEK DO YOU ENGAGE IN MODERATE TO STRENUOUS EXERCISE (LIKE A BRISK WALK)?: 2 DAYS

## 2024-04-04 ASSESSMENT — SOCIAL DETERMINANTS OF HEALTH (SDOH): HOW OFTEN DO YOU GET TOGETHER WITH FRIENDS OR RELATIVES?: THREE TIMES A WEEK

## 2024-04-04 ASSESSMENT — ACTIVITIES OF DAILY LIVING (ADL): CURRENT_FUNCTION: NO ASSISTANCE NEEDED

## 2024-04-04 ASSESSMENT — PAIN SCALES - GENERAL: PAINLEVEL: MILD PAIN (3)

## 2024-04-04 NOTE — PROGRESS NOTES
Preventive Care Visit  [facility]  ERIC Fairchild CNP, Family Medicine  Apr 4, 2024      Encounter for Medicare annual wellness exam  Recommend consuming a healthy diet and exercising.  She declines HIV and hepatitis C screening.  She will consider obtaining the RSV vaccine at the pharmacy.  She declines lung cancer screening.  Cologuard ordered.  Discussed smoking cessation options, but she declines.  - CBC   - TSH with free T4 reflex  - UA Macroscopic with reflex to Microscopic and Culture    Colon cancer screening  - COLOGUARD([employer])    Mixed hyperlipidemia  Goal LDL is less than 70.  She continues a atorvastatin.  - Lipid panel reflex to direct LDL Fasting  - atorvastatin (LIPITOR) 40 MG tablet  Dispense: 90 tablet; Refill: 3    Type 2 diabetes mellitus without complication, without long-term current use of insulin (H)  Will check A1c.  She continues metformin.  - Hemoglobin A1c  - metFORMIN (GLUCOPHAGE) 500 MG tablet  Dispense: 180 tablet; Refill: 1    Mild episode of recurrent major depressive disorder (H24)  This is controlled with fluoxetine.  - FLUoxetine (PROZAC) 10 MG capsule  Dispense: 90 capsule; Refill: 3    PAD (peripheral artery disease) (H24)  She continues a atorvastatin.  Recommend daily baby aspirin.    Elevated hemoglobin (H24)  Patient has had a workup showing no polycythemia.  Suspect elevated hemoglobin is due to smoking.    Benign essential hypertension  This is controlled with hydrochlorothiazide.  - hydrochlorothiazide (HYDRODIURIL) 25 MG tablet  Dispense: 90 tablet; Refill: 3    Morbid obesity (H)  Recommend consuming a healthy diet and exercising.  This is contributing to hypertension, hyperlipidemia, diabetes.    Medication management  - Comprehensive metabolic panel        Ammy Byrd is a 63 year old, presenting for the following:  Physical        4/4/2024     8:12 AM   Additional Questions   Roomed by Shasha           Via the Health  Maintenance questionnaire, the patient has reported the following services have been completed , this information has been sent to the abstraction team.  Health Care Directive  Patient does not have a Health Care Directive or Living Will: Discussed advance care planning with patient; information given to patient to review.    Healthy Habits:     In general, how would you rate your overall health?  Very good    Frequency of exercise:  None    Duration of exercise:  Less than 15 minutes    Taking medications regularly:  Yes    Barriers to taking medications:  None    Medication side effects:  Not applicable    Ability to successfully perform activities of daily living:  No assistance needed    Home Safety:  No safety concerns identified    Hearing Impairment:  No hearing concerns    In the past 6 months, have you been bothered by leaking of urine?  No    In general, how would you rate your overall mental or emotional health?  Very good    Additional concerns today:  No      Patient is single and not sexually active.  She has not been sexually active in the past.  She has no concerns for STDs.  She declines Pap smear today. Patient has multiple comorbidities including type 2 diabetes, hypertension, hyperlipidemia, PAD.  She has a history of an elevated hemoglobin and saw oncology.  No polycythemia vera or myeloproliferative disease noted.  Patient is taking metformin 500 mg twice daily.  She is trying consume a healthy diet.  She is not currently exercising.  She just does not check her blood sugars.  She denies any sores on her feet.  Her last eye exam was 3 months ago.  She is taking hydrochlorothiazide 25 mg daily for blood pressure management.  Depression and anxiety symptoms are controlled with fluoxetine 10 mg daily.  She is taking atorvastatin 40 mg daily for lipid management.  She continues to smoke 9 to 10 cigarettes/day and is not ready to quit.         4/4/2024   General Health   How would you rate your  overall physical health? Good   Feel stress (tense, anxious, or unable to sleep) Not at all         4/4/2024   Nutrition   Diet: Diabetic         4/4/2024   Exercise   Days per week of moderate/strenous exercise 2 days   (!) EXERCISE CONCERN      4/4/2024   Social Factors   Frequency of gathering with friends or relatives Three times a week   Worry food won't last until get money to buy more No   Food not last or not have enough money for food? No   Do you have housing?  Yes   Are you worried about losing your housing? No   Lack of transportation? No   Unable to get utilities (heat,electricity)? No         4/22/2024   Fall Risk   Fallen 2 or more times in the past year? No   Trouble with walking or balance? No          4/4/2024   Activities of Daily Living- Home Safety   Needs help with the following daily activites None of the above   Safety concerns in the home None of the above         4/4/2024   Dental   Dentist two times every year? Yes         4/4/2024   Hearing Screening   Hearing concerns? (!) I NEED TO ASK PEOPLE TO SPEAK UP OR REPEAT THEMSELVES.         4/4/2024   Driving Risk Screening   Patient/family members have concerns about driving No         4/4/2024   General Alertness/Fatigue Screening   Have you been more tired than usual lately? No         4/4/2024   Urinary Incontinence Screening   Bothered by leaking urine in past 6 months Yes         4/4/2024   TB Screening   Were you born outside of the [place]? No       Today's PHQ-9 Score:       4/4/2024     7:59 AM   PHQ-9 SCORE   PHQ-9 Total Score MyChart 0   PHQ-9 Total Score 0         4/4/2024   Substance Use   Alcohol more than 3/day or more than 7/wk No   Do you have a current opioid prescription? No   How severe/bad is pain from 1 to 10? 1/10   Do you use any other substances recreationally? No     Social History     Tobacco Use    Smoking status: Every Day     Current packs/day: 0.50     Average packs/day: 0.5 packs/day for 15.0 years (7.5 ttl pk-yrs)      Types: Cigarettes     Passive exposure: Current    Smokeless tobacco: Never   Vaping Use    Vaping status: Never Used   Substance Use Topics    Alcohol use: No    Drug use: No           5/23/2023   LAST FHS-7 RESULTS   1st degree relative breast or ovarian cancer Yes   Any relative bilateral breast cancer No   Any male have breast cancer No   Any ONE woman have BOTH breast AND ovarian cancer No   Any woman with breast cancer before 50yrs Yes   2 or more relatives with breast AND/OR ovarian cancer No   2 or more relatives with breast AND/OR bowel cancer No        Mammogram Screening - Mammogram every 1-2 years updated in Health Maintenance based on mutual decision making          4/4/2024   One time HIV Screening   Previous HIV test? No     History of abnormal Pap smear: No abnormal pap smears; she declines pap today         6/6/2016     8:45 AM   PAP / HPV   PAP Negative for squamous intraepithelial lesion or malignancy  Electronically signed by Megha Garcia CT (ASCP) on 6/14/2016 at  1:30 PM        ASCVD Risk   The 10-year ASCVD risk score (Kenisha DIAZ, et al., 2019) is: 19.9%    Values used to calculate the score:      Age: 63 years      Sex: Female      Is Non- : No      Diabetic: Yes      Tobacco smoker: Yes      Systolic Blood Pressure: 115 mmHg      Is BP treated: Yes      HDL Cholesterol: 33 mg/dL      Total Cholesterol: 148 mg/dL            Reviewed and updated as needed this visit by Provider       Med Hx  Surg Hx  Fam Hx            Past Medical History:   Diagnosis Date    Anxiety     Depression     Hyperlipidemia     Hypertension     Ingrown toenail     Onychomycosis     Painful swelling of joint     Rheumatoid arthritis (H)     Sleep apnea     Using CPAP machine     Past Surgical History:   Procedure Laterality Date    ANKLE SURGERY Right     HERNIA REPAIR      LUMBAR FUSION  2006    Done at [facility]    SPINAL FUSION      [facility] APPENDECTOMY      Description:  Appendectomy;  Recorded: 09/23/2008;     Current Outpatient Medications   Medication Sig Dispense Refill    atorvastatin (LIPITOR) 40 MG tablet [ATORVASTATIN (LIPITOR) 40 MG TABLET] TAKE 1 TABLET(40 MG) BY MOUTH DAILY 90 tablet 3    FLUoxetine (PROZAC) 10 MG capsule Take 1 capsule (10 mg) by mouth daily 90 capsule 3    fluticasone (FLONASE) 50 mcg/actuation nasal spray [FLUTICASONE (FLONASE) 50 MCG/ACTUATION NASAL SPRAY] SHAKE WELL AND USE 2 SPRAYS IN EACH NOSTRIL DAILY FOR 10 DAYS 48 g 3    hydrochlorothiazide (HYDRODIURIL) 25 MG tablet Take 1 tablet (25 mg) by mouth daily 90 tablet 3    ibuprofen (ADVIL/MOTRIN) 600 MG tablet TAKE 1 TABLET(600 MG) BY MOUTH EVERY 6 HOURS AS NEEDED FOR PAIN 30 tablet 0    metFORMIN (GLUCOPHAGE) 500 MG tablet TAKE 1 TABLET BY MOUTH TWICE  DAILY WITH MEALS 180 tablet 1     Current providers sharing in care for this patient include:  Patient Care Team:  Leonora Dolan APRN CNP as PCP - General  Precious Haro PharmD as Pharmacist (Pharmacist)  Leonora Dolan APRN CNP as Assigned PCP  Shane Bonilla MD (Internal Medicine)  James Mejia, RN as Specialty Care Coordinator (Hematology & Oncology)    The following health maintenance items are reviewed in Epic and correct as of today:  Health Maintenance   Topic Date Due    ADVANCE CARE PLANNING  Never done    DEPRESSION ACTION PLAN  Never done    RSV VACCINE (Pregnancy & 60+) (1 - 1-dose 60+ series) Never done    NICOTINE/TOBACCO CESSATION COUNSELING Q 1 YR  01/14/2021    HPV TEST  06/06/2021    PAP  06/06/2021    LUNG CANCER SCREENING  12/05/2022    COLORECTAL CANCER SCREENING  08/08/2023    MICROALBUMIN  05/11/2024    DIABETIC FOOT EXAM  05/11/2024    ANNUAL REVIEW OF HM ORDERS  05/11/2024    HEPATITIS C SCREENING  04/04/2025 (Originally 7/20/1978)    HIV SCREENING  04/04/2025 (Originally [birth year]20/1975)    A1C  07/04/2024    Pneumococcal Vaccine: Pediatrics (0 to 5 Years) and At-Risk Patients (6 to 64 Years) (3 of 3 - PPSV23 or  "PCV20) 07/20/2024    PHQ-9  10/04/2024    MEDICARE ANNUAL WELLNESS VISIT  04/04/2025    BMP  04/04/2025    LIPID  04/04/2025    EYE EXAM  04/10/2025    MAMMO SCREENING  05/23/2025    DTAP/TDAP/TD IMMUNIZATION (3 - Td or Tdap) 06/06/2026    INFLUENZA VACCINE  Completed    ZOSTER IMMUNIZATION  Completed    COVID-19 Vaccine  Completed    IPV IMMUNIZATION  Aged Out    HPV IMMUNIZATION  Aged Out    MENINGITIS IMMUNIZATION  Aged Out    RSV MONOCLONAL ANTIBODY  Aged Out         Review of Systems  Constitutional, HEENT, cardiovascular, pulmonary, GI, , musculoskeletal, neuro, skin, endocrine and psych systems are negative, except as otherwise noted.     Objective    Exam  /79   Pulse 80   Temp 97.7  F (36.5  C)   Resp 14   Ht 1.626 m (5' 4\")   Wt 104.8 kg (231 lb)   SpO2 97%   Breastfeeding No   BMI 39.65 kg/m     Estimated body mass index is 39.65 kg/m  as calculated from the following:    Height as of this encounter: 1.626 m (5' 4\").    Weight as of this encounter: 104.8 kg (231 lb).    Physical Exam  GENERAL: alert and no distress  EYES: Eyes grossly normal to inspection, PERRL and conjunctivae and sclerae normal  HENT: ear canals and TM's normal, nose and mouth without ulcers or lesions  NECK: no adenopathy, no asymmetry, masses, or scars  RESP: lungs clear to auscultation - no rales, rhonchi or wheezes  CV: regular rate and rhythm, normal S1 S2, no S3 or S4, no murmur, click or rub, no peripheral edema  ABDOMEN: soft, nontender, no hepatosplenomegaly, no masses and bowel sounds normal  MS: no gross musculoskeletal defects noted, no edema  SKIN: no suspicious lesions or rashes  NEURO: Normal strength and tone, mentation intact and speech normal  PSYCH: mentation appears normal, affect normal/bright        4/4/2024   Mini Cog   Mini-Cog Not Completed (choose reason) Patient declines       Patient declines, there are NO concerns for cognitive deficits.           Signed Electronically by: Leonora Dolan, " ERIC CNP    Answers submitted by the patient for this visit:  Patient Health Questionnaire (Submitted on 4/4/2024)  If you checked off any problems, how difficult have these problems made it for you to do your work, take care of things at home, or get along with other people?: Not difficult at all  PHQ9 TOTAL SCORE: 0

## 2024-04-04 NOTE — LETTER
April 8, 2024      Rochelle FATIMAH Garza  [address]  [place] [address]        Dear ,    We are writing to inform you of your test results.    Your complete blood count results were normal.  No evidence for anemia, etc.     Continue your current diabetes management as discussed in order to further improve.  Goal A1c remains < 6.5% ideally.     Your kidney and liver tests were otherwise normal.    Your cholesterol results remain mildly elevated.  Continue your current medication as discussed. Ensure regular exercise, healthy diet, and weight loss modifications in order to further improve.  We will plan to recheck your labs while fasting in the next 6-12 months to ensure desired improvement.      Your urine screen was fine.      Resulted Orders   CBC with platelets   Result Value Ref Range    WBC Count 6.3 4.0 - 11.0 10e3/uL    RBC Count 5.06 3.80 - 5.20 10e6/uL    Hemoglobin 15.7 11.7 - 15.7 g/dL    Hematocrit 48.0 (H) 35.0 - 47.0 %    MCV 95 78 - 100 fL    MCH 31.0 26.5 - 33.0 pg    MCHC 32.7 31.5 - 36.5 g/dL    RDW 12.9 10.0 - 15.0 %    Platelet Count 219 150 - 450 10e3/uL   Comprehensive metabolic panel   Result Value Ref Range    Sodium 139 135 - 145 mmol/L      Comment:      Reference intervals for this test were updated on 09/26/2023 to more accurately reflect our healthy population. There may be differences in the flagging of prior results with similar values performed with this method. Interpretation of those prior results can be made in the context of the updated reference intervals.     Potassium 3.9 3.4 - 5.3 mmol/L    Carbon Dioxide (CO2) 26 22 - 29 mmol/L    Anion Gap 12 7 - 15 mmol/L    Urea Nitrogen 15.0 8.0 - 23.0 mg/dL    Creatinine 0.84 0.51 - 0.95 mg/dL    GFR Estimate 77 >60 mL/min/1.73m2    Calcium 9.3 8.8 - 10.2 mg/dL    Chloride 101 98 - 107 mmol/L    Glucose 129 (H) 70 - 99 mg/dL    Alkaline Phosphatase 78 40 - 150 U/L      Comment:      Reference intervals for this test were  updated on 11/14/2023 to more accurately reflect our healthy population. There may be differences in the flagging of prior results with similar values performed with this method. Interpretation of those prior results can be made in the context of the updated reference intervals.    AST 18 0 - 45 U/L      Comment:      Reference intervals for this test were updated on 6/12/2023 to more accurately reflect our healthy population. There may be differences in the flagging of prior results with similar values performed with this method. Interpretation of those prior results can be made in the context of the updated reference intervals.    ALT 26 0 - 50 U/L      Comment:      Reference intervals for this test were updated on 6/12/2023 to more accurately reflect our healthy population. There may be differences in the flagging of prior results with similar values performed with this method. Interpretation of those prior results can be made in the context of the updated reference intervals.      Protein Total 6.9 6.4 - 8.3 g/dL    Albumin 4.4 3.5 - 5.2 g/dL    Bilirubin Total 0.3 <=1.2 mg/dL   Lipid panel reflex to direct LDL Fasting   Result Value Ref Range    Cholesterol 148 <200 mg/dL    Triglycerides 336 (H) <150 mg/dL    Direct Measure HDL 33 (L) >=50 mg/dL    LDL Cholesterol Calculated 48 <=100 mg/dL    Non HDL Cholesterol 115 <130 mg/dL    Patient Fasting > 8hrs? Yes     Narrative    Cholesterol  Desirable:  <200 mg/dL    Triglycerides  Normal:  Less than 150 mg/dL  Borderline High:  150-199 mg/dL  High:  200-499 mg/dL  Very High:  Greater than or equal to 500 mg/dL    Direct Measure HDL  Female:  Greater than or equal to 50 mg/dL   Male:  Greater than or equal to 40 mg/dL    LDL Cholesterol  Desirable:  <100mg/dL  Above Desirable:  100-129 mg/dL   Borderline High:  130-159 mg/dL   High:  160-189 mg/dL   Very High:  >= 190 mg/dL    Non HDL Cholesterol  Desirable:  130 mg/dL  Above Desirable:  130-159 mg/dL  Borderline  High:  160-189 mg/dL  High:  190-219 mg/dL  Very High:  Greater than or equal to 220 mg/dL   TSH with free T4 reflex   Result Value Ref Range    TSH 1.35 0.30 - 4.20 uIU/mL   Hemoglobin A1c   Result Value Ref Range    Hemoglobin A1C 5.7 (H) 0.0 - 5.6 %      Comment:      Normal <5.7%   Prediabetes 5.7-6.4%    Diabetes 6.5% or higher     Note: Adopted from ADA consensus guidelines.   UA Macroscopic with reflex to Microscopic and Culture   Result Value Ref Range    Color Urine Yellow Colorless, Straw, Light Yellow, Yellow    Appearance Urine Clear Clear    Glucose Urine Negative Negative mg/dL    Bilirubin Urine Negative Negative    Ketones Urine Negative Negative mg/dL    Specific Gravity Urine >=1.030 1.005 - 1.030    Blood Urine Small (A) Negative    pH Urine 5.5 5.0 - 8.0    Protein Albumin Urine Negative Negative mg/dL    Urobilinogen Urine 0.2 0.2, 1.0 E.U./dL    Nitrite Urine Negative Negative    Leukocyte Esterase Urine Negative Negative   UA Microscopic with Reflex to Culture   Result Value Ref Range    Bacteria Urine Moderate (A) None Seen /HPF    RBC Urine 0-2 0-2 /HPF /HPF    WBC Urine 0-5 0-5 /HPF /HPF    Squamous Epithelials Urine Many (A) None Seen /LPF    Narrative    Urine Culture not indicated       If you have any questions or concerns, please call the clinic at the number listed above.       Sincerely,      ERIC Fairchild CNP

## 2024-04-05 LAB
ALBUMIN SERPL BCG-MCNC: 4.4 G/DL (ref 3.5–5.2)
ALP SERPL-CCNC: 78 U/L (ref 40–150)
ALT SERPL W P-5'-P-CCNC: 26 U/L (ref 0–50)
ANION GAP SERPL CALCULATED.3IONS-SCNC: 12 MMOL/L (ref 7–15)
AST SERPL W P-5'-P-CCNC: 18 U/L (ref 0–45)
BILIRUB SERPL-MCNC: 0.3 MG/DL
BUN SERPL-MCNC: 15 MG/DL (ref 8–23)
CALCIUM SERPL-MCNC: 9.3 MG/DL (ref 8.8–10.2)
CHLORIDE SERPL-SCNC: 101 MMOL/L (ref 98–107)
CHOLEST SERPL-MCNC: 148 MG/DL
CREAT SERPL-MCNC: 0.84 MG/DL (ref 0.51–0.95)
DEPRECATED HCO3 PLAS-SCNC: 26 MMOL/L (ref 22–29)
EGFRCR SERPLBLD CKD-EPI 2021: 77 ML/MIN/1.73M2
FASTING STATUS PATIENT QL REPORTED: YES
GLUCOSE SERPL-MCNC: 129 MG/DL (ref 70–99)
HDLC SERPL-MCNC: 33 MG/DL
LDLC SERPL CALC-MCNC: 48 MG/DL
NONHDLC SERPL-MCNC: 115 MG/DL
POTASSIUM SERPL-SCNC: 3.9 MMOL/L (ref 3.4–5.3)
PROT SERPL-MCNC: 6.9 G/DL (ref 6.4–8.3)
SODIUM SERPL-SCNC: 139 MMOL/L (ref 135–145)
TRIGL SERPL-MCNC: 336 MG/DL
TSH SERPL DL<=0.005 MIU/L-ACNC: 1.35 UIU/ML (ref 0.3–4.2)

## 2024-04-10 ENCOUNTER — TRANSFERRED RECORDS (OUTPATIENT)
Dept: HEALTH INFORMATION MANAGEMENT | Facility: CLINIC | Age: 64
End: 2024-04-10
Payer: COMMERCIAL

## 2024-04-10 LAB — RETINOPATHY: NEGATIVE

## 2024-04-24 LAB — NONINV COLON CA DNA+OCC BLD SCRN STL QL: NORMAL

## 2024-06-03 ENCOUNTER — ANCILLARY PROCEDURE (OUTPATIENT)
Dept: MAMMOGRAPHY | Facility: CLINIC | Age: 64
End: 2024-06-03
Attending: NURSE PRACTITIONER
Payer: COMMERCIAL

## 2024-06-03 DIAGNOSIS — Z12.31 VISIT FOR SCREENING MAMMOGRAM: ICD-10-CM

## 2024-06-03 PROCEDURE — 77063 BREAST TOMOSYNTHESIS BI: CPT

## 2024-06-09 DIAGNOSIS — E11.9 TYPE 2 DIABETES MELLITUS WITHOUT COMPLICATION, WITHOUT LONG-TERM CURRENT USE OF INSULIN (H): ICD-10-CM

## 2024-06-12 ENCOUNTER — OFFICE VISIT (OUTPATIENT)
Dept: FAMILY MEDICINE | Facility: CLINIC | Age: 64
End: 2024-06-12
Payer: COMMERCIAL

## 2024-06-12 VITALS
RESPIRATION RATE: 15 BRPM | WEIGHT: 231 LBS | BODY MASS INDEX: 39.44 KG/M2 | OXYGEN SATURATION: 96 % | HEART RATE: 78 BPM | TEMPERATURE: 97.1 F | SYSTOLIC BLOOD PRESSURE: 127 MMHG | HEIGHT: 64 IN | DIASTOLIC BLOOD PRESSURE: 84 MMHG

## 2024-06-12 DIAGNOSIS — Z48.02 ENCOUNTER FOR REMOVAL OF SUTURES: Primary | ICD-10-CM

## 2024-06-12 PROCEDURE — 99213 OFFICE O/P EST LOW 20 MIN: CPT | Performed by: FAMILY MEDICINE

## 2024-06-12 ASSESSMENT — PAIN SCALES - GENERAL: PAINLEVEL: NO PAIN (0)

## 2024-06-12 NOTE — PROGRESS NOTES
"  Assessment & Plan     Encounter for removal of sutures  Staple was removed without incident        MED REC REQUIRED  Post Medication Reconciliation Status:   Nicotine/Tobacco Cessation  She reports that she has been smoking cigarettes. She has a 7.5 pack-year smoking history. She has been exposed to tobacco smoke. She has never used smokeless tobacco.  Nicotine/Tobacco Cessation Plan        BMI  Estimated body mass index is 39.65 kg/m  as calculated from the following:    Height as of this encounter: 1.626 m (5' 4\").    Weight as of this encounter: 104.8 kg (231 lb).             Subjective   Rochelle is a 63 year old, presenting for the following health issues:  Follow Up (ER Follow up staple removal [facility])      6/12/2024     9:11 AM   Additional Questions   Roomed by Shasha DAVIS patient lacerated her head and inadvertently on the crown on the right side a staple was inspected and removed with the appropriate instrument.  Wound looks good patient reassured immunizations up-to-date              Review of Systems  Constitutional, HEENT, cardiovascular, pulmonary, gi and gu systems are negative, except as otherwise noted.      Objective    /84   Pulse 78   Temp 97.1  F (36.2  C)   Resp 15   Ht 1.626 m (5' 4\")   Wt 104.8 kg (231 lb)   SpO2 96%   Breastfeeding No   BMI 39.65 kg/m    Body mass index is 39.65 kg/m .  Physical Exam   GENERAL: alert and no distress  NECK: no adenopathy, no asymmetry, masses, or scars  RESP: lungs clear to auscultation - no rales, rhonchi or wheezes  CV: regular rate and rhythm, normal S1 S2, no S3 or S4, no murmur, click or rub, no peripheral edema  ABDOMEN: soft, nontender, no hepatosplenomegaly, no masses and bowel sounds normal  MS: no gross musculoskeletal defects noted, no edema    Skin-staple removed from scalp        Signed Electronically by: Shane Lees MD    "

## 2024-06-28 DIAGNOSIS — R19.5 POSITIVE COLORECTAL CANCER SCREENING USING COLOGUARD TEST: Primary | ICD-10-CM

## 2024-06-28 LAB — NONINV COLON CA DNA+OCC BLD SCRN STL QL: POSITIVE

## 2024-07-01 ENCOUNTER — TELEPHONE (OUTPATIENT)
Dept: FAMILY MEDICINE | Facility: CLINIC | Age: 64
End: 2024-07-01
Payer: COMMERCIAL

## 2024-07-01 NOTE — TELEPHONE ENCOUNTER
Writer called patient and left message to return call to clinic.     If patient returns call please route to nurse queue to discuss lab result note per provider. .    ERNESTO Gill, RN  [facility]        ----- Message from Leonora Dolan sent at 6/28/2024  6:14 PM CDT -----  Please notify the patient that her cologuard test is positive which means she may have a precancerous or cancerous colon polyp.  I placed an order for a colonoscopy at [facility] in [place] to evaluate this further.  Please provide the phone number.  Thanks.

## 2024-07-01 NOTE — TELEPHONE ENCOUNTER
Patient called back into the clinic to discuss PCP's lab result note from 6/28/24-see message on 6/28/24 regarding colonoscopy.    Writer relayed the above information to the patient, who verbalized understanding and agrees with the plan.    Writer gave the contact phone number for [facility] to the patient, who stated she will call [facility] to schedule the colonoscopy.    See [facility] contact information below:    [facility]  [address] [address]  Phone: [phone]    Denies other questions or concerns at this time.    Nita Swift RN, BSN  [facility]

## 2024-08-07 ENCOUNTER — TRANSFERRED RECORDS (OUTPATIENT)
Dept: HEALTH INFORMATION MANAGEMENT | Facility: CLINIC | Age: 64
End: 2024-08-07

## 2024-08-24 DIAGNOSIS — E11.9 TYPE 2 DIABETES MELLITUS WITHOUT COMPLICATION, WITHOUT LONG-TERM CURRENT USE OF INSULIN (H): ICD-10-CM

## 2024-11-09 DIAGNOSIS — M47.816 LUMBAR SPONDYLOSIS: ICD-10-CM

## 2024-11-09 DIAGNOSIS — E11.9 TYPE 2 DIABETES MELLITUS WITHOUT COMPLICATION, WITHOUT LONG-TERM CURRENT USE OF INSULIN (H): ICD-10-CM

## 2024-11-11 RX ORDER — IBUPROFEN 600 MG/1
TABLET, FILM COATED ORAL
Qty: 30 TABLET | Refills: 11 | Status: SHIPPED | OUTPATIENT
Start: 2024-11-11

## 2024-11-13 ENCOUNTER — OFFICE VISIT (OUTPATIENT)
Dept: FAMILY MEDICINE | Facility: CLINIC | Age: 64
End: 2024-11-13
Payer: COMMERCIAL

## 2024-11-13 VITALS
RESPIRATION RATE: 16 BRPM | HEART RATE: 93 BPM | OXYGEN SATURATION: 100 % | DIASTOLIC BLOOD PRESSURE: 80 MMHG | TEMPERATURE: 98 F | SYSTOLIC BLOOD PRESSURE: 126 MMHG | BODY MASS INDEX: 39.67 KG/M2 | HEIGHT: 64 IN | WEIGHT: 232.4 LBS

## 2024-11-13 DIAGNOSIS — I10 ESSENTIAL HYPERTENSION: ICD-10-CM

## 2024-11-13 DIAGNOSIS — E11.9 TYPE 2 DIABETES MELLITUS WITHOUT COMPLICATION, WITHOUT LONG-TERM CURRENT USE OF INSULIN (H): Primary | ICD-10-CM

## 2024-11-13 DIAGNOSIS — F33.0 MILD EPISODE OF RECURRENT MAJOR DEPRESSIVE DISORDER (H): ICD-10-CM

## 2024-11-13 DIAGNOSIS — Z87.891 PERSONAL HISTORY OF TOBACCO USE: ICD-10-CM

## 2024-11-13 DIAGNOSIS — E78.2 MIXED HYPERLIPIDEMIA: ICD-10-CM

## 2024-11-13 LAB
EST. AVERAGE GLUCOSE BLD GHB EST-MCNC: 131 MG/DL
HBA1C MFR BLD: 6.2 % (ref 0–5.6)

## 2024-11-13 PROCEDURE — 82570 ASSAY OF URINE CREATININE: CPT | Performed by: NURSE PRACTITIONER

## 2024-11-13 PROCEDURE — G0296 VISIT TO DETERM LDCT ELIG: HCPCS | Performed by: NURSE PRACTITIONER

## 2024-11-13 PROCEDURE — 36415 COLL VENOUS BLD VENIPUNCTURE: CPT | Performed by: NURSE PRACTITIONER

## 2024-11-13 PROCEDURE — 99214 OFFICE O/P EST MOD 30 MIN: CPT | Performed by: NURSE PRACTITIONER

## 2024-11-13 PROCEDURE — 83036 HEMOGLOBIN GLYCOSYLATED A1C: CPT | Performed by: NURSE PRACTITIONER

## 2024-11-13 PROCEDURE — 82043 UR ALBUMIN QUANTITATIVE: CPT | Performed by: NURSE PRACTITIONER

## 2024-11-13 PROCEDURE — G2211 COMPLEX E/M VISIT ADD ON: HCPCS | Performed by: NURSE PRACTITIONER

## 2024-11-13 SDOH — HEALTH STABILITY: PHYSICAL HEALTH: ON AVERAGE, HOW MANY MINUTES DO YOU ENGAGE IN EXERCISE AT THIS LEVEL?: 0 MIN

## 2024-11-13 SDOH — HEALTH STABILITY: PHYSICAL HEALTH: ON AVERAGE, HOW MANY DAYS PER WEEK DO YOU ENGAGE IN MODERATE TO STRENUOUS EXERCISE (LIKE A BRISK WALK)?: 0 DAYS

## 2024-11-13 ASSESSMENT — SOCIAL DETERMINANTS OF HEALTH (SDOH): HOW OFTEN DO YOU GET TOGETHER WITH FRIENDS OR RELATIVES?: PATIENT DECLINED

## 2024-11-13 NOTE — PROGRESS NOTES
Assessment and Plan:     Type 2 diabetes mellitus without complication, without long-term current use of insulin (H)  Will check A1c.  This has been controlled.  She continues metformin.  - Albumin Random Urine Quantitative with Creat Ratio  - HEMOGLOBIN A1C  - metFORMIN (GLUCOPHAGE) 500 MG tablet  Dispense: 160 tablet; Refill: 3  - Albumin Random Urine Quantitative with Creat Ratio  - HEMOGLOBIN A1C    Mild episode of recurrent major depressive disorder (H)  This is controlled.  She continues fluoxetine.    Mixed hyperlipidemia  Goal LDL is less than 70.  She continues atorvastatin.    Hypertension  This is controlled with hydrochlorothiazide.    Personal history of tobacco use  I encouraged smoking cessation.  Patient declines need for smoking cessation products.  - Prof fee: Shared Decision Making for Lung Cancer Screening  - CT Chest Lung Cancer Scrn Low Dose wo    The longitudinal plan of care for the diagnosis(es)/condition(s) as documented were addressed during this visit. Due to the added complexity in care, I will continue to support Rochelle in the subsequent management and with ongoing continuity of care.      Subjective:     Rochelle is a 64 year old female presenting to the clinic for medication management.  Patient has type 2 diabetes, hypertension, hyperlipidemia, PAD.  Patient is taking metformin 500 mg twice daily.  She is tolerating the medication well without any side effects.  Last A1c was 5.7% on 4//24.  She is consuming a healthy diet.  She has been walking stairs for exercise.  She does not monitor her blood sugars.  She denies any sores on her feet.  She obtains an eye exam every 4 months for glaucoma. She is taking hydrochlorothiazide 25 mg daily for blood pressure management.  Depression and anxiety symptoms are controlled with fluoxetine 10 mg daily.  She is taking atorvastatin 40 mg daily for lipid management.  Last cholesterol check was on 4//24 with a total cholesterol 140, triglycerides 336,  HDL 33, LDL 48.  She continues to smoke 9 to 10 cigarettes/day and is not ready to quit.  She is due for lung cancer screening.    Reviewof Systems: A complete 14 point review of systems was obtained and is negative or as stated in the history of present illness.    Social History     Socioeconomic History    Marital status: Single     Spouse name: Not on file    Number of children: Not on file    Years of education: Not on file    Highest education level: Not on file   Occupational History    Not on file   Tobacco Use    Smoking status: Every Day     Current packs/day: 0.50     Average packs/day: 0.5 packs/day for 15.0 years (7.5 ttl pk-yrs)     Types: Cigarettes     Passive exposure: Current    Smokeless tobacco: Never   Vaping Use    Vaping status: Never Used   Substance and Sexual Activity    Alcohol use: No    Drug use: No    Sexual activity: Never   Other Topics Concern    Not on file   Social History Narrative    Not on file     Social Drivers of Health     Financial Resource Strain: Unknown (11/13/2024)    Financial Resource Strain     Within the past 12 months, have you or your family members you live with been unable to get utilities (heat, electricity) when it was really needed?: Patient declined   Food Insecurity: Unknown (11/13/2024)    Food Insecurity     Within the past 12 months, did you worry that your food would run out before you got money to buy more?: Patient declined     Within the past 12 months, did the food you bought just not last and you didn t have money to get more?: Patient declined   Transportation Needs: Unknown (11/13/2024)    Transportation Needs     Within the past 12 months, has lack of transportation kept you from medical appointments, getting your medicines, non-medical meetings or appointments, work, or from getting things that you need?: Patient declined   Physical Activity: Inactive (11/13/2024)    Exercise Vital Sign     Days of Exercise per Week: 0 days     Minutes of  Exercise per Session: 0 min   Stress: No Stress Concern Present (11/13/2024)    [place] [employer] - Occupational Stress Questionnaire     Feeling of Stress : Not at all   Social Connections: Unknown (11/13/2024)    Social Connection and Isolation Panel [NHANES]     Frequency of Communication with Friends and Family: Not on file     Frequency of Social Gatherings with Friends and Family: Patient declined     Attends Temple Services: Not on file     Active Member of Clubs or Organizations: Not on file     Attends Club or Organization Meetings: Not on file     Marital Status: Not on file   Interpersonal Safety: Low Risk  (4/4/2024)    Interpersonal Safety     Do you feel physically and emotionally safe where you currently live?: Yes     Within the past 12 months, have you been hit, slapped, kicked or otherwise physically hurt by someone?: No     Within the past 12 months, have you been humiliated or emotionally abused in other ways by your partner or ex-partner?: No   Housing Stability: Unknown (11/13/2024)    Housing Stability     Do you have housing? : Patient declined     Are you worried about losing your housing?: Patient declined       Active Ambulatory Problems     Diagnosis Date Noted    Osteoarthrosis     Rosacea     Insomnia     Edema     Allergies     Morbid Obesity     Major Depression, Recurrent     Obstructive sleep apnea     Hypertension     Diverticulitis Of Colon     Abnormal involuntary movement     Mixed hyperlipidemia 06/12/2017    Polyarthralgia 08/02/2017    Primary osteoarthritis involving multiple joints 08/02/2017    CMC DJD(carpometacarpal degenerative joint disease), localized primary 08/02/2017    Lumbar spondylosis 11/06/2017    Smoking 01/08/2021    Diabetes mellitus, type 2 (H) 11/22/2021    PAD (peripheral artery disease) (H) 02/04/2022    Elevated hemoglobin (H) 02/04/2022     Resolved Ambulatory Problems     Diagnosis Date Noted    Rheumatoid arthritis (H)  "06/06/2016     Past Medical History:   Diagnosis Date    Anxiety     Depression     Hyperlipidemia     Hypertension     Ingrown toenail     Onychomycosis     Painful swelling of joint     Sleep apnea        Family History   Problem Relation Age of Onset    Breast Cancer Mother 49.00    Hypertension Father     Dementia Father     No Known Problems Sister     No Known Problems Daughter     No Known Problems Maternal Grandmother     No Known Problems Maternal Grandfather     Diabetes Paternal Grandmother     No Known Problems Paternal Grandfather     No Known Problems Maternal Aunt     No Known Problems Paternal Aunt     Hereditary Breast and Ovarian Cancer Syndrome No family hx of     Colon Cancer No family hx of     Endometrial Cancer No family hx of     Ovarian Cancer No family hx of        Objective:     /80   Pulse 93   Temp 98  F (36.7  C) (Temporal)   Resp 16   Ht 1.626 m (5' 4\")   Wt 105.4 kg (232 lb 6.4 oz)   SpO2 100%   BMI 39.89 kg/m      Patient is alert, in no obvious distress.   Skin: Warm, dry.    Lungs:  Clear to auscultation. Respirations even and unlabored.  No wheezing or rales noted.   Heart:  Regular rate and rhythm.  No murmurs, S3, S4, gallops, or rubs.    Musculoskeletal:  Monofilament testing is normal bilaterally.             Answers submitted by the patient for this visit:  Patient Health Questionnaire (Submitted on 11/13/2024)  PHQ9 TOTAL SCORE: Incomplete  Lung Cancer Screening Shared Decision Making Visit     Rochelle Garza, a 64 year old female, is eligible for lung cancer screening    History   Smoking Status    Every Day    Types: Cigarettes   Smokeless Tobacco    Never       I have discussed with patient the risks and benefits of screening for lung cancer with low-dose CT.     The risks include:    radiation exposure: one low dose chest CT has as much ionizing radiation as about 15 chest x-rays, or 6 months of background radiation living in [place]      false positives: most " findings/nodules are NOT cancer, but some might still require additional diagnostic evaluation, including biopsy    over-diagnosis: some slow growing cancers that might never have been clinically significant will be detected and treated unnecessarily     The benefit of early detection of lung cancer is contingent upon adherence to annual screening or more frequent follow up if indicated.     Furthermore, to benefit from screening, Rochelle must be willing and able to undergo diagnostic procedures, if indicated. Although no specific guide is available for determining severity of comorbidities, it is reasonable to withhold screening in patients who have greater mortality risk from other diseases.     We did discuss that the best way to prevent lung cancer is to not smoke.    Some patients may value a numeric estimation of lung cancer risk when evaluating if lung cancer screening is right for them, here is one calculator:    ShouldIScreen

## 2024-11-13 NOTE — PATIENT INSTRUCTIONS
Lung Cancer Screening   Frequently Asked Questions  If you are at high-risk for lung cancer, getting screened with low-dose computed tomography (LDCT) every year can help save your life. This handout offers answers to some of the most common questions about lung cancer screening. If you have other questions, please call [phone] ([phone]).     What is it?  Lung cancer screening uses special X-ray technology to create an image of your lung tissue. The exam is quick and easy and takes less than 10 seconds. We don t give you any medicine or use any needles. You can eat before and after the exam. You don t need to change your clothes as long as the clothing on your chest doesn t contain metal. But, you do need to be able to hold your breath for at least 6 seconds during the exam.    What is the goal of lung cancer screening?  The goal of lung cancer screening is to save lives. Many times, lung cancer is not found until a person starts having physical symptoms. Lung cancer screening can help detect lung cancer in the earliest stages when it may be easier to treat.    Who should be screened for lung cancer?  We suggest lung cancer screening for anyone who is at high-risk for lung cancer. You are in the high-risk group if you:      are between the ages of 55 and 79, and    have smoked at least 1 pack of cigarettes a day for 20 or more years, and    still smoke or have quit within the past 15 years.    However, if you have a new cough or shortness of breath, you should talk to your doctor before being screened.    Why does it matter if I have symptoms?  Certain symptoms can be a sign that you have a condition in your lungs that should be checked and treated by your doctor. These symptoms include fever, chest pain, a new or changing cough, shortness of breath that you have never felt before, coughing up blood or unexplained weight loss. Having any of these symptoms can greatly affect the results of lung  cancer screening.       Should all smokers get an LDCT lung cancer screening exam?  It depends. Lung cancer screening is for a very specific group of men and women who have a history of heavy smoking over a long period of time (see  Who should be screened for lung cancer  above).  I am in the high-risk group, but have been diagnosed with cancer in the past. Is LDCT lung cancer screening right for me?  In some cases, you should not have LDCT lung screening, such as when your doctor is already following your cancer with CT scan studies. Your doctor will help you decide if LDCT lung screening is right for you.  Do I need to have a screening exam every year?  Yes. If you are in the high-risk group described earlier, you should get an LDCT lung cancer screening exam every year until you are 79, or are no longer willing or able to undergo screening and possible procedures to diagnose and treat lung cancer.  How effective is LDCT at preventing death from lung cancer?  Studies have shown that LDCT lung cancer screening can lower the risk of death from lung cancer by 20 percent in people who are at high-risk.  What are the risks?  There are some risks and limitations of LDCT lung cancer screening. We want to make sure you understand the risks and benefits, so please let us know if you have any questions. Your doctor may want to talk with you more about these risks.    Radiation exposure: As with any exam that uses radiation, there is a very small increased risk of cancer. The amount of radiation in LDCT is small--about the same amount a person would get from a mammogram. Your doctor orders the exam when he or she feels the potential benefits outweigh the risks.    False negatives: No test is perfect, including LDCT. It is possible that you may have a medical condition, including lung cancer, that is not found during your exam. This is called a false negative result.    False positives and more testing: LDCT very often finds  something in the lung that could be cancer, but in fact is not. This is called a false positive result. False positive tests often cause anxiety. To make sure these findings are not cancer, you may need to have more tests. These tests will be done only if you give us permission. Sometimes patients need a treatment that can have side effects, such as a biopsy. For more information on false positives, see  What can I expect from the results?     Findings not related to lung cancer: Your LDCT exam also takes pictures of areas of your body next to your lungs. In a very small number of cases, the CT scan will show an abnormal finding in one of these areas, such as your kidneys, adrenal glands, liver or thyroid. This finding may not be serious, but you may need more tests. Your doctor can help you decide what other tests you may need, if any.  What can I expect from the results?  About 1 out of 4 LDCT exams will find something that may need more tests. Most of the time, these findings are lung nodules. Lung nodules are very small collections of tissue in the lung. These nodules are very common, and the vast majority--more than 97 percent--are not cancer (benign). Most are normal lymph nodes or small areas of scarring from past infections.  But, if a small lung nodule is found to be cancer, the cancer can be cured more than 90 percent of the time. To know if the nodule is cancer, we may need to get more images before your next yearly screening exam. If the nodule has suspicious features (for example, it is large, has an odd shape or grows over time), we will refer you to a specialist for further testing.  Will my doctor also get the results?  Yes. Your doctor will get a copy of your results.  Is it okay to keep smoking now that there s a cancer screening exam?  No. Tobacco is one of the strongest cancer-causing agents. It causes not only lung cancer, but other cancers and cardiovascular (heart) diseases as well. The damage  caused by smoking builds over time. This means that the longer you smoke, the higher your risk of disease. While it is never too late to quit, the sooner you quit, the better.  Where can I find help to quit smoking?  The best way to prevent lung cancer is to stop smoking. If you have already quit smoking, congratulations and keep it up! For help on quitting smoking, please call [employer] at 5-220-QUITNOW (1-433.136.4437) or the [employer] at 1-279.284.3557 to find local resources near you.  One-on-one health coaching:  If you d prefer to work individually with a health care provider on tobacco cessation, we offer:      Medication Therapy Management:  Our specially trained pharmacists work closely with you and your doctor to help you quit smoking.  Call [phone] or [phone] (toll free).

## 2024-11-13 NOTE — LETTER
November 14, 2024      Rochelle Garza  [address]  [place] [address]        Dear ,    We are writing to inform you of your test results.    Your A1C remains controlled.      Your urine screen was normal.  No evidence for significant protein in your urine.  We will plan to repeat this test on a yearly basis.      Resulted Orders   Albumin Random Urine Quantitative with Creat Ratio   Result Value Ref Range    Creatinine Urine mg/dL 105.0 mg/dL      Comment:      The reference ranges have not been established in urine creatinine. The results should be integrated into the clinical context for interpretation.    Albumin Urine mg/L <12.0 mg/L      Comment:      The reference ranges have not been established in urine albumin. The results should be integrated into the clinical context for interpretation.    Albumin Urine mg/g Cr        Comment:      Unable to calculate, urine albumin and/or urine creatinine is outside detectable limits.  Microalbuminuria is defined as an albumin:creatinine ratio of 17 to 299 for males and 25 to 299 for females. A ratio of albumin:creatinine of 300 or higher is indicative of overt proteinuria.  Due to biologic variability, positive results should be confirmed by a second, first-morning random or 24-hour timed urine specimen. If there is discrepancy, a third specimen is recommended. When 2 out of 3 results are in the microalbuminuria range, this is evidence for incipient nephropathy and warrants increased efforts at glucose control, blood pressure control, and institution of therapy with an angiotensin-converting-enzyme (ACE) inhibitor (if the patient can tolerate it).     HEMOGLOBIN A1C   Result Value Ref Range    Estimated Average Glucose 131 (H) <117 mg/dL    Hemoglobin A1C 6.2 (H) 0.0 - 5.6 %      Comment:      Normal <5.7%   Prediabetes 5.7-6.4%    Diabetes 6.5% or higher     Note: Adopted from ADA consensus guidelines.       If you have any questions or concerns,  please call the clinic at the number listed above.       Sincerely,      ERIC Fairchild CNP

## 2024-11-14 LAB
CREAT UR-MCNC: 105 MG/DL
MICROALBUMIN UR-MCNC: <12 MG/L
MICROALBUMIN/CREAT UR: NORMAL MG/G{CREAT}

## 2025-01-05 DIAGNOSIS — M47.816 LUMBAR SPONDYLOSIS: ICD-10-CM

## 2025-01-06 RX ORDER — IBUPROFEN 600 MG/1
TABLET, FILM COATED ORAL
Qty: 360 TABLET | Refills: 3 | Status: SHIPPED | OUTPATIENT
Start: 2025-01-06

## 2025-03-03 ENCOUNTER — TELEPHONE (OUTPATIENT)
Dept: FAMILY MEDICINE | Facility: CLINIC | Age: 65
End: 2025-03-03
Payer: COMMERCIAL

## 2025-03-03 NOTE — TELEPHONE ENCOUNTER
Forms/Letter Request    Type of form/letter: Nursing Home/Assisted Living Orders      Do we have the form/letter: Yes: Placed in Leonora in box    Who is the form from? Home care [facility]    Where did/will the form come from? form was faxed in    When is form/letter needed by: when done    How would you like the form/letter returned: Fax : [phone]      Order details/form description:     Order number (if applicable): [id number]

## 2025-03-05 ENCOUNTER — PATIENT OUTREACH (OUTPATIENT)
Dept: CARE COORDINATION | Facility: CLINIC | Age: 65
End: 2025-03-05
Payer: COMMERCIAL

## 2025-03-19 ENCOUNTER — PATIENT OUTREACH (OUTPATIENT)
Dept: CARE COORDINATION | Facility: CLINIC | Age: 65
End: 2025-03-19
Payer: COMMERCIAL

## 2025-03-25 DIAGNOSIS — F33.0 MILD EPISODE OF RECURRENT MAJOR DEPRESSIVE DISORDER: ICD-10-CM

## 2025-03-26 DIAGNOSIS — E78.2 MIXED HYPERLIPIDEMIA: ICD-10-CM

## 2025-03-26 DIAGNOSIS — I10 BENIGN ESSENTIAL HYPERTENSION: ICD-10-CM

## 2025-03-26 RX ORDER — FLUOXETINE 10 MG/1
10 CAPSULE ORAL DAILY
Qty: 90 CAPSULE | Refills: 3 | Status: SHIPPED | OUTPATIENT
Start: 2025-03-26

## 2025-03-27 RX ORDER — HYDROCHLOROTHIAZIDE 25 MG/1
25 TABLET ORAL DAILY
Qty: 90 TABLET | Refills: 1 | Status: SHIPPED | OUTPATIENT
Start: 2025-03-27

## 2025-03-27 RX ORDER — ATORVASTATIN CALCIUM 40 MG/1
40 TABLET, FILM COATED ORAL
Qty: 90 TABLET | Refills: 0 | Status: SHIPPED | OUTPATIENT
Start: 2025-03-27

## 2025-04-01 PROBLEM — F17.200 NICOTINE DEPENDENCE, UNSPECIFIED, UNCOMPLICATED: Status: ACTIVE | Noted: 2021-01-08

## 2025-04-16 ENCOUNTER — TRANSFERRED RECORDS (OUTPATIENT)
Dept: HEALTH INFORMATION MANAGEMENT | Facility: CLINIC | Age: 65
End: 2025-04-16
Payer: COMMERCIAL

## 2025-04-16 LAB — RETINOPATHY: NEGATIVE

## 2025-05-20 ENCOUNTER — TRANSFERRED RECORDS (OUTPATIENT)
Dept: HEALTH INFORMATION MANAGEMENT | Facility: CLINIC | Age: 65
End: 2025-05-20

## 2025-06-03 ENCOUNTER — TRANSFERRED RECORDS (OUTPATIENT)
Dept: HEALTH INFORMATION MANAGEMENT | Facility: CLINIC | Age: 65
End: 2025-06-03

## 2025-06-05 ENCOUNTER — ANCILLARY PROCEDURE (OUTPATIENT)
Dept: MAMMOGRAPHY | Facility: CLINIC | Age: 65
End: 2025-06-05
Attending: NURSE PRACTITIONER
Payer: COMMERCIAL

## 2025-06-05 DIAGNOSIS — Z12.31 VISIT FOR SCREENING MAMMOGRAM: ICD-10-CM

## 2025-06-05 PROCEDURE — 77063 BREAST TOMOSYNTHESIS BI: CPT

## 2025-06-07 DIAGNOSIS — E78.2 MIXED HYPERLIPIDEMIA: ICD-10-CM

## 2025-06-09 RX ORDER — ATORVASTATIN CALCIUM 40 MG/1
40 TABLET, FILM COATED ORAL
Qty: 90 TABLET | Refills: 3 | Status: SHIPPED | OUTPATIENT
Start: 2025-06-09

## 2025-06-17 ENCOUNTER — MEDICAL CORRESPONDENCE (OUTPATIENT)
Dept: HEALTH INFORMATION MANAGEMENT | Facility: CLINIC | Age: 65
End: 2025-06-17
Payer: COMMERCIAL

## 2025-06-17 DIAGNOSIS — I10 BENIGN ESSENTIAL HYPERTENSION: ICD-10-CM

## 2025-06-18 RX ORDER — HYDROCHLOROTHIAZIDE 25 MG/1
25 TABLET ORAL DAILY
Qty: 100 TABLET | Refills: 2 | OUTPATIENT
Start: 2025-06-18

## 2025-07-03 ENCOUNTER — OFFICE VISIT (OUTPATIENT)
Dept: FAMILY MEDICINE | Facility: CLINIC | Age: 65
End: 2025-07-03
Attending: NURSE PRACTITIONER
Payer: COMMERCIAL

## 2025-07-03 VITALS
DIASTOLIC BLOOD PRESSURE: 74 MMHG | HEIGHT: 64 IN | SYSTOLIC BLOOD PRESSURE: 128 MMHG | TEMPERATURE: 97.2 F | BODY MASS INDEX: 39.95 KG/M2 | RESPIRATION RATE: 24 BRPM | WEIGHT: 234 LBS | OXYGEN SATURATION: 96 % | HEART RATE: 67 BPM

## 2025-07-03 DIAGNOSIS — I10 BENIGN ESSENTIAL HYPERTENSION: ICD-10-CM

## 2025-07-03 DIAGNOSIS — Z79.899 MEDICATION MANAGEMENT: ICD-10-CM

## 2025-07-03 DIAGNOSIS — E78.2 MIXED HYPERLIPIDEMIA: ICD-10-CM

## 2025-07-03 DIAGNOSIS — Z00.00 ENCOUNTER FOR MEDICARE ANNUAL WELLNESS EXAM: Primary | ICD-10-CM

## 2025-07-03 DIAGNOSIS — F33.0 MILD EPISODE OF RECURRENT MAJOR DEPRESSIVE DISORDER: ICD-10-CM

## 2025-07-03 DIAGNOSIS — R19.5 POSITIVE COLORECTAL CANCER SCREENING USING COLOGUARD TEST: ICD-10-CM

## 2025-07-03 DIAGNOSIS — E66.01 MORBID OBESITY (H): ICD-10-CM

## 2025-07-03 DIAGNOSIS — F17.210 CIGARETTE NICOTINE DEPENDENCE WITHOUT COMPLICATION: ICD-10-CM

## 2025-07-03 DIAGNOSIS — E11.9 TYPE 2 DIABETES MELLITUS WITHOUT COMPLICATION, WITHOUT LONG-TERM CURRENT USE OF INSULIN (H): ICD-10-CM

## 2025-07-03 LAB
ALBUMIN SERPL BCG-MCNC: 4 G/DL (ref 3.5–5.2)
ALBUMIN UR-MCNC: NEGATIVE MG/DL
ALP SERPL-CCNC: 63 U/L (ref 40–150)
ALT SERPL W P-5'-P-CCNC: 32 U/L (ref 0–50)
ANION GAP SERPL CALCULATED.3IONS-SCNC: 13 MMOL/L (ref 7–15)
APPEARANCE UR: CLEAR
AST SERPL W P-5'-P-CCNC: 22 U/L (ref 0–45)
BILIRUB SERPL-MCNC: 0.3 MG/DL
BILIRUB UR QL STRIP: NEGATIVE
BUN SERPL-MCNC: 14.1 MG/DL (ref 8–23)
CALCIUM SERPL-MCNC: 9.5 MG/DL (ref 8.8–10.4)
CHLORIDE SERPL-SCNC: 104 MMOL/L (ref 98–107)
CHOLEST SERPL-MCNC: 162 MG/DL
COLOR UR AUTO: YELLOW
CREAT SERPL-MCNC: 0.75 MG/DL (ref 0.51–0.95)
EGFRCR SERPLBLD CKD-EPI 2021: 88 ML/MIN/1.73M2
ERYTHROCYTE [DISTWIDTH] IN BLOOD BY AUTOMATED COUNT: 13 % (ref 10–15)
EST. AVERAGE GLUCOSE BLD GHB EST-MCNC: 131 MG/DL
FASTING STATUS PATIENT QL REPORTED: YES
FASTING STATUS PATIENT QL REPORTED: YES
GLUCOSE SERPL-MCNC: 136 MG/DL (ref 70–99)
GLUCOSE UR STRIP-MCNC: NEGATIVE MG/DL
HBA1C MFR BLD: 6.2 % (ref 0–5.6)
HCO3 SERPL-SCNC: 24 MMOL/L (ref 22–29)
HCT VFR BLD AUTO: 47.8 % (ref 35–47)
HDLC SERPL-MCNC: 37 MG/DL
HGB BLD-MCNC: 15.9 G/DL (ref 11.7–15.7)
HGB UR QL STRIP: NEGATIVE
KETONES UR STRIP-MCNC: NEGATIVE MG/DL
LDLC SERPL CALC-MCNC: 95 MG/DL
LEUKOCYTE ESTERASE UR QL STRIP: NEGATIVE
MCH RBC QN AUTO: 31.1 PG (ref 26.5–33)
MCHC RBC AUTO-ENTMCNC: 33.3 G/DL (ref 31.5–36.5)
MCV RBC AUTO: 94 FL (ref 78–100)
NITRATE UR QL: NEGATIVE
NONHDLC SERPL-MCNC: 125 MG/DL
PH UR STRIP: 5.5 [PH] (ref 5–8)
PLATELET # BLD AUTO: 197 10E3/UL (ref 150–450)
POTASSIUM SERPL-SCNC: 3.7 MMOL/L (ref 3.4–5.3)
PROT SERPL-MCNC: 6.6 G/DL (ref 6.4–8.3)
RBC # BLD AUTO: 5.11 10E6/UL (ref 3.8–5.2)
SODIUM SERPL-SCNC: 141 MMOL/L (ref 135–145)
SP GR UR STRIP: >=1.03 (ref 1–1.03)
TRIGL SERPL-MCNC: 148 MG/DL
TSH SERPL DL<=0.005 MIU/L-ACNC: 1.02 UIU/ML (ref 0.3–4.2)
UROBILINOGEN UR STRIP-ACNC: 0.2 E.U./DL
WBC # BLD AUTO: 5.8 10E3/UL (ref 4–11)

## 2025-07-03 RX ORDER — HYDROCHLOROTHIAZIDE 25 MG/1
25 TABLET ORAL DAILY
Qty: 90 TABLET | Refills: 3 | Status: SHIPPED | OUTPATIENT
Start: 2025-07-03 | End: 2025-07-03

## 2025-07-03 RX ORDER — HYDROCHLOROTHIAZIDE 25 MG/1
25 TABLET ORAL DAILY
Qty: 90 TABLET | Refills: 3 | Status: SHIPPED | OUTPATIENT
Start: 2025-07-03

## 2025-07-03 RX ORDER — ATORVASTATIN CALCIUM 40 MG/1
40 TABLET, FILM COATED ORAL
Qty: 90 TABLET | Refills: 3 | Status: SHIPPED | OUTPATIENT
Start: 2025-07-03

## 2025-07-03 RX ORDER — FLUOXETINE 10 MG/1
10 CAPSULE ORAL DAILY
Qty: 90 CAPSULE | Refills: 3 | Status: SHIPPED | OUTPATIENT
Start: 2025-07-03

## 2025-07-03 SDOH — HEALTH STABILITY: PHYSICAL HEALTH: ON AVERAGE, HOW MANY DAYS PER WEEK DO YOU ENGAGE IN MODERATE TO STRENUOUS EXERCISE (LIKE A BRISK WALK)?: 1 DAY

## 2025-07-03 ASSESSMENT — PATIENT HEALTH QUESTIONNAIRE - PHQ9
10. IF YOU CHECKED OFF ANY PROBLEMS, HOW DIFFICULT HAVE THESE PROBLEMS MADE IT FOR YOU TO DO YOUR WORK, TAKE CARE OF THINGS AT HOME, OR GET ALONG WITH OTHER PEOPLE: NOT DIFFICULT AT ALL
SUM OF ALL RESPONSES TO PHQ QUESTIONS 1-9: 0
SUM OF ALL RESPONSES TO PHQ QUESTIONS 1-9: 0

## 2025-07-03 ASSESSMENT — ANXIETY QUESTIONNAIRES
5. BEING SO RESTLESS THAT IT IS HARD TO SIT STILL: NOT AT ALL
IF YOU CHECKED OFF ANY PROBLEMS ON THIS QUESTIONNAIRE, HOW DIFFICULT HAVE THESE PROBLEMS MADE IT FOR YOU TO DO YOUR WORK, TAKE CARE OF THINGS AT HOME, OR GET ALONG WITH OTHER PEOPLE: NOT DIFFICULT AT ALL
4. TROUBLE RELAXING: NOT AT ALL
6. BECOMING EASILY ANNOYED OR IRRITABLE: NOT AT ALL
2. NOT BEING ABLE TO STOP OR CONTROL WORRYING: NOT AT ALL
8. IF YOU CHECKED OFF ANY PROBLEMS, HOW DIFFICULT HAVE THESE MADE IT FOR YOU TO DO YOUR WORK, TAKE CARE OF THINGS AT HOME, OR GET ALONG WITH OTHER PEOPLE?: NOT DIFFICULT AT ALL
GAD7 TOTAL SCORE: 0
1. FEELING NERVOUS, ANXIOUS, OR ON EDGE: NOT AT ALL
7. FEELING AFRAID AS IF SOMETHING AWFUL MIGHT HAPPEN: NOT AT ALL
3. WORRYING TOO MUCH ABOUT DIFFERENT THINGS: NOT AT ALL
7. FEELING AFRAID AS IF SOMETHING AWFUL MIGHT HAPPEN: NOT AT ALL

## 2025-07-03 ASSESSMENT — SOCIAL DETERMINANTS OF HEALTH (SDOH): HOW OFTEN DO YOU GET TOGETHER WITH FRIENDS OR RELATIVES?: MORE THAN THREE TIMES A WEEK

## 2025-07-03 ASSESSMENT — PAIN SCALES - GENERAL: PAINLEVEL_OUTOF10: NO PAIN (0)

## 2025-07-03 NOTE — PROGRESS NOTES
Preventive Care Visit  [facility]  ERIC Fairchild CNP, Family Medicine  Jul 3, 2025      Assessment & Plan     Encounter for Medicare annual wellness exam  Recommend consuming a healthy diet and exercising.  Pneumococcal and COVID vaccines provided.  She declines CT for lung cancer screening.  Colonoscopy ordered.  I stressed the importance of pursuing this.  She is up-to-date on breast cancer screening.  - CBC with platelets  - TSH with free T4 reflex  - UA Macroscopic with reflex to Microscopic and Culture    Positive colorectal cancer screening using Cologuard test  We discussed positive Cologuard results.  Colonoscopy ordered.  - Colonoscopy Screening  Referral    Type 2 diabetes mellitus without complication, without long-term current use of insulin (H)  Will check A1c.  She has not been taking metformin for 2 months.  - Hemoglobin A1c  - metFORMIN (GLUCOPHAGE) 500 MG tablet  Dispense: 180 tablet; Refill: 1    Benign essential hypertension  This is controlled.  She continues hydrochlorothiazide.  - hydrochlorothiazide (HYDRODIURIL) 25 MG tablet  Dispense: 90 tablet; Refill: 3    Mixed hyperlipidemia  Goal LDL is less than 100.  She continues atorvastatin.  - Lipid panel reflex to direct LDL Fasting  - atorvastatin (LIPITOR) 40 MG tablet  Dispense: 90 tablet; Refill: 3    Mild episode of recurrent major depressive disorder  This is controlled with fluoxetine.  - FLUoxetine (PROZAC) 10 MG capsule  Dispense: 90 capsule; Refill: 3    Depression Screening Follow Up        7/3/2025     8:35 AM   PHQ   PHQ-9 Total Score 0    Q9: Thoughts of better off dead/self-harm past 2 weeks Not at all       Patient-reported         Follow Up Actions Taken  Crisis resource information provided in After Visit Summary           Morbid obesity (H)  Recommend consuming a healthy diet and exercising.  This is contributing to hypertension, diabetes, and hyperlipidemia.    Cigarette nicotine dependence  "without complication  Offered smoking cessation options, but she declines.    Medication management  - Comprehensive metabolic panel    The longitudinal plan of care for the diagnosis(es)/condition(s) as documented were addressed during this visit. Due to the added complexity in care, I will continue to support Rochelle in the subsequent management and with ongoing continuity of care.          Nicotine/Tobacco Cessation  She reports that she has been smoking cigarettes. She has a 7.5 pack-year smoking history. She has been exposed to tobacco smoke. She has never used smokeless tobacco.  Nicotine/Tobacco Cessation Plan  Information offered: Patient not interested at this time      BMI  Estimated body mass index is 40.17 kg/m  as calculated from the following:    Height as of this encounter: 1.626 m (5' 4\").    Weight as of this encounter: 106.1 kg (234 lb).   Weight management plan: Discussed healthy diet and exercise guidelines  Reviewed preventive health counseling, as reflected in patient instructions  Counseling  Appropriate preventive services were addressed with this patient via screening, questionnaire, or discussion as appropriate for fall prevention, nutrition, physical activity, Tobacco-use cessation, social engagement, weight loss and cognition.  Checklist reviewing preventive services available has been given to the patient.  Reviewed patient's diet, addressing concerns and/or questions.   She is at risk for lack of exercise and has been provided with information to increase physical activity for the benefit of her well-being.   Information on urinary incontinence and treatment options given to patient.           Subjective   Rochelle is a 64 year old, presenting for the following:  Wellness Visit and Medication Refill        7/3/2025     8:52 AM   Additional Questions   Roomed by Shasha          Medication Refill      Patient is single and not sexually active.  She has not been sexually active in the past.  " She has no concerns for STDs.  She declines Pap smear today. Patient has multiple comorbidities including type 2 diabetes, hypertension, hyperlipidemia, PAD.  She has a history of an elevated hemoglobin and saw oncology.  No polycythemia vera or myeloproliferative disease noted. Patient has type 2 diabetes, hypertension, hyperlipidemia, PAD.  Patient is prescribed metformin 500 mg twice daily.  Patient states she has been without the medication for 2 months.  She has not been interested in GLP-1 as she does not want to inject herself with the medication.  Last A1c was 6.2% on 11/13/2024.  She is consuming a healthy diet.  She has been walking around at stores for exercise.  She does not monitor her blood sugars.  She denies any sores on her feet.  She obtains an eye exam every 4 months for glaucoma. She is taking hydrochlorothiazide 25 mg daily for blood pressure management.  Depression and anxiety symptoms are controlled with fluoxetine 10 mg daily.  She denies thoughts of suicide.  She feels well supported.  She is taking atorvastatin 40 mg daily for lipid management.  Last cholesterol check was on 4/4/2024 with a total cholesterol 148, triglycerides 336, HDL 33, LDL 48.  She continues to smoke 9 to 10 cigarettes/day and is not ready to quit.  She has a history of a positive Cologuard in 2024.  She has not pursued a colonoscopy yet.  She has had some intermittent rectal bleeding.    Advance Care Planning    Discussed advance care planning with patient; informed AVS has link to [employer] [employer].        7/3/2025   General Health   How would you rate your overall physical health? Excellent   Feel stress (tense, anxious, or unable to sleep) Not at all         7/3/2025   Nutrition   Diet: Diabetic         7/3/2025   Exercise   Days per week of moderate/strenous exercise 1 day   (!) EXERCISE CONCERN      7/3/2025   Social Factors   Frequency of gathering with friends or relatives More than three times a week   Worry  food won't last until get money to buy more No   Food not last or not have enough money for food? No   Do you have housing? (Housing is defined as stable permanent housing and does not include staying outside in a car, in a tent, in an abandoned building, in an overnight shelter, or couch-surfing.) No   Are you worried about losing your housing? No   Lack of transportation? No   Unable to get utilities (heat,electricity)? No   Want help with housing or utility concern? No   (!) HOUSING CONCERN PRESENT      7/3/2025   Fall Risk   Fallen 2 or more times in the past year? No   Trouble with walking or balance? No          7/3/2025   Activities of Daily Living- Home Safety   Needs help with the following daily activites None of the above   Safety concerns in the home None of the above         7/3/2025   Dental   Dentist two times every year? Yes         7/3/2025   Hearing Screening   Hearing concerns? None of the above         7/3/2025   Driving Risk Screening   Patient/family members have concerns about driving No         7/3/2025   General Alertness/Fatigue Screening   Have you been more tired than usual lately? No         7/3/2025   Urinary Incontinence Screening   Bothered by leaking urine in past 6 months Yes       Today's PHQ-9 Score:       7/3/2025     8:35 AM   PHQ-9 SCORE   PHQ-9 Total Score MyChart 0   PHQ-9 Total Score 0        Patient-reported         7/3/2025   Substance Use   Alcohol more than 3/day or more than 7/wk No   Do you have a current opioid prescription? No   How severe/bad is pain from 1 to 10? 0/10 (No Pain)   Do you use any other substances recreationally? No     Social History     Tobacco Use    Smoking status: Every Day     Current packs/day: 0.50     Average packs/day: 0.5 packs/day for 15.0 years (7.5 ttl pk-yrs)     Types: Cigarettes     Passive exposure: Current    Smokeless tobacco: Never   Vaping Use    Vaping status: Never Used   Substance Use Topics    Alcohol use: No    Drug use: No            6/5/2025   LAST FHS-7 RESULTS   1st degree relative breast or ovarian cancer Yes   Any relative bilateral breast cancer No   Any male have breast cancer No   Any ONE woman have BOTH breast AND ovarian cancer No   Any woman with breast cancer before 50yrs No   2 or more relatives with breast AND/OR ovarian cancer No   2 or more relatives with breast AND/OR bowel cancer No        Mammogram Screening - Mammogram every 1-2 years updated in Health Maintenance based on mutual decision making          7/3/2025   One time HIV Screening   Previous HIV test? No     History of abnormal Pap smear: No - age 65 or older with adequate negative prior screening test results (3 consecutive negative cytology results, 2 consecutive negative cotesting results, or 2 consecutive negative HrHPV test results within 10 years, with the most recent test occurring within the recommended screening interval for the test used)        6/6/2016     8:45 AM   PAP / HPV   PAP Negative for squamous intraepithelial lesion or malignancy  Electronically signed by Megha Garcia CT (ASCP) on 6/14/2016 at  1:30 PM        ASCVD Risk   The 10-year ASCVD risk score (Kenisha DIAZ, et al., 2019) is: 25.7%    Values used to calculate the score:      Age: 64 years      Sex: Female      Is Non- : No      Diabetic: Yes      Tobacco smoker: Yes      Systolic Blood Pressure: 128 mmHg      Is BP treated: Yes      HDL Cholesterol: 33 mg/dL      Total Cholesterol: 148 mg/dL          Reviewed and updated as needed this visit by Provider       Med Hx  Surg Hx  Fam Hx            Past Medical History:   Diagnosis Date    Anxiety     Depression     Hyperlipidemia     Hypertension     Ingrown toenail     Onychomycosis     Painful swelling of joint     Rheumatoid arthritis (H)     Sleep apnea     Using CPAP machine     Past Surgical History:   Procedure Laterality Date    ANKLE SURGERY Right     HERNIA REPAIR      LUMBAR FUSION   2006    [facility] at [facility]    SPINAL FUSION      [facility] APPENDECTOMY      Description: Appendectomy;  Recorded: 09/23/2008;     Current Outpatient Medications   Medication Sig Dispense Refill    atorvastatin (LIPITOR) 40 MG tablet Take 1 tablet (40 mg) by mouth daily at 2 pm. 90 tablet 3    FLUoxetine (PROZAC) 10 MG capsule Take 1 capsule (10 mg) by mouth daily. 90 capsule 3    fluticasone (FLONASE) 50 mcg/actuation nasal spray [FLUTICASONE (FLONASE) 50 MCG/ACTUATION NASAL SPRAY] SHAKE WELL AND USE 2 SPRAYS IN EACH NOSTRIL DAILY FOR 10 DAYS 48 g 3    hydrochlorothiazide (HYDRODIURIL) 25 MG tablet Take 1 tablet (25 mg) by mouth daily. 90 tablet 3    ibuprofen (ADVIL/MOTRIN) 600 MG tablet TAKE 1 TABLET BY MOUTH EVERY 6  HOURS AS NEEDED FOR PAIN 360 tablet 3    metFORMIN (GLUCOPHAGE) 500 MG tablet TAKE 1 TABLET BY MOUTH TWICE  DAILY WITH MEALS 180 tablet 1     Current providers sharing in care for this patient include:  Patient Care Team:  Leonora Dolan APRN CNP as PCP - General  Precious Haro, PharmD as Pharmacist (Pharmacist)  Leonora Dolan APRN CNP as Assigned PCP  Shane Bonilla MD (Internal Medicine)  James Mejia, RN as Specialty Care Coordinator (Hematology & Oncology)    The following health maintenance items are reviewed in Epic and correct as of today:  Health Maintenance   Topic Date Due    DEXA  Never done    DEPRESSION ACTION PLAN  Never done    HIV SCREENING  Never done    HEPATITIS C SCREENING  Never done    RSV VACCINE (1 - Risk 60-74 years 1-dose series) Never done    PNEUMOCOCCAL VACCINE 50+ YEARS (3 of 3 - PCV20 or PCV21) 05/27/2020    LUNG CANCER SCREENING  12/05/2022    COLORECTAL CANCER SCREENING  06/25/2024    A1C  02/13/2025    MEDICARE ANNUAL WELLNESS VISIT  04/04/2025    BMP  04/04/2025    LIPID  04/04/2025    COVID-19 VACCINE (6 - 2024-25 season) 05/05/2025    NICOTINE/TOBACCO CESSATION COUNSELING Q 1 YR  06/12/2025    INFLUENZA VACCINE (1) 09/01/2025    MICROALBUMIN  11/13/2025     "DIABETIC FOOT EXAM  11/13/2025    PHQ-9  01/03/2026    EYE EXAM  04/16/2026    DTAP/TDAP/TD VACCINE (3 - Td or Tdap) 06/06/2026    ANNUAL REVIEW OF HM ORDERS  07/03/2026    FALL RISK ASSESSMENT  07/03/2026    MAMMO SCREENING  06/05/2027    ADVANCE CARE PLANNING  04/04/2029    ZOSTER VACCINE  Completed    HPV VACCINE  Aged Out    MENINGITIS VACCINE  Aged Out    PAP  Discontinued         Review of Systems  Constitutional, HEENT, cardiovascular, pulmonary, GI, , musculoskeletal, neuro, skin, endocrine and psych systems are negative, except as otherwise noted.     Objective    Exam  /74   Pulse 67   Temp 97.2  F (36.2  C)   Resp 24   Ht 1.626 m (5' 4\")   Wt 106.1 kg (234 lb)   SpO2 96%   BMI 40.17 kg/m     Estimated body mass index is 40.17 kg/m  as calculated from the following:    Height as of this encounter: 1.626 m (5' 4\").    Weight as of this encounter: 106.1 kg (234 lb).    Physical Exam  GENERAL: alert and no distress  EYES: Eyes grossly normal to inspection, PERRL and conjunctivae and sclerae normal  HENT: ear canals and TM's normal, nose and mouth without ulcers or lesions  NECK: no adenopathy, no asymmetry, masses, or scars  RESP: lungs clear to auscultation - no rales, rhonchi or wheezes  CV: regular rate and rhythm, normal S1 S2, no S3 or S4, no murmur, click or rub, no peripheral edema  ABDOMEN: soft, nontender, no hepatosplenomegaly, no masses and bowel sounds normal  MS: no gross musculoskeletal defects noted, no edema  SKIN: no suspicious lesions or rashes  NEURO: Normal strength and tone, mentation intact and speech normal  PSYCH: mentation appears normal, affect normal/bright        7/3/2025   Mini Cog   Clock Draw Score 2 Normal   3 Item Recall 2 objects recalled   Mini Cog Total Score 4              Signed Electronically by: ERIC Fairchild CNP    Answers submitted by the patient for this visit:  Patient Health Questionnaire (Submitted on 7/3/2025)  If you checked off any " problems, how difficult have these problems made it for you to do your work, take care of things at home, or get along with other people?: Not difficult at all  PHQ9 TOTAL SCORE: 0  Patient Health Questionnaire (G7) (Submitted on 7/3/2025)  PRASHANTH 7 TOTAL SCORE: 0

## 2025-07-05 ENCOUNTER — RESULTS FOLLOW-UP (OUTPATIENT)
Dept: FAMILY MEDICINE | Facility: CLINIC | Age: 65
End: 2025-07-05

## 2025-08-12 ENCOUNTER — TELEPHONE (OUTPATIENT)
Dept: GASTROENTEROLOGY | Facility: CLINIC | Age: 65
End: 2025-08-12
Payer: COMMERCIAL